# Patient Record
Sex: FEMALE | Race: BLACK OR AFRICAN AMERICAN | Employment: OTHER | URBAN - METROPOLITAN AREA
[De-identification: names, ages, dates, MRNs, and addresses within clinical notes are randomized per-mention and may not be internally consistent; named-entity substitution may affect disease eponyms.]

---

## 2018-02-02 ENCOUNTER — OFFICE VISIT (OUTPATIENT)
Dept: INTERNAL MEDICINE CLINIC | Facility: CLINIC | Age: 73
End: 2018-02-02

## 2018-02-02 VITALS
BODY MASS INDEX: 47.09 KG/M2 | WEIGHT: 293 LBS | HEIGHT: 66 IN | TEMPERATURE: 98 F | SYSTOLIC BLOOD PRESSURE: 131 MMHG | HEART RATE: 73 BPM | DIASTOLIC BLOOD PRESSURE: 80 MMHG

## 2018-02-02 DIAGNOSIS — I10 ESSENTIAL HYPERTENSION: ICD-10-CM

## 2018-02-02 DIAGNOSIS — I35.1 AORTIC EJECTION MURMUR: ICD-10-CM

## 2018-02-02 DIAGNOSIS — R42 VERTIGO: ICD-10-CM

## 2018-02-02 DIAGNOSIS — E78.2 MIXED HYPERLIPIDEMIA: ICD-10-CM

## 2018-02-02 DIAGNOSIS — R42 LIGHTHEADEDNESS: Primary | ICD-10-CM

## 2018-02-02 DIAGNOSIS — M17.0 PRIMARY OSTEOARTHRITIS OF BOTH KNEES: ICD-10-CM

## 2018-02-02 PROCEDURE — 99204 OFFICE O/P NEW MOD 45 MIN: CPT | Performed by: INTERNAL MEDICINE

## 2018-02-02 PROCEDURE — G0463 HOSPITAL OUTPT CLINIC VISIT: HCPCS | Performed by: INTERNAL MEDICINE

## 2018-02-02 RX ORDER — ATORVASTATIN CALCIUM 10 MG/1
TABLET, FILM COATED ORAL
Refills: 2 | COMMUNITY
Start: 2017-11-27 | End: 2019-05-22 | Stop reason: ALTCHOICE

## 2018-02-02 RX ORDER — MECLIZINE HYDROCHLORIDE 25 MG/1
25 TABLET ORAL 3 TIMES DAILY PRN
Qty: 21 TABLET | Refills: 0 | Status: SHIPPED | OUTPATIENT
Start: 2018-02-02 | End: 2018-02-09

## 2018-02-02 RX ORDER — PANTOPRAZOLE SODIUM 40 MG/1
TABLET, DELAYED RELEASE ORAL
Refills: 2 | COMMUNITY
Start: 2017-12-11 | End: 2020-07-20

## 2018-02-02 RX ORDER — AMLODIPINE BESYLATE 5 MG/1
TABLET ORAL
Refills: 2 | COMMUNITY
Start: 2017-11-28 | End: 2019-06-03

## 2018-02-02 RX ORDER — NAPROXEN 500 MG/1
TABLET ORAL
Refills: 0 | COMMUNITY
Start: 2017-12-14 | End: 2019-05-22 | Stop reason: ALTCHOICE

## 2018-02-02 RX ORDER — TRIAMTERENE AND HYDROCHLOROTHIAZIDE 37.5; 25 MG/1; MG/1
CAPSULE ORAL
Refills: 2 | COMMUNITY
Start: 2018-01-15 | End: 2019-06-03

## 2018-02-02 NOTE — PROGRESS NOTES
Kerri Garcia is a 71year old female. Patient presents with:  Lightheadedness: pt c/o of some dizziness      HPI:   71 yr old female here as a new patient for lightheadedness for 4 days. She feels dizzy when she wakes up in th morning.  \" she feels unusual skin lesions or rashes. RESPIRATORY: denies shortness of breath, wheezing, cough. CARDIOVASCULAR: denies chest pain on exertion, palpitations, swelling in feet. GI: denies abdominal pain and denies heartburn, nausea or vomiting.   : No pain on (CPT=93306); Future  -     US CAROTID DOPPLER BILAT - DIAG IMG (CPT=93880); Future    Vertigo  Plan as above. Aortic ejection murmur  Grade 4 systolic ejection murmur at left ICS. Suspecting aortic valvular pathology. Getting  echo.   -     CARD ECHO 2

## 2018-02-03 ENCOUNTER — HOSPITAL ENCOUNTER (OUTPATIENT)
Dept: CV DIAGNOSTICS | Facility: HOSPITAL | Age: 73
Discharge: HOME | End: 2018-02-03
Attending: INTERNAL MEDICINE
Payer: MEDICARE

## 2018-02-03 DIAGNOSIS — I35.1 AORTIC EJECTION MURMUR: ICD-10-CM

## 2018-02-03 DIAGNOSIS — R42 LIGHTHEADEDNESS: ICD-10-CM

## 2018-02-03 PROCEDURE — 93306 TTE W/DOPPLER COMPLETE: CPT | Performed by: INTERNAL MEDICINE

## 2018-02-09 ENCOUNTER — TELEPHONE (OUTPATIENT)
Dept: OPHTHALMOLOGY | Facility: CLINIC | Age: 73
End: 2018-02-09

## 2018-02-09 NOTE — TELEPHONE ENCOUNTER
Patient states she has been experiencing weakness in her left eye for about a weak. Requesting to be seen ASAP. Please call. Thank you.  NEW PATIENT, aware clinic is closed for the day

## 2018-02-12 NOTE — TELEPHONE ENCOUNTER
Patient says that her left eye has been \"bothering\" her in the morning for about 2 weeks. She says that OS is watering, irritated and blurry in the mornings. She says she wears glasses and probably needs an update.   Appointment was offered with PRICE hu

## 2018-02-15 ENCOUNTER — OFFICE VISIT (OUTPATIENT)
Dept: OPHTHALMOLOGY | Facility: CLINIC | Age: 73
End: 2018-02-15

## 2018-02-15 DIAGNOSIS — H02.883 MEIBOMIAN GLAND DYSFUNCTION (MGD) OF BOTH EYES: Primary | ICD-10-CM

## 2018-02-15 DIAGNOSIS — H02.886 MEIBOMIAN GLAND DYSFUNCTION (MGD) OF BOTH EYES: Primary | ICD-10-CM

## 2018-02-15 PROCEDURE — G0463 HOSPITAL OUTPT CLINIC VISIT: HCPCS | Performed by: OPHTHALMOLOGY

## 2018-02-15 PROCEDURE — 99203 OFFICE O/P NEW LOW 30 MIN: CPT | Performed by: OPHTHALMOLOGY

## 2018-02-15 NOTE — PATIENT INSTRUCTIONS
Meibomian gland dysfunction (MGD) of both eyes  Patient was instructed to use warm compresses to the eyelids twice a day everyday.     Instructions for warm compress use:   Patient should place wash compresses on both eyelids for 5 minutes every morning and

## 2018-02-15 NOTE — PROGRESS NOTES
Lillian Jeffers is a 71year old female. HPI:     HPI     NP. Pt complains of blurred vision, tearing, itching and irritation with crusting OS x 2 weeks when she wakes up in the morning. Pt states that her last eye exam was over 3 years ago.  Pt is n Slit Lamp Exam       Right Left    Lids/Lashes Dermatochalasis, Meibomian gland dysfunction Dermatochalasis, Meibomian gland dysfunction, frank upper lid- no FB    Conjunctiva/Sclera Trace Injection Trace Injection    Cornea Clear, no fluorescein stain

## 2019-05-22 ENCOUNTER — OFFICE VISIT (OUTPATIENT)
Dept: INTERNAL MEDICINE CLINIC | Facility: CLINIC | Age: 74
End: 2019-05-22
Payer: MEDICARE

## 2019-05-22 VITALS — HEIGHT: 66 IN | BODY MASS INDEX: 43.36 KG/M2 | WEIGHT: 269.81 LBS

## 2019-05-22 DIAGNOSIS — F32.0 CURRENT MILD EPISODE OF MAJOR DEPRESSIVE DISORDER, UNSPECIFIED WHETHER RECURRENT (HCC): ICD-10-CM

## 2019-05-22 DIAGNOSIS — M17.0 PRIMARY OSTEOARTHRITIS OF BOTH KNEES: ICD-10-CM

## 2019-05-22 DIAGNOSIS — E78.2 MIXED HYPERLIPIDEMIA: Primary | ICD-10-CM

## 2019-05-22 DIAGNOSIS — I10 ESSENTIAL HYPERTENSION: ICD-10-CM

## 2019-05-22 DIAGNOSIS — R42 DIZZINESS: ICD-10-CM

## 2019-05-22 DIAGNOSIS — G56.03 BILATERAL CARPAL TUNNEL SYNDROME: ICD-10-CM

## 2019-05-22 PROBLEM — F32.9 MAJOR DEPRESSIVE DISORDER: Status: ACTIVE | Noted: 2019-05-22

## 2019-05-22 PROCEDURE — 99214 OFFICE O/P EST MOD 30 MIN: CPT | Performed by: INTERNAL MEDICINE

## 2019-05-22 PROCEDURE — G0463 HOSPITAL OUTPT CLINIC VISIT: HCPCS | Performed by: INTERNAL MEDICINE

## 2019-05-22 RX ORDER — ALLOPURINOL 100 MG/1
100 TABLET ORAL
COMMUNITY
End: 2019-06-03

## 2019-05-22 RX ORDER — SERTRALINE HYDROCHLORIDE 25 MG/1
TABLET, FILM COATED ORAL
Refills: 3 | COMMUNITY
Start: 2019-04-24 | End: 2019-06-10 | Stop reason: ALTCHOICE

## 2019-05-22 RX ORDER — ATORVASTATIN CALCIUM 20 MG/1
TABLET, FILM COATED ORAL
Refills: 3 | COMMUNITY
Start: 2019-03-11 | End: 2019-06-03

## 2019-05-22 NOTE — ASSESSMENT & PLAN NOTE
CPM  On amlodopine,, triamterene-hydrochlorothiazide 37.5/25 mg. Check labs. Her kidney function was slightly low in the labs done in [facility], could be from dehydration. Her kidney function was normal in December 2018  Repeat labs.

## 2019-05-22 NOTE — ASSESSMENT & PLAN NOTE
Patient with history of depression and anxiety. She has sertraline 25 mg, continue. Has Ativan 0.5 mg prescribed by her previous PCP for acute anxiety which she has at home. Can take it as needed for acute episodes.   She does not like to take it due to

## 2019-05-22 NOTE — PROGRESS NOTES
Gonzalez Krishnan is a 69year old female. Patient presents with:  ER F/U: patient went to [facility] ER on 4/21/19  Tingling      HPI:     HPI    Patient is here for ER follow on 4/21/19. She states she felt anxious and nervous on the day of ER visit.   [employer]   Constitutional: Negative for chills, fever, malaise/fatigue and weight loss. HENT: Negative for congestion, sinus pain, sore throat and tinnitus. Eyes: Negative for blurred vision and double vision.    Respiratory: Negative for cough, sputum labs.  Her kidney function was slightly low in the labs done in [facility], could be from dehydration. Her kidney function was normal in December 2018  Repeat labs.          Relevant Orders    LIPID PANEL    COMP METABOLIC PANEL (14)    CBC WITH DIFFERENTIA agrees to the plan.               Mo Jason MD  5/22/2019  3:01 PM

## 2019-05-22 NOTE — ASSESSMENT & PLAN NOTE
Symptoms are mild. No hyperthenar atrophy or loss of   strength. Advised to use wrist splint bilaterally nightly.

## 2019-06-03 ENCOUNTER — OFFICE VISIT (OUTPATIENT)
Dept: INTERNAL MEDICINE CLINIC | Facility: CLINIC | Age: 74
End: 2019-06-03
Payer: MEDICARE

## 2019-06-03 ENCOUNTER — LAB ENCOUNTER (OUTPATIENT)
Dept: LAB | Age: 74
End: 2019-06-03
Attending: INTERNAL MEDICINE
Payer: MEDICARE

## 2019-06-03 VITALS
HEIGHT: 66 IN | BODY MASS INDEX: 42.4 KG/M2 | DIASTOLIC BLOOD PRESSURE: 72 MMHG | SYSTOLIC BLOOD PRESSURE: 110 MMHG | WEIGHT: 263.81 LBS | HEART RATE: 80 BPM

## 2019-06-03 DIAGNOSIS — M10.9 GOUT, UNSPECIFIED CAUSE, UNSPECIFIED CHRONICITY, UNSPECIFIED SITE: ICD-10-CM

## 2019-06-03 DIAGNOSIS — M47.819 ARTHRITIS OF LOW BACK: ICD-10-CM

## 2019-06-03 DIAGNOSIS — F32.0 CURRENT MILD EPISODE OF MAJOR DEPRESSIVE DISORDER, UNSPECIFIED WHETHER RECURRENT (HCC): ICD-10-CM

## 2019-06-03 DIAGNOSIS — E78.2 MIXED HYPERLIPIDEMIA: ICD-10-CM

## 2019-06-03 DIAGNOSIS — F41.9 ANXIETY: ICD-10-CM

## 2019-06-03 DIAGNOSIS — I10 ESSENTIAL HYPERTENSION: ICD-10-CM

## 2019-06-03 DIAGNOSIS — I10 ESSENTIAL HYPERTENSION: Primary | ICD-10-CM

## 2019-06-03 DIAGNOSIS — E79.0 ELEVATED BLOOD URIC ACID LEVEL: ICD-10-CM

## 2019-06-03 PROCEDURE — 99214 OFFICE O/P EST MOD 30 MIN: CPT | Performed by: INTERNAL MEDICINE

## 2019-06-03 PROCEDURE — 85025 COMPLETE CBC W/AUTO DIFF WBC: CPT

## 2019-06-03 PROCEDURE — 36415 COLL VENOUS BLD VENIPUNCTURE: CPT

## 2019-06-03 PROCEDURE — 80053 COMPREHEN METABOLIC PANEL: CPT

## 2019-06-03 PROCEDURE — G0463 HOSPITAL OUTPT CLINIC VISIT: HCPCS | Performed by: INTERNAL MEDICINE

## 2019-06-03 PROCEDURE — 84443 ASSAY THYROID STIM HORMONE: CPT

## 2019-06-03 PROCEDURE — 80061 LIPID PANEL: CPT

## 2019-06-03 RX ORDER — ALLOPURINOL 100 MG/1
100 TABLET ORAL DAILY
Qty: 90 TABLET | Refills: 0 | Status: SHIPPED | OUTPATIENT
Start: 2019-06-03 | End: 2019-06-10 | Stop reason: ALTCHOICE

## 2019-06-03 RX ORDER — TRIAMTERENE AND HYDROCHLOROTHIAZIDE 37.5; 25 MG/1; MG/1
CAPSULE ORAL
Qty: 90 CAPSULE | Refills: 3 | Status: SHIPPED | OUTPATIENT
Start: 2019-06-03 | End: 2020-01-18

## 2019-06-03 RX ORDER — ATORVASTATIN CALCIUM 20 MG/1
TABLET, FILM COATED ORAL
Qty: 90 TABLET | Refills: 1 | Status: SHIPPED | OUTPATIENT
Start: 2019-06-03 | End: 2019-06-10

## 2019-06-03 NOTE — PROGRESS NOTES
Cecilia Cabrales is a 69year old female.   Patient presents with:  Establish Care      HPI:   Pt comes as a new pt   C/c anxiety   C/o thinks that the sertraline is making her feel fittery in the am --was started in April but didn't start it intil 2 week , + anxiety, + depression-- no SI HI     EXAM:   /72   Pulse 80   Ht 5' 6\" (1.676 m)   Wt 263 lb 12.8 oz (119.7 kg)   BMI 42.58 kg/m²   GENERAL: well developed, well nourished,in no apparent distress, walks with cane   SKIN: no rashes,no suspicious

## 2019-06-10 ENCOUNTER — OFFICE VISIT (OUTPATIENT)
Dept: INTERNAL MEDICINE CLINIC | Facility: CLINIC | Age: 74
End: 2019-06-10
Payer: MEDICARE

## 2019-06-10 VITALS
WEIGHT: 259 LBS | BODY MASS INDEX: 41.62 KG/M2 | TEMPERATURE: 99 F | HEART RATE: 70 BPM | HEIGHT: 66 IN | SYSTOLIC BLOOD PRESSURE: 136 MMHG | DIASTOLIC BLOOD PRESSURE: 84 MMHG

## 2019-06-10 DIAGNOSIS — F41.9 ANXIETY: ICD-10-CM

## 2019-06-10 DIAGNOSIS — M17.0 PRIMARY OSTEOARTHRITIS OF BOTH KNEES: ICD-10-CM

## 2019-06-10 DIAGNOSIS — I10 ESSENTIAL HYPERTENSION: ICD-10-CM

## 2019-06-10 DIAGNOSIS — G89.29 CHRONIC MIDLINE LOW BACK PAIN WITHOUT SCIATICA: ICD-10-CM

## 2019-06-10 DIAGNOSIS — R73.01 ELEVATED FASTING BLOOD SUGAR: ICD-10-CM

## 2019-06-10 DIAGNOSIS — N18.30 CKD (CHRONIC KIDNEY DISEASE) STAGE 3, GFR 30-59 ML/MIN (HCC): ICD-10-CM

## 2019-06-10 DIAGNOSIS — M10.9 GOUT, UNSPECIFIED CAUSE, UNSPECIFIED CHRONICITY, UNSPECIFIED SITE: ICD-10-CM

## 2019-06-10 DIAGNOSIS — M54.50 CHRONIC MIDLINE LOW BACK PAIN WITHOUT SCIATICA: ICD-10-CM

## 2019-06-10 DIAGNOSIS — E78.2 MIXED HYPERLIPIDEMIA: Primary | ICD-10-CM

## 2019-06-10 PROCEDURE — G0463 HOSPITAL OUTPT CLINIC VISIT: HCPCS | Performed by: INTERNAL MEDICINE

## 2019-06-10 PROCEDURE — 99214 OFFICE O/P EST MOD 30 MIN: CPT | Performed by: INTERNAL MEDICINE

## 2019-06-10 RX ORDER — ATORVASTATIN CALCIUM 40 MG/1
TABLET, FILM COATED ORAL
Qty: 90 TABLET | Refills: 2 | Status: SHIPPED | OUTPATIENT
Start: 2019-06-10 | End: 2020-02-18

## 2019-06-10 NOTE — PATIENT INSTRUCTIONS
Arthritis: Exercise     Look for exercise classes for arthritis in your community. Exercise is important to your overall health. It is especially important in people with arthritis.  Regular exercise can:  · Keep your heart and blood vessels healthy · Pay attention to your body. Don't exercise a painful or swollen joint; switch to another activity. Follow the 2-hour pain rule: You did too much if your joint or muscle pain lasts 2 hours or more after exercising, or is worse the next day.  This doesn't m 1. Neck turns. Sit in a straight-backed chair. Look straight ahead. Slowly turn your head to the right, then return it to center. Repeat. Do the same thing, turning your head to the left. Repeat. 2. Shoulder raise. Lie on your back or sit in a chair.  Susan © 0321-2395 [employer] [address]. [address] [facility], [address] [place]. All rights reserved. This information is not intended as a substitute for professional medical care. Always follow your healthcare professional's instructions.         Back Ca · You may use acetaminophen or ibuprofen to control pain, unless your healthcare provider prescribed other pain medicine.  If you have chronic conditions like diabetes, liver or kidney disease, stomach ulcers, or gastrointestinal bleeding, or are taking blo When standing for long periods, shift most of your weight to one leg at a time. Alternate legs every few minutes.   Sleeping  The best way to sleep is on your side with your knees bent.  Put a low pillow under your head to support your neck in a neutral spi

## 2019-06-10 NOTE — PROGRESS NOTES
Sushila Buckley is a 69year old female. Patient presents with:   Follow - Up: 1 week f/u  Anxiety  Depression      HPI:   She comes for follow-up  C/c anxiety and depression   C/o anxiety and depression -- weaned herself off the sertraline and her last surgical history        Social History:  Social History    Tobacco Use      Smoking status: Never Smoker      Smokeless tobacco: Never Used    Alcohol use: No    Drug use: Never       REVIEW OF SYSTEMS:   GENERAL HEALTH: No fevers, chills, sweats, fatigue take that and can check  uric acid level at her next blood work    Essential hypertension  Advised pt to follow a low salt , low sodium (including fast foods and processed foods), can look up DASH diet, exercise 30 min a day , monitor bp     CKD (chronic k

## 2019-06-11 ENCOUNTER — PATIENT MESSAGE (OUTPATIENT)
Dept: INTERNAL MEDICINE CLINIC | Facility: CLINIC | Age: 74
End: 2019-06-11

## 2019-06-11 NOTE — TELEPHONE ENCOUNTER
From: Briseida Yip  To: Judy Piper MD  Sent: 6/11/2019 11:15 AM CDT  Subject: Other    Please mery zuniga need to asked a question about my blood pressure pill call me at [phone] thank you.

## 2019-06-11 NOTE — TELEPHONE ENCOUNTER
Patient forgot to take her medication for her b/p yesterday and took it later in the afternoon. She took it today a bit later than usual today as well. She wanted to know when to take it next.  Advised patient to continue her b/p medication per her regular

## 2019-06-13 ENCOUNTER — PATIENT MESSAGE (OUTPATIENT)
Dept: INTERNAL MEDICINE CLINIC | Facility: CLINIC | Age: 74
End: 2019-06-13

## 2019-06-13 NOTE — TELEPHONE ENCOUNTER
From: Rekha Arreaga  To: Guillermo Church MD  Sent: 6/13/2019 1:08 PM CDT  Subject: Other    Dr Murphy Weeks this mery zuniga I'm taking sertraline as you told me,but I'm having bad headache and dizziness, could this causes my blood pressure to be hig

## 2019-06-13 NOTE — TELEPHONE ENCOUNTER
Pt states weaning off of Zoloft. Pt took one pill today and after an hour had a headache and felt dizzy. I told patient this was not likely due to the Zoloft but could be due to high blood pressure.  Pt states she took her bp meds today, but did not take he

## 2019-06-14 ENCOUNTER — APPOINTMENT (OUTPATIENT)
Dept: LAB | Facility: HOSPITAL | Age: 74
End: 2019-06-14
Attending: INTERNAL MEDICINE
Payer: MEDICARE

## 2019-06-14 ENCOUNTER — HOSPITAL ENCOUNTER (OUTPATIENT)
Dept: GENERAL RADIOLOGY | Facility: HOSPITAL | Age: 74
Discharge: HOME | End: 2019-06-14
Attending: INTERNAL MEDICINE
Payer: MEDICARE

## 2019-06-14 DIAGNOSIS — G89.29 CHRONIC MIDLINE LOW BACK PAIN WITHOUT SCIATICA: ICD-10-CM

## 2019-06-14 DIAGNOSIS — M54.50 CHRONIC MIDLINE LOW BACK PAIN WITHOUT SCIATICA: ICD-10-CM

## 2019-06-14 DIAGNOSIS — R73.01 ELEVATED FASTING BLOOD SUGAR: ICD-10-CM

## 2019-06-14 DIAGNOSIS — M10.9 GOUT, UNSPECIFIED CAUSE, UNSPECIFIED CHRONICITY, UNSPECIFIED SITE: ICD-10-CM

## 2019-06-14 PROCEDURE — 36415 COLL VENOUS BLD VENIPUNCTURE: CPT

## 2019-06-14 PROCEDURE — 83036 HEMOGLOBIN GLYCOSYLATED A1C: CPT

## 2019-06-14 PROCEDURE — 72110 X-RAY EXAM L-2 SPINE 4/>VWS: CPT | Performed by: INTERNAL MEDICINE

## 2019-06-14 PROCEDURE — 84550 ASSAY OF BLOOD/URIC ACID: CPT

## 2019-06-17 ENCOUNTER — TELEPHONE (OUTPATIENT)
Dept: INTERNAL MEDICINE CLINIC | Facility: CLINIC | Age: 74
End: 2019-06-17

## 2019-06-17 DIAGNOSIS — M47.816 SPONDYLOSIS OF LUMBAR REGION WITHOUT MYELOPATHY OR RADICULOPATHY: Primary | ICD-10-CM

## 2019-06-17 NOTE — TELEPHONE ENCOUNTER
Results have been sent to my chart  A1c is good at 6.4 and uric acid is within normal limits  X-ray--arthritis in lumbar spine (lower back ).   You would benefit from from physical therapy and I have ordered this in the chart–please help her  Call to make t

## 2019-06-18 ENCOUNTER — PATIENT MESSAGE (OUTPATIENT)
Dept: INTERNAL MEDICINE CLINIC | Facility: CLINIC | Age: 74
End: 2019-06-18

## 2019-06-19 NOTE — PROGRESS NOTES
Agree with advice–would advise her to continue with the every other day for at least a month and then try every 2 days for a month and then every 3 days for a month--- she needs a slower taper

## 2019-06-19 NOTE — TELEPHONE ENCOUNTER
From: Ritesh Calhoun  To: Johanne Sandifer, MD  Sent: 6/18/2019 6:46 PM CDT  Subject: Other    This mery zuniga. Will you please give me a call at your earliest time. Thank You.

## 2019-06-19 NOTE — TELEPHONE ENCOUNTER
Called patient. She stated that Dr. Camacho Osei instructed her to take Sertraline HCl 25 MG every other day and after a week to take one tab every 2 days. Patient tried to do that but stated she felt nauseated and nervous.  This Rn advised her to take one tab e

## 2019-06-25 ENCOUNTER — OFFICE VISIT (OUTPATIENT)
Dept: NEPHROLOGY | Facility: CLINIC | Age: 74
End: 2019-06-25
Payer: MEDICARE

## 2019-06-25 VITALS
WEIGHT: 262.63 LBS | DIASTOLIC BLOOD PRESSURE: 80 MMHG | SYSTOLIC BLOOD PRESSURE: 130 MMHG | HEIGHT: 66 IN | HEART RATE: 81 BPM | TEMPERATURE: 99 F | BODY MASS INDEX: 42.21 KG/M2

## 2019-06-25 DIAGNOSIS — N17.9 AKI (ACUTE KIDNEY INJURY) (HCC): Primary | ICD-10-CM

## 2019-06-25 DIAGNOSIS — N18.30 CKD (CHRONIC KIDNEY DISEASE), STAGE III (HCC): ICD-10-CM

## 2019-06-25 PROCEDURE — G0463 HOSPITAL OUTPT CLINIC VISIT: HCPCS | Performed by: INTERNAL MEDICINE

## 2019-06-25 PROCEDURE — 99204 OFFICE O/P NEW MOD 45 MIN: CPT | Performed by: INTERNAL MEDICINE

## 2019-06-25 RX ORDER — SERTRALINE HYDROCHLORIDE 25 MG/1
12.5 TABLET, FILM COATED ORAL EVERY OTHER DAY
COMMUNITY
End: 2020-07-20

## 2019-06-25 NOTE — PATIENT INSTRUCTIONS
Lab test as ordered next week   follow up in 2-3 weeks   Ultrasound of kidney - call to make an appointment

## 2019-06-25 NOTE — PROGRESS NOTES
Consult Requested By: Dr. Lamin Valenzuela    Reason for Consult: CARLOS on CKD     HPI:     Patient is a 69 yrs old female with pmh of HTN x <5 yrs, CKD stage III, HL, osteoarthritis, obesity who presented for work up and evaluation of kidney disease     Lab Negative for chest pain, sobs  Respiratory:  Negative for cough, dyspnea and wheezing  Gastrointestinal:  Negative for abdominal pain, constipation  Genitourinary:  Negative for dysuria and hematuria  Endocrine:  Negative for abnormal sleep patterns, incre Microalb/Creat Ratio, Random Urine      Protein,Total,Urine, Random [E]      Meds This Visit:  Requested Prescriptions      No prescriptions requested or ordered in this encounter       Imaging & Referrals:  US KIDNEY/BLADDER (YFT=[id number])     6/25/2019  Ros

## 2019-07-05 ENCOUNTER — APPOINTMENT (OUTPATIENT)
Dept: LAB | Age: 74
End: 2019-07-05
Attending: INTERNAL MEDICINE
Payer: MEDICARE

## 2019-07-05 DIAGNOSIS — N17.9 AKI (ACUTE KIDNEY INJURY) (HCC): ICD-10-CM

## 2019-07-05 DIAGNOSIS — N18.30 CKD (CHRONIC KIDNEY DISEASE), STAGE III (HCC): ICD-10-CM

## 2019-07-05 LAB
ANION GAP SERPL CALC-SCNC: 5 MMOL/L (ref 0–18)
BILIRUB UR QL: NEGATIVE
BUN BLD-MCNC: 18 MG/DL (ref 7–18)
BUN/CREAT SERPL: 14.4 (ref 10–20)
CALCIUM BLD-MCNC: 9.1 MG/DL (ref 8.5–10.1)
CHLORIDE SERPL-SCNC: 106 MMOL/L (ref 98–112)
CLARITY UR: CLEAR
CO2 SERPL-SCNC: 30 MMOL/L (ref 21–32)
COLOR UR: YELLOW
CREAT BLD-MCNC: 1.25 MG/DL (ref 0.55–1.02)
CREAT UR-SCNC: 235 MG/DL
GLUCOSE BLD-MCNC: 107 MG/DL (ref 70–99)
GLUCOSE UR-MCNC: NEGATIVE MG/DL
HGB UR QL STRIP.AUTO: NEGATIVE
KETONES UR-MCNC: NEGATIVE MG/DL
MICROALBUMIN UR-MCNC: 0.55 MG/DL
MICROALBUMIN/CREAT 24H UR-RTO: 2.3 UG/MG (ref ?–30)
NITRITE UR QL STRIP.AUTO: NEGATIVE
OSMOLALITY SERPL CALC.SUM OF ELEC: 294 MOSM/KG (ref 275–295)
PATIENT FASTING: YES
PH UR: 6 [PH] (ref 5–8)
POTASSIUM SERPL-SCNC: 4.2 MMOL/L (ref 3.5–5.1)
PROT UR-MCNC: 17.4 MG/DL
PROT UR-MCNC: NEGATIVE MG/DL
RBC #/AREA URNS AUTO: <1 /HPF
SODIUM SERPL-SCNC: 141 MMOL/L (ref 136–145)
SP GR UR STRIP: 1.02 (ref 1–1.03)
UROBILINOGEN UR STRIP-ACNC: <2
VIT C UR-MCNC: 40 MG/DL
WBC #/AREA URNS AUTO: 2 /HPF

## 2019-07-05 PROCEDURE — 36415 COLL VENOUS BLD VENIPUNCTURE: CPT

## 2019-07-05 PROCEDURE — 84156 ASSAY OF PROTEIN URINE: CPT

## 2019-07-05 PROCEDURE — 82043 UR ALBUMIN QUANTITATIVE: CPT

## 2019-07-05 PROCEDURE — 82570 ASSAY OF URINE CREATININE: CPT

## 2019-07-05 PROCEDURE — 80048 BASIC METABOLIC PNL TOTAL CA: CPT

## 2019-07-05 PROCEDURE — 81001 URINALYSIS AUTO W/SCOPE: CPT

## 2019-07-07 ENCOUNTER — PATIENT MESSAGE (OUTPATIENT)
Dept: NEPHROLOGY | Facility: CLINIC | Age: 74
End: 2019-07-07

## 2019-07-08 ENCOUNTER — HOSPITAL ENCOUNTER (OUTPATIENT)
Dept: ULTRASOUND IMAGING | Facility: HOSPITAL | Age: 74
Discharge: HOME | End: 2019-07-08
Attending: INTERNAL MEDICINE
Payer: MEDICARE

## 2019-07-08 DIAGNOSIS — N17.9 AKI (ACUTE KIDNEY INJURY) (HCC): ICD-10-CM

## 2019-07-08 DIAGNOSIS — N18.30 CKD (CHRONIC KIDNEY DISEASE), STAGE III (HCC): ICD-10-CM

## 2019-07-08 PROCEDURE — 76770 US EXAM ABDO BACK WALL COMP: CPT | Performed by: INTERNAL MEDICINE

## 2019-07-08 NOTE — TELEPHONE ENCOUNTER
Urine test (urine protein and Creatinine) done to quantify urine protein.  Urine is negative for proteinuria    Improve kidney function

## 2019-07-08 NOTE — TELEPHONE ENCOUNTER
Contacted pt. Requesting lab results and interpretation of results, specifically for random urine creatinine level.

## 2019-07-08 NOTE — TELEPHONE ENCOUNTER
From: March Jacob  To: Travon Maya MD  Sent: 7/7/2019 1:21 PM CDT  Subject: Test Results Question    This is mery Matamoros I have a question about my creatinine ur random test please call [phone]. Thank You.

## 2019-07-10 ENCOUNTER — PATIENT MESSAGE (OUTPATIENT)
Dept: NEPHROLOGY | Facility: CLINIC | Age: 74
End: 2019-07-10

## 2019-07-11 NOTE — TELEPHONE ENCOUNTER
Contacted pt and answered her questions about the simple cyst found on her left kidney. Pt reassured that this is a simple cyst that doesn't require follow up at this time time per [facility]'s notation on US report.

## 2019-07-11 NOTE — TELEPHONE ENCOUNTER
From: Lainey Marquez  To: Ej Nicholson MD  Sent: 7/10/2019 9:02 PM CDT  Subject: Test Results Question    This is Lainey Marquez . I have a question about my kidney test results. please call me back thank you.

## 2019-07-13 ENCOUNTER — PATIENT MESSAGE (OUTPATIENT)
Dept: INTERNAL MEDICINE CLINIC | Facility: CLINIC | Age: 74
End: 2019-07-13

## 2019-07-13 ENCOUNTER — NURSE TRIAGE (OUTPATIENT)
Dept: OTHER | Age: 74
End: 2019-07-13

## 2019-07-13 ENCOUNTER — HOSPITAL ENCOUNTER (EMERGENCY)
Facility: HOSPITAL | Age: 74
Discharge: HOME | End: 2019-07-13
Attending: EMERGENCY MEDICINE
Payer: MEDICARE

## 2019-07-13 VITALS
TEMPERATURE: 100 F | WEIGHT: 263 LBS | DIASTOLIC BLOOD PRESSURE: 60 MMHG | SYSTOLIC BLOOD PRESSURE: 134 MMHG | OXYGEN SATURATION: 97 % | RESPIRATION RATE: 18 BRPM | BODY MASS INDEX: 42 KG/M2 | HEART RATE: 71 BPM

## 2019-07-13 DIAGNOSIS — M43.6 TORTICOLLIS: Primary | ICD-10-CM

## 2019-07-13 PROCEDURE — 99283 EMERGENCY DEPT VISIT LOW MDM: CPT

## 2019-07-13 RX ORDER — DIAZEPAM 2 MG/1
2 TABLET ORAL ONCE
Status: COMPLETED | OUTPATIENT
Start: 2019-07-13 | End: 2019-07-13

## 2019-07-13 RX ORDER — IBUPROFEN 600 MG/1
600 TABLET ORAL ONCE
Status: COMPLETED | OUTPATIENT
Start: 2019-07-13 | End: 2019-07-13

## 2019-07-13 RX ORDER — DIAZEPAM 2 MG/1
2 TABLET ORAL NIGHTLY PRN
Qty: 10 TABLET | Refills: 0 | Status: SHIPPED | OUTPATIENT
Start: 2019-07-13 | End: 2019-07-20

## 2019-07-13 NOTE — TELEPHONE ENCOUNTER
Please reply to pool: EM RN TRIAGE  Action Requested: Summary for Provider     []  Critical Lab, Recommendations Needed  [x] Need Additional Advice  []   FYI    [x]   Need Orders  [] Need Medications Sent to Pharmacy  []  Other     SUMMARY: Home care adv

## 2019-07-13 NOTE — TELEPHONE ENCOUNTER
See Acute TE 7/13/19, will close this encounter. From: Jer Noland  To: Marc Davies MD  Sent: 7/13/2019  8:19 AM CDT  Subject: Prescription Question    Can you give a medical for muscle sapase.  I have had pain in the left two days  This Ch

## 2019-07-13 NOTE — TELEPHONE ENCOUNTER
Spoke with the patient and made her aware of Dr. Katia Hemphill message.  Patient voiced understanding and scheduled an appointment for Monday 7/15/19 to see Dr. Carol Oneil patient to go to the urgent care clinic or walk-in clinic if her symptoms get worse

## 2019-07-13 NOTE — TELEPHONE ENCOUNTER
----- Message from Gonzalez Krishnan sent at 7/13/2019  8:19 AM CDT -----  Regarding: Prescription Question  Contact: [phone]  Can you give a medical for muscle sapase. I have had pain in the left two days  This Gonzalez Krishnan.  [phone]  Than

## 2019-07-13 NOTE — TELEPHONE ENCOUNTER
With advice given especially due to patient's age and side effects of muscle relaxants--in chart noted that she is never been on this

## 2019-07-14 NOTE — ED PROVIDER NOTES
Patient Seen in: [facility] AND [facility] Emergency Department    History   Patient presents with:  Neck Pain (musculoskeletal, neurologic)    Stated Complaint: neck pain for two days, denies any traumatic injury    HPI    Patient is a 64-year-old female who pr motor strength in all extremities, no focal deficits, equal hand grasp b/l  SKIN: warm, dry, no rashes        ED Course   Labs Reviewed - No data to display      MDM   Pt given ibuprofen and valium. Rx for home.  Advised close f/u and to return for other sy

## 2019-07-22 ENCOUNTER — APPOINTMENT (OUTPATIENT)
Dept: PHYSICAL THERAPY | Facility: HOSPITAL | Age: 74
End: 2019-07-22
Attending: INTERNAL MEDICINE
Payer: MEDICARE

## 2019-07-22 ENCOUNTER — OFFICE VISIT (OUTPATIENT)
Dept: NEPHROLOGY | Facility: CLINIC | Age: 74
End: 2019-07-22
Payer: MEDICARE

## 2019-07-22 VITALS
SYSTOLIC BLOOD PRESSURE: 123 MMHG | HEIGHT: 65 IN | WEIGHT: 261.81 LBS | TEMPERATURE: 99 F | BODY MASS INDEX: 43.62 KG/M2 | DIASTOLIC BLOOD PRESSURE: 78 MMHG | HEART RATE: 74 BPM

## 2019-07-22 DIAGNOSIS — N18.30 CKD (CHRONIC KIDNEY DISEASE), STAGE III (HCC): Primary | ICD-10-CM

## 2019-07-22 DIAGNOSIS — N17.9 AKI (ACUTE KIDNEY INJURY) (HCC): ICD-10-CM

## 2019-07-22 PROCEDURE — 99213 OFFICE O/P EST LOW 20 MIN: CPT | Performed by: INTERNAL MEDICINE

## 2019-07-22 PROCEDURE — G0463 HOSPITAL OUTPT CLINIC VISIT: HCPCS | Performed by: INTERNAL MEDICINE

## 2019-07-22 NOTE — PROGRESS NOTES
Progress Note:      Patient is a 69 yrs old female with pmh of HTN x <5 yrs, CKD stage III, HL, osteoarthritis, obesity who presented for follow up     Lab test BUN/Cr 18/1.42 mg/dl with an eGFR 42 ml/min. Serum albumin and calcium wnl.  No prior lab avai Negative for cough, dyspnea and wheezing  Gastrointestinal:  Negative for abdominal pain, constipation  Genitourinary:  Negative for dysuria and hematuria  Endocrine:  Negative for abnormal sleep patterns, increased activity  Hema/Lymph:  Negative for easy Visit:  Requested Prescriptions      No prescriptions requested or ordered in this encounter       Imaging & Referrals:  None     7/22/2019  Michael Saini MD

## 2019-07-30 ENCOUNTER — OFFICE VISIT (OUTPATIENT)
Dept: PHYSICAL THERAPY | Facility: HOSPITAL | Age: 74
End: 2019-07-30
Attending: INTERNAL MEDICINE
Payer: MEDICARE

## 2019-07-30 DIAGNOSIS — M47.816 SPONDYLOSIS OF LUMBAR REGION WITHOUT MYELOPATHY OR RADICULOPATHY: ICD-10-CM

## 2019-07-30 PROCEDURE — 97530 THERAPEUTIC ACTIVITIES: CPT

## 2019-07-30 PROCEDURE — 97163 PT EVAL HIGH COMPLEX 45 MIN: CPT

## 2019-07-30 NOTE — PROGRESS NOTES
LUMBAR SPINE EVALUATION:   Referring Physician: Dr. 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 Jamilah De Jesus would benefit from skilled Physical Therapy to address the above impairments to allow for improved functional mobility.      Precautions: None     OBJECTIVE:   Observation/Posture: B knee valgus R>L, rounded shoulders, f will demo BLE strength at least 4-/5 to be able to walk for 20 min with SC. Frequency / Duration: Patient will be seen for 2x/week or a total of 10 visits over a 90 day period. Treatment will include: Manual Therapy; Therapeutic Exercises;  Neuromuscula

## 2019-07-31 ENCOUNTER — TELEPHONE (OUTPATIENT)
Dept: PHYSICAL THERAPY | Facility: HOSPITAL | Age: 74
End: 2019-07-31

## 2019-08-01 ENCOUNTER — APPOINTMENT (OUTPATIENT)
Dept: PHYSICAL THERAPY | Facility: HOSPITAL | Age: 74
End: 2019-08-01
Attending: INTERNAL MEDICINE
Payer: MEDICARE

## 2019-08-12 ENCOUNTER — TELEPHONE (OUTPATIENT)
Dept: PHYSICAL THERAPY | Facility: HOSPITAL | Age: 74
End: 2019-08-12

## 2019-08-12 ENCOUNTER — APPOINTMENT (OUTPATIENT)
Dept: PHYSICAL THERAPY | Facility: HOSPITAL | Age: 74
End: 2019-08-12
Attending: INTERNAL MEDICINE
Payer: MEDICARE

## 2019-08-19 ENCOUNTER — OFFICE VISIT (OUTPATIENT)
Dept: INTERNAL MEDICINE CLINIC | Facility: CLINIC | Age: 74
End: 2019-08-19
Payer: MEDICARE

## 2019-08-19 ENCOUNTER — APPOINTMENT (OUTPATIENT)
Dept: LAB | Age: 74
End: 2019-08-19
Attending: INTERNAL MEDICINE
Payer: MEDICARE

## 2019-08-19 VITALS
HEART RATE: 61 BPM | SYSTOLIC BLOOD PRESSURE: 116 MMHG | TEMPERATURE: 99 F | WEIGHT: 262.81 LBS | HEIGHT: 65 IN | DIASTOLIC BLOOD PRESSURE: 65 MMHG | BODY MASS INDEX: 43.79 KG/M2

## 2019-08-19 DIAGNOSIS — I10 ESSENTIAL HYPERTENSION: Primary | ICD-10-CM

## 2019-08-19 DIAGNOSIS — E78.2 MIXED HYPERLIPIDEMIA: ICD-10-CM

## 2019-08-19 DIAGNOSIS — N18.30 CKD (CHRONIC KIDNEY DISEASE) STAGE 3, GFR 30-59 ML/MIN (HCC): ICD-10-CM

## 2019-08-19 DIAGNOSIS — F41.9 ANXIETY: ICD-10-CM

## 2019-08-19 DIAGNOSIS — F32.0 CURRENT MILD EPISODE OF MAJOR DEPRESSIVE DISORDER WITHOUT PRIOR EPISODE (HCC): ICD-10-CM

## 2019-08-19 DIAGNOSIS — R73.03 BORDERLINE DIABETES: ICD-10-CM

## 2019-08-19 DIAGNOSIS — K21.9 GASTROESOPHAGEAL REFLUX DISEASE WITHOUT ESOPHAGITIS: ICD-10-CM

## 2019-08-19 DIAGNOSIS — E79.0 ELEVATED BLOOD URIC ACID LEVEL: ICD-10-CM

## 2019-08-19 LAB
CHOLEST SMN-MCNC: 186 MG/DL (ref ?–200)
HDLC SERPL-MCNC: 45 MG/DL (ref 40–59)
LDLC SERPL CALC-MCNC: 131 MG/DL (ref ?–100)
NONHDLC SERPL-MCNC: 141 MG/DL (ref ?–130)
PATIENT FASTING: YES
TRIGL SERPL-MCNC: 48 MG/DL (ref 30–149)
URATE SERPL-MCNC: 7.6 MG/DL (ref 2.6–6)
VLDLC SERPL CALC-MCNC: 10 MG/DL (ref 0–30)

## 2019-08-19 PROCEDURE — 80061 LIPID PANEL: CPT

## 2019-08-19 PROCEDURE — 84550 ASSAY OF BLOOD/URIC ACID: CPT

## 2019-08-19 PROCEDURE — G0463 HOSPITAL OUTPT CLINIC VISIT: HCPCS | Performed by: INTERNAL MEDICINE

## 2019-08-19 PROCEDURE — 99214 OFFICE O/P EST MOD 30 MIN: CPT | Performed by: INTERNAL MEDICINE

## 2019-08-19 PROCEDURE — 36415 COLL VENOUS BLD VENIPUNCTURE: CPT

## 2019-08-19 NOTE — PROGRESS NOTES
Lillian Jeffers is a 69year old female.   Patient presents with:  Lab Results: f/u labs      HPI:   Patient comes for follow-up   C/c ckd , hl , line diabetes  C/o f/u on labs - noted  CKD  Since last visit she has seen the nephrologist, she has stopped lesions or rashes  RESPIRATORY: denies shortness of breath, cough, wheezing  CARDIOVASCULAR: denies chest pain on exertion, palpitations, swelling in feet  GI: denies abdominal pain and denies heartburn-takes PPI daily–we will try to wean her off, no nause levels    Borderline diabetes  Gave # for - jump start   Gave written information as well will recheck A1c in December--    GERD  --Advised her to try the pantoprazole every other day--aware symptoms may return    Preventative medicine  Labs 5/

## 2019-08-19 NOTE — PATIENT INSTRUCTIONS
Prediabetes  You have been diagnosed with prediabetes. This means that the level of sugar (glucose) in your blood is too high. If you have prediabetes, you are at risk for developing type 2 diabetes.  Type 2 diabetes is diagnosed when the level of glucose · Fasting glucose test. Blood is taken and tested after you have fasted (not eaten) for at least 8 hours. A normal test result is 99 milligrams per deciliter (mg/dL) or lower. Prediabetes is 100 mg/dL to 125 mg/dL. Diabetes is 126 mg/dL or higher.   · Gluco · Lose weight for no reason  · Feel numbness or tingling in your fingers or toes  · Have cuts or bruises that don’t seem to heal  · Have blurry vision  Date Last Reviewed: 5/1/2016  © 4469-8083 [employer].  [address] [place], [place]

## 2019-08-22 ENCOUNTER — PATIENT MESSAGE (OUTPATIENT)
Dept: INTERNAL MEDICINE CLINIC | Facility: CLINIC | Age: 74
End: 2019-08-22

## 2019-08-22 NOTE — TELEPHONE ENCOUNTER
From: Luis Quinn  To: Stefanie Bryant MD  Sent: 8/22/2019 11:27 AM CDT  Subject: Test Results Question    This [facility] I have a question about my Uris acid and cholesterol test. Thank You

## 2019-08-22 NOTE — TELEPHONE ENCOUNTER
Reviewed lab results from June and August with patient. Patient states will restart Allopurinol, has medication on hand does not need refill at this time.        Viewed by Leopoldo Schmid on 8/21/2019  2:47 PM   Written by Jayla Martines MD on 8/20/2019

## 2019-08-28 ENCOUNTER — APPOINTMENT (OUTPATIENT)
Dept: PHYSICAL THERAPY | Facility: HOSPITAL | Age: 74
End: 2019-08-28
Attending: INTERNAL MEDICINE
Payer: MEDICARE

## 2019-09-04 ENCOUNTER — OFFICE VISIT (OUTPATIENT)
Dept: PHYSICAL THERAPY | Facility: HOSPITAL | Age: 74
End: 2019-09-04
Attending: INTERNAL MEDICINE
Payer: MEDICARE

## 2019-09-04 DIAGNOSIS — M10.9 GOUT, UNSPECIFIED CAUSE, UNSPECIFIED CHRONICITY, UNSPECIFIED SITE: ICD-10-CM

## 2019-09-04 PROCEDURE — 97110 THERAPEUTIC EXERCISES: CPT

## 2019-09-04 NOTE — PROGRESS NOTES
Dx: Spondylosis of lumbar region without myelopathy or radiculopathy (M47.816)           Insurance (Authorized # of Visits):  [address] Physician: Dr. Filiberto Crain  Next MD visit: none scheduled  Fall Risk: standard         Precautions/PMH:  a frequency and intensity to be able to stand for 30 min to prepare a meal.  4. Patient will demo BLE strength at least 4-/5 to be able to walk for 20 min with SC.     Plan: Continue for stretching, strengthening, ROM, postural training, HEP training, manual

## 2019-09-05 RX ORDER — ALLOPURINOL 100 MG/1
TABLET ORAL
Qty: 90 TABLET | Refills: 1 | Status: SHIPPED | OUTPATIENT
Start: 2019-09-05 | End: 2020-05-02

## 2019-09-05 NOTE — TELEPHONE ENCOUNTER
Review pended refill request as it does not fall under a protocol.   Requested Prescriptions     Pending Prescriptions Disp Refills   • ALLOPURINOL 100 MG Oral Tab [Pharmacy Med Name: ALLOPURINOL 100MG TABLETS] 90 tablet 0     Sig: TAKE 1 TABLET(100 MG) BY

## 2019-09-10 ENCOUNTER — APPOINTMENT (OUTPATIENT)
Dept: PHYSICAL THERAPY | Facility: HOSPITAL | Age: 74
End: 2019-09-10
Attending: INTERNAL MEDICINE
Payer: MEDICARE

## 2019-09-17 ENCOUNTER — TELEPHONE (OUTPATIENT)
Dept: PHYSICAL THERAPY | Facility: HOSPITAL | Age: 74
End: 2019-09-17

## 2019-09-17 ENCOUNTER — APPOINTMENT (OUTPATIENT)
Dept: PHYSICAL THERAPY | Facility: HOSPITAL | Age: 74
End: 2019-09-17
Attending: INTERNAL MEDICINE
Payer: MEDICARE

## 2019-09-19 ENCOUNTER — APPOINTMENT (OUTPATIENT)
Dept: PHYSICAL THERAPY | Facility: HOSPITAL | Age: 74
End: 2019-09-19
Attending: INTERNAL MEDICINE
Payer: MEDICARE

## 2019-09-25 ENCOUNTER — OFFICE VISIT (OUTPATIENT)
Dept: PHYSICAL THERAPY | Facility: HOSPITAL | Age: 74
End: 2019-09-25
Attending: INTERNAL MEDICINE
Payer: MEDICARE

## 2019-09-25 PROCEDURE — 97110 THERAPEUTIC EXERCISES: CPT

## 2019-09-25 NOTE — PROGRESS NOTES
Dx: Spondylosis of lumbar region without myelopathy or radiculopathy (M47.816)           Insurance (Authorized # of Visits):  [address] Physician: Dr. Cherelle Lopez  Next MD visit: none scheduled  Fall Risk: standard         Precautions/PMH:  a prepare a meal.  4. Patient will demo BLE strength at least 4-/5 to be able to walk for 20 min with SC. Plan: Continue for stretching, strengthening, ROM, postural training, HEP training, manual therapy. Charges:  [employer]Ex3       Total Timed Treat

## 2019-10-02 ENCOUNTER — OFFICE VISIT (OUTPATIENT)
Dept: PHYSICAL THERAPY | Facility: HOSPITAL | Age: 74
End: 2019-10-02
Attending: INTERNAL MEDICINE
Payer: MEDICARE

## 2019-10-02 PROCEDURE — 97110 THERAPEUTIC EXERCISES: CPT

## 2019-10-02 NOTE — PROGRESS NOTES
Dx: Spondylosis of lumbar region without myelopathy or radiculopathy (M47.816)           Insurance (Authorized # of Visits):  [address] Physician: Dr. Pernell Arzola  Next MD visit: none scheduled  Fall Risk: standard         Precautions/PMH:  a and self-management of their symptoms.   2. Patient will demo lumbar AROM mod loss or better to be able to perform all transfers with pain <4/10.  3. Patient will report decrease in symptoms by 50% or better in terms of frequency and intensity to be able to

## 2019-10-08 ENCOUNTER — APPOINTMENT (OUTPATIENT)
Dept: PHYSICAL THERAPY | Facility: HOSPITAL | Age: 74
End: 2019-10-08
Attending: INTERNAL MEDICINE
Payer: MEDICARE

## 2019-10-10 ENCOUNTER — OFFICE VISIT (OUTPATIENT)
Dept: PHYSICAL THERAPY | Facility: HOSPITAL | Age: 74
End: 2019-10-10
Attending: INTERNAL MEDICINE
Payer: MEDICARE

## 2019-10-10 PROCEDURE — 97110 THERAPEUTIC EXERCISES: CPT

## 2019-10-10 NOTE — PROGRESS NOTES
Dx: Spondylosis of lumbar region without myelopathy or radiculopathy (M47.816)           Insurance (Authorized # of Visits):  [address] Physician: Dr. Hiral Fu  Next MD visit: none scheduled  Fall Risk: standard         Precautions/PMH:  a mobility. Tolerates progression of mat core strengthening without exacerbation of symptoms. Goals:   1. Patient will be independent with HEP, it's progression, and self-management of their symptoms.   2. Patient will demo lumbar AROM mod loss or better

## 2019-10-16 ENCOUNTER — TELEPHONE (OUTPATIENT)
Dept: PHYSICAL THERAPY | Facility: HOSPITAL | Age: 74
End: 2019-10-16

## 2019-10-17 ENCOUNTER — APPOINTMENT (OUTPATIENT)
Dept: PHYSICAL THERAPY | Facility: HOSPITAL | Age: 74
End: 2019-10-17
Attending: INTERNAL MEDICINE
Payer: MEDICARE

## 2019-10-23 ENCOUNTER — PATIENT MESSAGE (OUTPATIENT)
Dept: INTERNAL MEDICINE CLINIC | Facility: CLINIC | Age: 74
End: 2019-10-23

## 2019-10-24 ENCOUNTER — APPOINTMENT (OUTPATIENT)
Dept: PHYSICAL THERAPY | Facility: HOSPITAL | Age: 74
End: 2019-10-24
Attending: INTERNAL MEDICINE
Payer: MEDICARE

## 2019-10-25 NOTE — TELEPHONE ENCOUNTER
[employer] message sent. From: Siddharth Lozada  To: Zeny Crockett MD  Sent: 10/23/2019  1:38 PM CDT  Subject: Test Results Question    This Siddharth Lozada. I have a question about the X-ray I had I June 14/2019.  It says I have  surgical clip in my

## 2019-10-29 ENCOUNTER — APPOINTMENT (OUTPATIENT)
Dept: PHYSICAL THERAPY | Facility: HOSPITAL | Age: 74
End: 2019-10-29
Attending: INTERNAL MEDICINE
Payer: MEDICARE

## 2019-10-30 ENCOUNTER — APPOINTMENT (OUTPATIENT)
Dept: PHYSICAL THERAPY | Facility: HOSPITAL | Age: 74
End: 2019-10-30
Attending: INTERNAL MEDICINE
Payer: MEDICARE

## 2019-11-05 ENCOUNTER — APPOINTMENT (OUTPATIENT)
Dept: PHYSICAL THERAPY | Facility: HOSPITAL | Age: 74
End: 2019-11-05
Attending: INTERNAL MEDICINE
Payer: MEDICARE

## 2019-11-07 NOTE — PROGRESS NOTES
Discharge Summary  Pt has attended 5 visits in Physical Therapy. Assessment: Patient attended 5 appointments then canceled final appointment stating that she was feeling good and no longer needed PT.  Patient will be discharged at this time per efrain

## 2019-11-12 ENCOUNTER — APPOINTMENT (OUTPATIENT)
Dept: PHYSICAL THERAPY | Facility: HOSPITAL | Age: 74
End: 2019-11-12
Attending: INTERNAL MEDICINE
Payer: MEDICARE

## 2019-11-12 ENCOUNTER — PATIENT MESSAGE (OUTPATIENT)
Dept: INTERNAL MEDICINE CLINIC | Facility: CLINIC | Age: 74
End: 2019-11-12

## 2019-11-13 RX ORDER — AMLODIPINE BESYLATE 5 MG/1
5 TABLET ORAL DAILY
Qty: 90 TABLET | Refills: 1 | Status: SHIPPED | OUTPATIENT
Start: 2019-11-13 | End: 2020-04-27

## 2019-11-13 NOTE — TELEPHONE ENCOUNTER
Per chart review amlodipine 5 mg that pt is requesting on [employer] message was d/c'd from med list during 6/3/19 OV when Dr Shelia Ordonez prescribed Triamterene-HCTZ 37.5-25 mg:     AmLODIPine Besylate 5 MG Oral Tab (Discontinued)  2 11/28/2017 6/3/2019    Sig: TK

## 2019-11-13 NOTE — TELEPHONE ENCOUNTER
From: Nathaniel Gaston  To: Beltran Valadez MD  Sent: 11/12/2019 5:30 PM CST  Subject: Other    This Nathaniel Gaston I need a refill for amlodipine besylate 5MG.  Thank you

## 2019-12-18 ENCOUNTER — NURSE TRIAGE (OUTPATIENT)
Dept: OTHER | Age: 74
End: 2019-12-18

## 2019-12-18 NOTE — TELEPHONE ENCOUNTER
Patient informed of provider's response/recommendations below and pt agrees.  Scheduled appt for tomorrow at 12:30 pm with Dr Tom Arias.

## 2019-12-18 NOTE — TELEPHONE ENCOUNTER
Action Requested: Summary for Provider     []  Critical Lab, Recommendations Needed  [x] Need Additional Advice  []   FYI    []   Need Orders  [] Need Medications Sent to Pharmacy  []  Other     SUMMARY: pt states she has had some dizziness that started to

## 2019-12-18 NOTE — TELEPHONE ENCOUNTER
pls help pt with apt for tomorrow , can try Claritin, Zyrtec or Xyzal in case it is allergy related--if any alarming symptoms and will need immediate evaluation and first available/urgent care/walk-in clinic etc

## 2019-12-27 ENCOUNTER — APPOINTMENT (OUTPATIENT)
Dept: GENERAL RADIOLOGY | Facility: HOSPITAL | Age: 74
End: 2019-12-27
Attending: EMERGENCY MEDICINE
Payer: MEDICARE

## 2019-12-27 ENCOUNTER — HOSPITAL ENCOUNTER (EMERGENCY)
Facility: HOSPITAL | Age: 74
Discharge: HOME | End: 2019-12-27
Attending: EMERGENCY MEDICINE
Payer: MEDICARE

## 2019-12-27 VITALS
WEIGHT: 162 LBS | TEMPERATURE: 102 F | BODY MASS INDEX: 26.03 KG/M2 | SYSTOLIC BLOOD PRESSURE: 131 MMHG | HEART RATE: 83 BPM | DIASTOLIC BLOOD PRESSURE: 63 MMHG | RESPIRATION RATE: 22 BRPM | OXYGEN SATURATION: 98 % | HEIGHT: 66 IN

## 2019-12-27 DIAGNOSIS — B34.9 VIRAL SYNDROME: Primary | ICD-10-CM

## 2019-12-27 PROCEDURE — 71046 X-RAY EXAM CHEST 2 VIEWS: CPT | Performed by: EMERGENCY MEDICINE

## 2019-12-27 PROCEDURE — 99283 EMERGENCY DEPT VISIT LOW MDM: CPT

## 2019-12-27 RX ORDER — ACETAMINOPHEN 500 MG
1000 TABLET ORAL ONCE
Status: COMPLETED | OUTPATIENT
Start: 2019-12-27 | End: 2019-12-27

## 2019-12-27 RX ORDER — GUAIFENESIN 600 MG
1200 TABLET, EXTENDED RELEASE 12 HR ORAL 2 TIMES DAILY
Qty: 20 TABLET | Refills: 0 | Status: SHIPPED | OUTPATIENT
Start: 2019-12-27 | End: 2020-07-20

## 2019-12-27 RX ORDER — ACETAMINOPHEN 500 MG
500 TABLET ORAL EVERY 4 HOURS PRN
Qty: 20 TABLET | Refills: 0 | Status: SHIPPED | OUTPATIENT
Start: 2019-12-27 | End: 2021-10-25

## 2019-12-27 NOTE — ED PROVIDER NOTES
Patient Seen in: [facility] AND [facility] Emergency Department      History   Patient presents with:  Cough/URI    Stated Complaint: cough, body ache, headache    HPI    15-year-old female with history of hypertension, hyperlipidemia, anxiety, here with complai systems reviewed and negative except as noted above.     Physical Exam     ED Triage Vitals [12/27/19 0917]   /80   Pulse 117   Resp 22   Temp 100.2 °F (37.9 °C)   Temp src    SpO2 96 %   O2 Device None (Room air)       Current:/80   Pulse 117 1. Atherosclerosis. 2. Scarring/atelectasis. 3. Kyphoscoliosis. 4. Demineralization. 5. Osteoarthritis. 6. Chronic appearing wedging of multiple vertebra. Dictated by (CST): Erendira Merritt MD on 12/27/2019 at 10:47     Approved by (CST):  Noni Macias Clinical Impression:  Viral syndrome  (primary encounter diagnosis)    Disposition:  Discharge  12/27/2019 11:07 am    Follow-up:  Sourav Myers, [address] [facility]  [phone]    Schedule an appointment as soon as possible for a vis

## 2019-12-30 ENCOUNTER — TELEPHONE (OUTPATIENT)
Dept: INTERNAL MEDICINE CLINIC | Facility: CLINIC | Age: 74
End: 2019-12-30

## 2019-12-30 ENCOUNTER — OFFICE VISIT (OUTPATIENT)
Dept: INTERNAL MEDICINE CLINIC | Facility: CLINIC | Age: 74
End: 2019-12-30
Payer: MEDICARE

## 2019-12-30 VITALS
DIASTOLIC BLOOD PRESSURE: 73 MMHG | TEMPERATURE: 99 F | BODY MASS INDEX: 42.11 KG/M2 | SYSTOLIC BLOOD PRESSURE: 129 MMHG | HEIGHT: 66 IN | WEIGHT: 262 LBS | HEART RATE: 62 BPM

## 2019-12-30 DIAGNOSIS — J45.20 MILD INTERMITTENT ASTHMATIC BRONCHITIS WITHOUT COMPLICATION: Primary | ICD-10-CM

## 2019-12-30 PROCEDURE — G0463 HOSPITAL OUTPT CLINIC VISIT: HCPCS | Performed by: PHYSICIAN ASSISTANT

## 2019-12-30 PROCEDURE — 99213 OFFICE O/P EST LOW 20 MIN: CPT | Performed by: PHYSICIAN ASSISTANT

## 2019-12-30 RX ORDER — BENZONATATE 100 MG/1
100 CAPSULE ORAL 3 TIMES DAILY PRN
Qty: 30 CAPSULE | Refills: 0 | Status: SHIPPED | OUTPATIENT
Start: 2019-12-30 | End: 2020-01-18

## 2019-12-30 RX ORDER — PREDNISONE 10 MG/1
TABLET ORAL
Qty: 18 TABLET | Refills: 0 | Status: SHIPPED | OUTPATIENT
Start: 2019-12-30 | End: 2020-01-08 | Stop reason: ALTCHOICE

## 2019-12-30 NOTE — PROGRESS NOTES
HPI:    Patient ID: Kerri Garcia is a 69year old female. HPI   Patient presents for cough, shes had for a few weeks now.  Did go to ER for eval and was told it was viral. Pt presents today with ongoing cough, headache, congestion and difficulty sl Allergies:  Codeine                   History:  Past Medical History:   Diagnosis Date   • Anxiety    • Essential hypertension    • Hyperlipidemia      Social History    Tobacco Use      Smoking status: Never Smoker      Smokeless tobacco: Never Used sx persist or worsen she is to follow up  -tylenol every 6 hours if needed for aches and fever  No orders of the defined types were placed in this encounter. No follow-ups on file.     Meds This Visit:  Requested Prescriptions     Signed Prescriptions Ingrid Joshi

## 2019-12-30 NOTE — TELEPHONE ENCOUNTER
Patient states she was in ER for cough and headaches on 12/27. Wanted to make follow up appointment but states symptoms have gotten worse and is now feeling weak.

## 2019-12-30 NOTE — TELEPHONE ENCOUNTER
Scheduled to see Darius Cummins today 12/30/19 at 3 pm Beryle Chain. ER 12/27/19. She said she was feeling better, but then worsened. Stated sinus drainage, PND. Feeling nauseated.  Diarrhea of soft stools, one last night, one this morning, no bloody/tarry/black st

## 2020-01-03 ENCOUNTER — NURSE TRIAGE (OUTPATIENT)
Dept: OTHER | Age: 75
End: 2020-01-03

## 2020-01-04 ENCOUNTER — HOSPITAL ENCOUNTER (EMERGENCY)
Facility: HOSPITAL | Age: 75
Discharge: HOME | End: 2020-01-04
Attending: EMERGENCY MEDICINE
Payer: MEDICARE

## 2020-01-04 ENCOUNTER — APPOINTMENT (OUTPATIENT)
Dept: GENERAL RADIOLOGY | Facility: HOSPITAL | Age: 75
End: 2020-01-04
Payer: MEDICARE

## 2020-01-04 VITALS
RESPIRATION RATE: 18 BRPM | HEIGHT: 66 IN | OXYGEN SATURATION: 96 % | WEIGHT: 261 LBS | DIASTOLIC BLOOD PRESSURE: 87 MMHG | BODY MASS INDEX: 41.95 KG/M2 | HEART RATE: 92 BPM | SYSTOLIC BLOOD PRESSURE: 148 MMHG | TEMPERATURE: 99 F

## 2020-01-04 DIAGNOSIS — J01.90 ACUTE SINUSITIS, RECURRENCE NOT SPECIFIED, UNSPECIFIED LOCATION: Primary | ICD-10-CM

## 2020-01-04 DIAGNOSIS — J40 BRONCHITIS: ICD-10-CM

## 2020-01-04 PROCEDURE — 99283 EMERGENCY DEPT VISIT LOW MDM: CPT

## 2020-01-04 PROCEDURE — 71046 X-RAY EXAM CHEST 2 VIEWS: CPT | Performed by: EMERGENCY MEDICINE

## 2020-01-04 RX ORDER — BENZONATATE 100 MG/1
100 CAPSULE ORAL 3 TIMES DAILY PRN
Qty: 30 CAPSULE | Refills: 0 | Status: SHIPPED | OUTPATIENT
Start: 2020-01-04 | End: 2020-02-03

## 2020-01-04 RX ORDER — PREDNISONE 20 MG/1
40 TABLET ORAL DAILY
Qty: 8 TABLET | Refills: 0 | Status: SHIPPED | OUTPATIENT
Start: 2020-01-04 | End: 2020-01-08 | Stop reason: ALTCHOICE

## 2020-01-04 RX ORDER — AMOXICILLIN 875 MG/1
875 TABLET, COATED ORAL 2 TIMES DAILY
Qty: 14 TABLET | Refills: 0 | Status: SHIPPED | OUTPATIENT
Start: 2020-01-04 | End: 2020-01-11

## 2020-01-04 NOTE — TELEPHONE ENCOUNTER
Action Requested: Summary for Provider     []  Critical Lab, Recommendations Needed  [] Need Additional Advice  []   FYI    []   Need Orders  [] Need Medications Sent to Pharmacy  []  Other     SUMMARY: Patient advised to go to IC today for continuous coug

## 2020-01-04 NOTE — ED PROVIDER NOTES
Patient Seen in: [facility] AND [facility] Emergency Department    History   No chief complaint on file. Stated Complaint: Sinus infection     HPI    Patient here with cough, congestion for several days.  Sinus congestion seen in ER seen in ER given cough meds Triamterene-HCTZ 37.5-25 MG Oral Cap,  One tablet daily   Pantoprazole Sodium 40 MG Oral Tab EC,  TK 1 T PO QD       Family History   Problem Relation Age of Onset   • Hypertension Father    • Kidney Disease Father    • Diabetes Neg    • Glaucoma Neg    • 2. Minimal linear atelectasis or scar in lower lungs. 3. Atherosclerosis aorta.     Dictated by (CST): Andrea Lino MD on 1/04/2020 at 13:45     Approved by (CST): Andrea Lino MD on 1/04/2020 at 13:47          Xr Chest Pa + Lat Chest (cpt=71046)    Res

## 2020-01-04 NOTE — TELEPHONE ENCOUNTER
----- Message from Ruddy Villafana sent at 1/3/2020  3:52 PM CST -----  Regarding: Other  Contact: [phone]  This is Ruddy Villafana  I need to see Dr Salvador Young for this cough I can’t get rid of  Thank You phone [phone]

## 2020-01-06 NOTE — TELEPHONE ENCOUNTER
Patient was seen in the ER on 1/4/20. Encounter routed to the call center to schedule an ER follow-up appointment.        Disposition and Plan      Clinical Impression:  Acute sinusitis, recurrence not specified, unspecified location  (primary encounter eboni

## 2020-01-08 ENCOUNTER — OFFICE VISIT (OUTPATIENT)
Dept: INTERNAL MEDICINE CLINIC | Facility: CLINIC | Age: 75
End: 2020-01-08
Payer: COMMERCIAL

## 2020-01-08 VITALS
RESPIRATION RATE: 19 BRPM | HEART RATE: 74 BPM | WEIGHT: 248 LBS | BODY MASS INDEX: 39.86 KG/M2 | TEMPERATURE: 98 F | SYSTOLIC BLOOD PRESSURE: 117 MMHG | HEIGHT: 66 IN | DIASTOLIC BLOOD PRESSURE: 79 MMHG

## 2020-01-08 DIAGNOSIS — F41.9 ANXIETY: ICD-10-CM

## 2020-01-08 DIAGNOSIS — I10 ESSENTIAL HYPERTENSION: ICD-10-CM

## 2020-01-08 DIAGNOSIS — J06.9 UPPER RESPIRATORY TRACT INFECTION, UNSPECIFIED TYPE: ICD-10-CM

## 2020-01-08 DIAGNOSIS — E79.0 ELEVATED BLOOD URIC ACID LEVEL: Primary | ICD-10-CM

## 2020-01-08 PROCEDURE — 99214 OFFICE O/P EST MOD 30 MIN: CPT | Performed by: INTERNAL MEDICINE

## 2020-01-08 PROCEDURE — G0463 HOSPITAL OUTPT CLINIC VISIT: HCPCS | Performed by: INTERNAL MEDICINE

## 2020-01-08 NOTE — PROGRESS NOTES
Kerri Garcia is a 69year old female.   Patient presents with:  ER F/U      HPI:   Pt comes for f/u   C/C cough and congestion  C/o started for after Christmas i.e. 2 weeks  Weaning herself off the sertraline– taking half a pill every other day now  W every other day.       • atorvastatin 40 MG Oral Tab One tablet daily 90 tablet 2   • Triamterene-HCTZ 37.5-25 MG Oral Cap One tablet daily 90 capsule 3   • Pantoprazole Sodium 40 MG Oral Tab EC TK 1 T PO QD  2      Past Medical History:   Diagnosis Date x-ray was normal  She is weaning herself off the sertraline and taking half a tablet every other day  Noted because her uric acid is elevated that she has been taking the allopurinol–would continue--noted it was 5.5 June 2019 when she was on the allopurino

## 2020-01-10 ENCOUNTER — TELEPHONE (OUTPATIENT)
Dept: OTHER | Age: 75
End: 2020-01-10

## 2020-01-10 NOTE — TELEPHONE ENCOUNTER
Pt states she had two more doses of Amoxicillin 875 mg left and she accidentally dropped those tablets down the drain. Pt is asking if she needs to have another prescription sent to pharmacy or if she has taken enough of the med.  Pt states at this time she

## 2020-01-18 DIAGNOSIS — I10 ESSENTIAL HYPERTENSION: ICD-10-CM

## 2020-01-19 NOTE — TELEPHONE ENCOUNTER
Review pended refill request as it does not fall under a protocol.     Last Rx: 1/4/20  LOV: 1 week ago

## 2020-01-19 NOTE — TELEPHONE ENCOUNTER
Please review; protocol failed.  D/t GFR  Requested Prescriptions     Pending Prescriptions Disp Refills   • Triamterene-HCTZ 37.5-25 MG Oral Cap 90 capsule 3     Sig: One tablet daily         Recent Visits  Date Type Provider Dept   01/08/20 Office Visit A

## 2020-01-20 RX ORDER — TRIAMTERENE AND HYDROCHLOROTHIAZIDE 37.5; 25 MG/1; MG/1
CAPSULE ORAL
Qty: 90 CAPSULE | Refills: 3 | Status: SHIPPED | OUTPATIENT
Start: 2020-01-20 | End: 2020-07-20

## 2020-01-20 RX ORDER — BENZONATATE 100 MG/1
100 CAPSULE ORAL 3 TIMES DAILY PRN
Qty: 30 CAPSULE | Refills: 0 | Status: SHIPPED | OUTPATIENT
Start: 2020-01-20 | End: 2020-07-20

## 2020-02-18 ENCOUNTER — TELEPHONE (OUTPATIENT)
Dept: INTERNAL MEDICINE CLINIC | Facility: CLINIC | Age: 75
End: 2020-02-18

## 2020-02-18 DIAGNOSIS — M17.0 PRIMARY OSTEOARTHRITIS OF BOTH KNEES: Primary | ICD-10-CM

## 2020-02-18 DIAGNOSIS — E78.2 MIXED HYPERLIPIDEMIA: ICD-10-CM

## 2020-02-18 NOTE — TELEPHONE ENCOUNTER
Spoke with patient regarding chronic knee and back pain which she states you are aware of. States she \"has real bad arthritis. \" Tylenol arthritis does not help. Asking if you can prescribe Ibuprofen 800 mg tablets. Please advise.

## 2020-02-18 NOTE — TELEPHONE ENCOUNTER
Will avoid NSAIDs including ibuprofen due to CKD  She was referred to physiatry-- but I dont see any notes --not sure if you want, may benefit from an Ortho eval for injections--we will put an order

## 2020-02-18 NOTE — TELEPHONE ENCOUNTER
----- Message from Layo Cha sent at 2/18/2020  1:01 AM CST -----  Regarding: Other  Contact: [phone]  Can I get a prescription for lbuprofen 800mg tablets. I need it for my back and knee pain. from Layo Cha. [phone].      Than

## 2020-02-19 RX ORDER — ATORVASTATIN CALCIUM 40 MG/1
TABLET, FILM COATED ORAL
Qty: 90 TABLET | Refills: 1 | Status: SHIPPED | OUTPATIENT
Start: 2020-02-19 | End: 2020-07-20

## 2020-02-19 NOTE — TELEPHONE ENCOUNTER
Informed pt provider's instructions. Pt states she is already receiving injections for her knees. Was requesting medication for her back. States she will see the ortho doctor for her back as well.  Routed to provider PARAS

## 2020-02-19 NOTE — TELEPHONE ENCOUNTER
Please review; protocol failed.      Requested Prescriptions     Pending Prescriptions Disp Refills   • ATORVASTATIN 40 MG Oral Tab [Pharmacy Med Name: ATORVASTATIN 40MG TABLETS] 90 tablet 2     Sig: TAKE 1 TABLET BY MOUTH DAILY         Recent Visits  Date

## 2020-02-23 ENCOUNTER — PATIENT MESSAGE (OUTPATIENT)
Dept: INTERNAL MEDICINE CLINIC | Facility: CLINIC | Age: 75
End: 2020-02-23

## 2020-02-24 ENCOUNTER — TELEPHONE (OUTPATIENT)
Dept: INTERNAL MEDICINE CLINIC | Facility: CLINIC | Age: 75
End: 2020-02-24

## 2020-02-24 NOTE — TELEPHONE ENCOUNTER
She can try over-the-counter Claritin 10 mg once a day, or Zyrtec or Xyzal for the next 7 to 10 days, steam inhalation  Saline nasal spray  If needed she can try Flonase  If a lot of discharge then mucinex

## 2020-02-24 NOTE — TELEPHONE ENCOUNTER
On-site RN, please call patient and triage regarding MyChart message copied here. MyChart message response sent in original MyChart encounter, per department process.   ----- Message from Filbert Kehr sent at 2/23/2020  1:20 PM CST -----  Regarding: Navid Moore

## 2020-02-24 NOTE — TELEPHONE ENCOUNTER
From: Felix Moy  To: Tee Riojas MD  Sent: 2/23/2020 1:20 PM CST  Subject: Other    This is Felix Moy. What kind of medication can I take for sinus while taking sertraline. I have bad sinus pressure. From Felix Moy.  [phone]

## 2020-02-26 NOTE — TELEPHONE ENCOUNTER
Pt calling back to report she tried the Claritin and Zyrtec and no relief. Pt asking if ok to take Mucinex and pt advised per MD noted below ok to take Mucinex. Pt verb understanding.

## 2020-03-11 ENCOUNTER — OFFICE VISIT (OUTPATIENT)
Dept: INTERNAL MEDICINE CLINIC | Facility: CLINIC | Age: 75
End: 2020-03-11
Payer: COMMERCIAL

## 2020-03-11 VITALS
WEIGHT: 259.19 LBS | HEIGHT: 66 IN | SYSTOLIC BLOOD PRESSURE: 116 MMHG | BODY MASS INDEX: 41.66 KG/M2 | DIASTOLIC BLOOD PRESSURE: 64 MMHG | HEART RATE: 69 BPM

## 2020-03-11 DIAGNOSIS — K21.9 GASTROESOPHAGEAL REFLUX DISEASE WITHOUT ESOPHAGITIS: Primary | ICD-10-CM

## 2020-03-11 DIAGNOSIS — M62.838 MUSCLE SPASM: ICD-10-CM

## 2020-03-11 PROCEDURE — G0463 HOSPITAL OUTPT CLINIC VISIT: HCPCS | Performed by: PHYSICIAN ASSISTANT

## 2020-03-11 PROCEDURE — 99213 OFFICE O/P EST LOW 20 MIN: CPT | Performed by: PHYSICIAN ASSISTANT

## 2020-03-11 NOTE — PROGRESS NOTES
HPI:    Patient ID: Megan Hernandez is a 77year old female. HPI  Left breast near sternum, flutter feeling mostly after eating or when she has acid reflux. Been going on for a month. Has mammogram order pending by PCP.  Increased cough due to acid re • OTHER SURGICAL HISTORY     \  Family History   Problem Relation Age of Onset   • Hypertension Father    • Kidney Disease Father    • Diabetes Neg    • Glaucoma Neg    • Macular degeneration Neg          PHYSICAL EXAM:   /64 (BP Location: Right arm, Requested Prescriptions      No prescriptions requested or ordered in this encounter       Imaging & Referrals:  None         ID#[id number]

## 2020-04-07 ENCOUNTER — PATIENT MESSAGE (OUTPATIENT)
Dept: INTERNAL MEDICINE CLINIC | Facility: CLINIC | Age: 75
End: 2020-04-07

## 2020-04-07 NOTE — TELEPHONE ENCOUNTER
From: Alveta Medicus  To: Brielle Salazar MD  Sent: 4/7/2020 1:47 PM CDT  Subject: Non-Urgent Medical Question    This is Alveta Medicus. I take Tylenol arthritis tablet.  Can I take Muchinex sinus-max with that? [phone]

## 2020-04-15 ENCOUNTER — PATIENT MESSAGE (OUTPATIENT)
Dept: INTERNAL MEDICINE CLINIC | Facility: CLINIC | Age: 75
End: 2020-04-15

## 2020-04-16 ENCOUNTER — NURSE TRIAGE (OUTPATIENT)
Dept: OTHER | Age: 75
End: 2020-04-16

## 2020-04-16 NOTE — TELEPHONE ENCOUNTER
Action Requested: Summary for Provider     []  Critical Lab, Recommendations Needed  [] Need Additional Advice  []   FYI    []   Need Orders  [] Need Medications Sent to Pharmacy  []  Other     SUMMARY: Patient asking to take test for COVID-19 Patient has

## 2020-04-16 NOTE — TELEPHONE ENCOUNTER
From: Neno Norman  To:  Sanjana Santana MD  Sent: 4/15/2020 7:27 PM CDT  Subject: Non-Urgent Medical Question    This Neno Norman can I come to the office for a covid19 test [phone] Thank You

## 2020-04-16 NOTE — TELEPHONE ENCOUNTER
Please call to Triage, previously Pt had sinus s/s 4/7/20  ----- Message from Neno Norman sent at 4/15/2020  7:27 PM CDT -----  Regarding: Non-Urgent Medical Question  Contact: [phone]  This Neno Norman  can I come to the office for a co

## 2020-04-27 ENCOUNTER — PATIENT MESSAGE (OUTPATIENT)
Dept: INTERNAL MEDICINE CLINIC | Facility: CLINIC | Age: 75
End: 2020-04-27

## 2020-04-28 ENCOUNTER — TELEPHONE (OUTPATIENT)
Dept: INTERNAL MEDICINE CLINIC | Facility: CLINIC | Age: 75
End: 2020-04-28

## 2020-04-28 RX ORDER — AMLODIPINE BESYLATE 5 MG/1
5 TABLET ORAL DAILY
Qty: 90 TABLET | Refills: 1 | Status: SHIPPED | OUTPATIENT
Start: 2020-04-28 | End: 2020-07-20

## 2020-04-28 RX ORDER — AMLODIPINE BESYLATE 5 MG/1
5 TABLET ORAL DAILY
Qty: 90 TABLET | Refills: 1 | Status: SHIPPED | OUTPATIENT
Start: 2020-04-28 | End: 2020-04-28

## 2020-04-28 NOTE — TELEPHONE ENCOUNTER
From: Apple Calhoun  To: Amirah Antonio MD  Sent: 4/27/2020 5:45 PM CDT  Subject: Non-Urgent Medical Question    This Apple Calhoun I need a refill for Amlodpine Besylate 5 mg. [phone]. Thank You.

## 2020-05-01 DIAGNOSIS — M10.9 GOUT, UNSPECIFIED CAUSE, UNSPECIFIED CHRONICITY, UNSPECIFIED SITE: ICD-10-CM

## 2020-05-02 RX ORDER — ALLOPURINOL 100 MG/1
TABLET ORAL
Qty: 90 TABLET | Refills: 1 | Status: SHIPPED | OUTPATIENT
Start: 2020-05-02 | End: 2020-07-20

## 2020-06-09 ENCOUNTER — TELEPHONE (OUTPATIENT)
Dept: CASE MANAGEMENT | Age: 75
End: 2020-06-09

## 2020-06-15 ENCOUNTER — TELEPHONE (OUTPATIENT)
Dept: CASE MANAGEMENT | Age: 75
End: 2020-06-15

## 2020-06-18 ENCOUNTER — MA CHART PREP (OUTPATIENT)
Dept: FAMILY MEDICINE CLINIC | Facility: CLINIC | Age: 75
End: 2020-06-18

## 2020-06-18 PROBLEM — I70.0 ATHEROSCLEROSIS OF AORTA: Status: ACTIVE | Noted: 2020-06-18

## 2020-06-18 PROBLEM — I70.0 ATHEROSCLEROSIS OF AORTA (HCC): Status: ACTIVE | Noted: 2020-06-18

## 2020-06-18 PROBLEM — E66.01 OBESITY, MORBID, BMI 40.0-49.9 (HCC): Status: ACTIVE | Noted: 2020-06-18

## 2020-07-20 ENCOUNTER — LAB ENCOUNTER (OUTPATIENT)
Dept: LAB | Age: 75
End: 2020-07-20
Attending: INTERNAL MEDICINE
Payer: MEDICARE

## 2020-07-20 ENCOUNTER — OFFICE VISIT (OUTPATIENT)
Dept: INTERNAL MEDICINE CLINIC | Facility: CLINIC | Age: 75
End: 2020-07-20
Payer: COMMERCIAL

## 2020-07-20 VITALS
SYSTOLIC BLOOD PRESSURE: 125 MMHG | WEIGHT: 268 LBS | BODY MASS INDEX: 43.07 KG/M2 | TEMPERATURE: 97 F | DIASTOLIC BLOOD PRESSURE: 65 MMHG | HEIGHT: 66 IN | HEART RATE: 60 BPM

## 2020-07-20 DIAGNOSIS — F41.9 ANXIETY: ICD-10-CM

## 2020-07-20 DIAGNOSIS — N18.30 CKD (CHRONIC KIDNEY DISEASE) STAGE 3, GFR 30-59 ML/MIN (HCC): ICD-10-CM

## 2020-07-20 DIAGNOSIS — Z12.31 VISIT FOR SCREENING MAMMOGRAM: ICD-10-CM

## 2020-07-20 DIAGNOSIS — Z11.59 NEED FOR HEPATITIS C SCREENING TEST: ICD-10-CM

## 2020-07-20 DIAGNOSIS — E66.01 OBESITY, MORBID, BMI 40.0-49.9 (HCC): ICD-10-CM

## 2020-07-20 DIAGNOSIS — H02.886 MEIBOMIAN GLAND DYSFUNCTION (MGD) OF BOTH EYES: ICD-10-CM

## 2020-07-20 DIAGNOSIS — Z23 NEED FOR VACCINATION: ICD-10-CM

## 2020-07-20 DIAGNOSIS — M10.9 GOUT, UNSPECIFIED CAUSE, UNSPECIFIED CHRONICITY, UNSPECIFIED SITE: ICD-10-CM

## 2020-07-20 DIAGNOSIS — F32.0 CURRENT MILD EPISODE OF MAJOR DEPRESSIVE DISORDER, UNSPECIFIED WHETHER RECURRENT (HCC): ICD-10-CM

## 2020-07-20 DIAGNOSIS — I10 ESSENTIAL HYPERTENSION: ICD-10-CM

## 2020-07-20 DIAGNOSIS — G56.03 BILATERAL CARPAL TUNNEL SYNDROME: ICD-10-CM

## 2020-07-20 DIAGNOSIS — Z91.89 AT RISK FOR DECREASED BONE DENSITY: ICD-10-CM

## 2020-07-20 DIAGNOSIS — R73.03 BORDERLINE DIABETES: ICD-10-CM

## 2020-07-20 DIAGNOSIS — K21.9 GASTROESOPHAGEAL REFLUX DISEASE WITHOUT ESOPHAGITIS: ICD-10-CM

## 2020-07-20 DIAGNOSIS — I70.0 ATHEROSCLEROSIS OF AORTA (HCC): ICD-10-CM

## 2020-07-20 DIAGNOSIS — M47.819 ARTHRITIS OF LOW BACK: ICD-10-CM

## 2020-07-20 DIAGNOSIS — E79.0 ELEVATED BLOOD URIC ACID LEVEL: ICD-10-CM

## 2020-07-20 DIAGNOSIS — Z12.11 ENCOUNTER FOR SCREENING COLONOSCOPY: ICD-10-CM

## 2020-07-20 DIAGNOSIS — Z78.0 ASYMPTOMATIC MENOPAUSAL STATE: ICD-10-CM

## 2020-07-20 DIAGNOSIS — H02.883 MEIBOMIAN GLAND DYSFUNCTION (MGD) OF BOTH EYES: ICD-10-CM

## 2020-07-20 DIAGNOSIS — Z00.00 ENCOUNTER FOR ANNUAL HEALTH EXAMINATION: ICD-10-CM

## 2020-07-20 DIAGNOSIS — Z00.00 ENCOUNTER FOR ANNUAL HEALTH EXAMINATION: Primary | ICD-10-CM

## 2020-07-20 DIAGNOSIS — R05.3 CHRONIC COUGH: ICD-10-CM

## 2020-07-20 DIAGNOSIS — E78.2 MIXED HYPERLIPIDEMIA: ICD-10-CM

## 2020-07-20 DIAGNOSIS — M17.0 PRIMARY OSTEOARTHRITIS OF BOTH KNEES: ICD-10-CM

## 2020-07-20 PROBLEM — R42 DIZZINESS: Status: RESOLVED | Noted: 2019-05-22 | Resolved: 2020-07-20

## 2020-07-20 LAB
ALBUMIN SERPL-MCNC: 3.5 G/DL (ref 3.4–5)
ALBUMIN/GLOB SERPL: 0.8 {RATIO} (ref 1–2)
ALP LIVER SERPL-CCNC: 123 U/L (ref 55–142)
ALT SERPL-CCNC: 30 U/L (ref 13–56)
ANION GAP SERPL CALC-SCNC: 2 MMOL/L (ref 0–18)
AST SERPL-CCNC: 21 U/L (ref 15–37)
BASOPHILS # BLD AUTO: 0.08 X10(3) UL (ref 0–0.2)
BASOPHILS NFR BLD AUTO: 1.4 %
BILIRUB SERPL-MCNC: 0.6 MG/DL (ref 0.1–2)
BUN BLD-MCNC: 19 MG/DL (ref 7–18)
BUN/CREAT SERPL: 16.1 (ref 10–20)
CALCIUM BLD-MCNC: 9.4 MG/DL (ref 8.5–10.1)
CHLORIDE SERPL-SCNC: 106 MMOL/L (ref 98–112)
CHOLEST SMN-MCNC: 213 MG/DL (ref ?–200)
CO2 SERPL-SCNC: 32 MMOL/L (ref 21–32)
CREAT BLD-MCNC: 1.18 MG/DL (ref 0.55–1.02)
DEPRECATED RDW RBC AUTO: 43.2 FL (ref 35.1–46.3)
EOSINOPHIL # BLD AUTO: 0.18 X10(3) UL (ref 0–0.7)
EOSINOPHIL NFR BLD AUTO: 3.2 %
ERYTHROCYTE [DISTWIDTH] IN BLOOD BY AUTOMATED COUNT: 13.6 % (ref 11–15)
EST. AVERAGE GLUCOSE BLD GHB EST-MCNC: 131 MG/DL (ref 68–126)
GLOBULIN PLAS-MCNC: 4.4 G/DL (ref 2.8–4.4)
GLUCOSE BLD-MCNC: 111 MG/DL (ref 70–99)
HBA1C MFR BLD HPLC: 6.2 % (ref ?–5.7)
HCT VFR BLD AUTO: 41 % (ref 35–48)
HCV AB SERPL QL IA: NONREACTIVE
HDLC SERPL-MCNC: 51 MG/DL (ref 40–59)
HGB BLD-MCNC: 13 G/DL (ref 12–16)
IMM GRANULOCYTES # BLD AUTO: 0.01 X10(3) UL (ref 0–1)
IMM GRANULOCYTES NFR BLD: 0.2 %
LDLC SERPL CALC-MCNC: 150 MG/DL (ref ?–100)
LYMPHOCYTES # BLD AUTO: 2.28 X10(3) UL (ref 1–4)
LYMPHOCYTES NFR BLD AUTO: 40.2 %
M PROTEIN MFR SERPL ELPH: 7.9 G/DL (ref 6.4–8.2)
MCH RBC QN AUTO: 27.7 PG (ref 26–34)
MCHC RBC AUTO-ENTMCNC: 31.7 G/DL (ref 31–37)
MCV RBC AUTO: 87.4 FL (ref 80–100)
MONOCYTES # BLD AUTO: 0.6 X10(3) UL (ref 0.1–1)
MONOCYTES NFR BLD AUTO: 10.6 %
NEUTROPHILS # BLD AUTO: 2.52 X10 (3) UL (ref 1.5–7.7)
NEUTROPHILS # BLD AUTO: 2.52 X10(3) UL (ref 1.5–7.7)
NEUTROPHILS NFR BLD AUTO: 44.4 %
NONHDLC SERPL-MCNC: 162 MG/DL (ref ?–130)
OSMOLALITY SERPL CALC.SUM OF ELEC: 293 MOSM/KG (ref 275–295)
PATIENT FASTING Y/N/NP: YES
PATIENT FASTING Y/N/NP: YES
PLATELET # BLD AUTO: 238 10(3)UL (ref 150–450)
POTASSIUM SERPL-SCNC: 4.3 MMOL/L (ref 3.5–5.1)
RBC # BLD AUTO: 4.69 X10(6)UL (ref 3.8–5.3)
SODIUM SERPL-SCNC: 140 MMOL/L (ref 136–145)
TRIGL SERPL-MCNC: 60 MG/DL (ref 30–149)
TSI SER-ACNC: 1.99 MIU/ML (ref 0.36–3.74)
VLDLC SERPL CALC-MCNC: 12 MG/DL (ref 0–30)
WBC # BLD AUTO: 5.7 X10(3) UL (ref 4–11)

## 2020-07-20 PROCEDURE — 3008F BODY MASS INDEX DOCD: CPT | Performed by: INTERNAL MEDICINE

## 2020-07-20 PROCEDURE — 85025 COMPLETE CBC W/AUTO DIFF WBC: CPT

## 2020-07-20 PROCEDURE — 84443 ASSAY THYROID STIM HORMONE: CPT

## 2020-07-20 PROCEDURE — 80053 COMPREHEN METABOLIC PANEL: CPT

## 2020-07-20 PROCEDURE — 83036 HEMOGLOBIN GLYCOSYLATED A1C: CPT

## 2020-07-20 PROCEDURE — G0439 PPPS, SUBSEQ VISIT: HCPCS | Performed by: INTERNAL MEDICINE

## 2020-07-20 PROCEDURE — 86803 HEPATITIS C AB TEST: CPT

## 2020-07-20 PROCEDURE — 3078F DIAST BP <80 MM HG: CPT | Performed by: INTERNAL MEDICINE

## 2020-07-20 PROCEDURE — 96160 PT-FOCUSED HLTH RISK ASSMT: CPT | Performed by: INTERNAL MEDICINE

## 2020-07-20 PROCEDURE — 3074F SYST BP LT 130 MM HG: CPT | Performed by: INTERNAL MEDICINE

## 2020-07-20 PROCEDURE — 36415 COLL VENOUS BLD VENIPUNCTURE: CPT

## 2020-07-20 PROCEDURE — 99397 PER PM REEVAL EST PAT 65+ YR: CPT | Performed by: INTERNAL MEDICINE

## 2020-07-20 PROCEDURE — 80061 LIPID PANEL: CPT

## 2020-07-20 PROCEDURE — G0009 ADMIN PNEUMOCOCCAL VACCINE: HCPCS | Performed by: INTERNAL MEDICINE

## 2020-07-20 PROCEDURE — 90670 PCV13 VACCINE IM: CPT | Performed by: INTERNAL MEDICINE

## 2020-07-20 RX ORDER — ALLOPURINOL 100 MG/1
100 TABLET ORAL DAILY
Qty: 90 TABLET | Refills: 3 | Status: SHIPPED | OUTPATIENT
Start: 2020-07-20 | End: 2021-07-15

## 2020-07-20 RX ORDER — AMLODIPINE BESYLATE 5 MG/1
5 TABLET ORAL DAILY
Qty: 90 TABLET | Refills: 3 | Status: SHIPPED | OUTPATIENT
Start: 2020-07-20 | End: 2021-01-12

## 2020-07-20 RX ORDER — TRIAMTERENE AND HYDROCHLOROTHIAZIDE 37.5; 25 MG/1; MG/1
CAPSULE ORAL
Qty: 90 CAPSULE | Refills: 3 | Status: SHIPPED | OUTPATIENT
Start: 2020-07-20 | End: 2021-01-12

## 2020-07-20 RX ORDER — SERTRALINE HYDROCHLORIDE 25 MG/1
12.5 TABLET, FILM COATED ORAL EVERY OTHER DAY
Qty: 90 TABLET | Refills: 0 | Status: SHIPPED | OUTPATIENT
Start: 2020-07-20 | End: 2020-07-20

## 2020-07-20 RX ORDER — BENZONATATE 100 MG/1
100 CAPSULE ORAL 2 TIMES DAILY PRN
Qty: 10 CAPSULE | Refills: 0 | Status: SHIPPED | OUTPATIENT
Start: 2020-07-20 | End: 2021-01-04

## 2020-07-20 RX ORDER — PANTOPRAZOLE SODIUM 40 MG/1
40 TABLET, DELAYED RELEASE ORAL DAILY
Qty: 90 TABLET | Refills: 3 | Status: SHIPPED | OUTPATIENT
Start: 2020-07-20 | End: 2021-07-15

## 2020-07-20 RX ORDER — ATORVASTATIN CALCIUM 40 MG/1
TABLET, FILM COATED ORAL
Qty: 90 TABLET | Refills: 1 | Status: SHIPPED | OUTPATIENT
Start: 2020-07-20 | End: 2021-03-08

## 2020-07-20 RX ORDER — SERTRALINE HYDROCHLORIDE 25 MG/1
12.5 TABLET, FILM COATED ORAL EVERY OTHER DAY
Qty: 90 TABLET | Refills: 0 | COMMUNITY
Start: 2020-07-20 | End: 2021-04-15

## 2020-07-20 NOTE — PROGRESS NOTES
HPI:   Filbert Kehr is a 77year old female who presents for a MA (Medicare Advantage) Supervisit (Once per calendar year).     Patient comes for her very first Medicare advantage physical  No complaints and overall doing well except for her back pa standard forms performed Face to Face with patient and Family/surrogate (if present), and forms available to patient in AVS         She has never smoked tobacco.    CAGE Alcohol screening   Leonette Began was screened for Alcohol abuse and had a score DAILY  amLODIPine Besylate 5 MG Oral Tab, Take 1 tablet (5 mg total) by mouth daily. allopurinol 100 MG Oral Tab, Take 1 tablet (100 mg total) by mouth daily. Sertraline HCl 25 MG Oral Tab, Take 0.5 tablets (12.5 mg total) by mouth every other day.   salome (121.6 kg).     Medicare Hearing Assessment  (Required for AWV/SWV)    Hearing Screening    Time taken:  7/20/2020  3:13 PM  Screening Method:  Questionnaire  I have a problem hearing over the telephone:  No I have trouble following the conversations when t Pneumococcal (Prevnar 13) 07/20/2020        ASSESSMENT AND OTHER RELEVANT CHRONIC CONDITIONS:   Megan Hernandez is a 77year old female who presents for a Medicare Assessment.      PLAN SUMMARY:   Diagnoses and all orders for this visit:    Encounter for every other day. Stable  Gastroesophageal reflux disease without esophagitis  -     Pantoprazole Sodium 40 MG Oral Tab EC; Take 1 tablet (40 mg total) by mouth daily.   Stable on medications–refilled  Arthritis of low back (HCC)  Stable, advised to stretch External Lab or Procedure   Diabetes Screening      HbgA1C   Annually HgbA1C (%)   Date Value   06/14/2019 6.4 (H)    No flowsheet data found.     Fasting Blood Sugar (FSB)Annually Glucose (mg/dL)   Date Value   07/05/2019 107 (H)          Cardiovascular Chino Slater found Medium/high risk factors:   End-stage renal disease   Hemophiliacs who received Factor VIII or IX concentrates   Clients of institutions for the mentally retarded   Persons who live in the same house as a HepB virus carrier   Homosexual men   Illicit

## 2020-08-19 ENCOUNTER — PATIENT MESSAGE (OUTPATIENT)
Dept: INTERNAL MEDICINE CLINIC | Facility: CLINIC | Age: 75
End: 2020-08-19

## 2020-08-19 ENCOUNTER — NURSE TRIAGE (OUTPATIENT)
Dept: INTERNAL MEDICINE CLINIC | Facility: CLINIC | Age: 75
End: 2020-08-19

## 2020-08-19 NOTE — TELEPHONE ENCOUNTER
----- Message from Tameka Salcedo sent at 8/19/2020  2:11 PM CDT -----  Regarding: Non-Urgent Medical Question  Contact: [phone]  This is Tameka Salcedo. What can I take for dizziness and some lightheaded at time. [phone].       Thank You

## 2020-08-19 NOTE — TELEPHONE ENCOUNTER
From: Filbert Kehr  To: Mary Kearney MD  Sent: 8/19/2020 2:11 PM CDT  Subject: Non-Urgent Medical Question    This is Filbert Kehr. What can I take for dizziness and some lightheaded at time. [phone].  Thank You

## 2020-08-21 ENCOUNTER — TELEPHONE (OUTPATIENT)
Dept: INTERNAL MEDICINE CLINIC | Facility: CLINIC | Age: 75
End: 2020-08-21

## 2020-08-21 NOTE — TELEPHONE ENCOUNTER
Patient returning call. She states she cancelled yesterday's appointment because she is feeling better.

## 2020-10-22 NOTE — TELEPHONE ENCOUNTER
Please reference telephone encounter dated 02/24/20. Quality 137: Melanoma: Continuity Of Care - Recall System: Patient information entered into a recall system that includes: target date for the next exam specified AND a process to follow up with patients regarding missed or unscheduled appointments Detail Level: Detailed When Should The Patient Follow-Up For Their Next Full-Body Skin Exam?: 6 Months Detail Level: Simple

## 2020-11-16 ENCOUNTER — HOSPITAL ENCOUNTER (OUTPATIENT)
Dept: MAMMOGRAPHY | Facility: HOSPITAL | Age: 75
Discharge: HOME | End: 2020-11-16
Attending: INTERNAL MEDICINE
Payer: MEDICARE

## 2020-11-16 ENCOUNTER — E-VISIT (OUTPATIENT)
Dept: INTERNAL MEDICINE CLINIC | Facility: CLINIC | Age: 75
End: 2020-11-16

## 2020-11-16 DIAGNOSIS — Z87.39 HISTORY OF BACK PAIN: ICD-10-CM

## 2020-11-16 DIAGNOSIS — Z12.31 VISIT FOR SCREENING MAMMOGRAM: ICD-10-CM

## 2020-11-16 DIAGNOSIS — Z02.9 ENCOUNTERS FOR ADMINISTRATIVE PURPOSE: Primary | ICD-10-CM

## 2020-11-16 PROCEDURE — 77067 SCR MAMMO BI INCL CAD: CPT | Performed by: INTERNAL MEDICINE

## 2020-11-16 PROCEDURE — 77063 BREAST TOMOSYNTHESIS BI: CPT | Performed by: INTERNAL MEDICINE

## 2020-11-17 NOTE — PROGRESS NOTES
Filbert Kehr is a 77year old female. HPI:   See answers to questions above.      Current Outpatient Medications   Medication Sig Dispense Refill   • Triamterene-HCTZ 37.5-25 MG Oral Cap One tablet daily 90 capsule 3   • Pantoprazole Sodium 40 MG Ora

## 2021-01-04 ENCOUNTER — OFFICE VISIT (OUTPATIENT)
Dept: INTERNAL MEDICINE CLINIC | Facility: CLINIC | Age: 76
End: 2021-01-04
Payer: COMMERCIAL

## 2021-01-04 VITALS
DIASTOLIC BLOOD PRESSURE: 86 MMHG | WEIGHT: 271.81 LBS | SYSTOLIC BLOOD PRESSURE: 140 MMHG | HEART RATE: 98 BPM | BODY MASS INDEX: 43.68 KG/M2 | HEIGHT: 66 IN

## 2021-01-04 DIAGNOSIS — M17.0 PRIMARY OSTEOARTHRITIS OF BOTH KNEES: ICD-10-CM

## 2021-01-04 DIAGNOSIS — M47.819 ARTHRITIS OF LOW BACK: ICD-10-CM

## 2021-01-04 DIAGNOSIS — M54.50 ACUTE BILATERAL LOW BACK PAIN WITHOUT SCIATICA: Primary | ICD-10-CM

## 2021-01-04 DIAGNOSIS — M25.559 HIP PAIN: ICD-10-CM

## 2021-01-04 DIAGNOSIS — E66.01 OBESITY, MORBID, BMI 40.0-49.9 (HCC): ICD-10-CM

## 2021-01-04 PROCEDURE — 99214 OFFICE O/P EST MOD 30 MIN: CPT | Performed by: PHYSICIAN ASSISTANT

## 2021-01-04 PROCEDURE — 3077F SYST BP >= 140 MM HG: CPT | Performed by: PHYSICIAN ASSISTANT

## 2021-01-04 PROCEDURE — 3079F DIAST BP 80-89 MM HG: CPT | Performed by: PHYSICIAN ASSISTANT

## 2021-01-04 PROCEDURE — 3008F BODY MASS INDEX DOCD: CPT | Performed by: PHYSICIAN ASSISTANT

## 2021-01-04 RX ORDER — CYCLOBENZAPRINE HCL 10 MG
10 TABLET ORAL NIGHTLY
Qty: 15 TABLET | Refills: 0 | Status: SHIPPED | OUTPATIENT
Start: 2021-01-04 | End: 2021-03-17

## 2021-01-04 NOTE — PROGRESS NOTES
HPI:    Patient ID: Larry Andres is a 77year old female. HPI   Pt presents with low back pain for a month, notes after the holidays pain has worsened from standing and cooking all day.  She concurrently has BL knee pain from arthritis and l sided APPENDECTOMY     • BACK SURGERY     • OTHER SURGICAL HISTORY     \  Family History   Problem Relation Age of Onset   • Hypertension Father    • Kidney Disease Father    • Diabetes Neg    • Glaucoma Neg    • Macular degeneration Neg          PHYSICAL EXAM: 5. Hip pain  -start PT as advised   No orders of the defined types were placed in this encounter. No follow-ups on file.     Meds This Visit:  Requested Prescriptions     Signed Prescriptions Disp Refills   • cyclobenzaprine 10 MG Oral Tab 15 tablet 0

## 2021-01-12 DIAGNOSIS — I10 ESSENTIAL HYPERTENSION: ICD-10-CM

## 2021-01-12 RX ORDER — AMLODIPINE BESYLATE 5 MG/1
5 TABLET ORAL DAILY
Qty: 90 TABLET | Refills: 3 | Status: SHIPPED | OUTPATIENT
Start: 2021-01-12 | End: 2021-12-20

## 2021-01-12 RX ORDER — TRIAMTERENE AND HYDROCHLOROTHIAZIDE 37.5; 25 MG/1; MG/1
CAPSULE ORAL
Qty: 90 CAPSULE | Refills: 3 | Status: SHIPPED | OUTPATIENT
Start: 2021-01-12 | End: 2021-12-20

## 2021-01-12 NOTE — TELEPHONE ENCOUNTER
90 day supply      Current Outpatient Medications:   •  amLODIPine Besylate 5 MG Oral Tab, Take 1 tablet (5 mg total) by mouth daily. , Disp: 90 tablet, Rfl: 3

## 2021-01-20 ENCOUNTER — HOSPITAL ENCOUNTER (OUTPATIENT)
Dept: CT IMAGING | Facility: HOSPITAL | Age: 76
Discharge: HOME | End: 2021-01-20
Attending: PAIN MEDICINE
Payer: MEDICARE

## 2021-01-20 ENCOUNTER — TELEPHONE (OUTPATIENT)
Dept: INTERNAL MEDICINE CLINIC | Facility: CLINIC | Age: 76
End: 2021-01-20

## 2021-01-20 DIAGNOSIS — M51.36 LUMBAR DEGENERATIVE DISC DISEASE: ICD-10-CM

## 2021-01-20 RX ORDER — DIAZEPAM 2 MG/1
2 TABLET ORAL ONCE
Qty: 1 TABLET | Refills: 0 | Status: SHIPPED | OUTPATIENT
Start: 2021-01-20 | End: 2021-01-20

## 2021-01-20 NOTE — TELEPHONE ENCOUNTER
Who was the CT ordered by? When she ever on anything for anxiety–can try low-dose diazepam if someone is driving her  Was it because she was nervous or because of pain that she could not do the CT scan?

## 2021-01-20 NOTE — TELEPHONE ENCOUNTER
Pt informed of Dr Agusto Alaniz message below. Pt states could not complete the CT since \" I'm claustrophobic and have anxiety. \" States Dr Mitra Gonsalez (\"a back doctor\") ordered the spine CT (order visible in chart).  States has only taken sertraline (a

## 2021-01-20 NOTE — TELEPHONE ENCOUNTER
Patient states she was schedule for a MRI of the [facility] CT L-SPINE. She states she need a sedative to help calm her down. She could not complete her appointment today.

## 2021-01-25 ENCOUNTER — HOSPITAL ENCOUNTER (OUTPATIENT)
Dept: CT IMAGING | Facility: HOSPITAL | Age: 76
Discharge: HOME | End: 2021-01-25
Attending: PAIN MEDICINE
Payer: MEDICARE

## 2021-01-25 PROCEDURE — 72131 CT LUMBAR SPINE W/O DYE: CPT | Performed by: PAIN MEDICINE

## 2021-02-12 ENCOUNTER — PATIENT MESSAGE (OUTPATIENT)
Dept: INTERNAL MEDICINE CLINIC | Facility: CLINIC | Age: 76
End: 2021-02-12

## 2021-02-12 NOTE — TELEPHONE ENCOUNTER
From: Leopoldo Schmid  To: Fawn Coronel MD  Sent: 2/12/2021 10:56 AM CST  Subject: Non-Urgent Medical Question    This is Leopoldo Haileo. I was exposed to [place]. I take a test and was tested negative is it ok to get the vaccine. Thank you.  [phone]

## 2021-02-12 NOTE — TELEPHONE ENCOUNTER
To: Irma Holland      From: Mina Branham RN      Created: 2/12/2021 12:04 PM        Estela Delarosa,      First, were you and the other person not wearing masks, less than 6 feet apart for over 15 minutes?    If yes, and 5-7 days later (for the

## 2021-02-22 ENCOUNTER — TELEPHONE (OUTPATIENT)
Dept: CASE MANAGEMENT | Age: 76
End: 2021-02-22

## 2021-02-23 NOTE — TELEPHONE ENCOUNTER
Action Requested: Summary for Provider     []  Critical Lab, Recommendations Needed  [] Need Additional Advice  []   FYI    []   Need Orders  [] Need Medications Sent to Pharmacy  []  Other     SUMMARY:    Virtual appointment made for tomorrow 8/19/2020 fo Quality 110: Preventive Care And Screening: Influenza Immunization: Influenza Immunization Administered during Influenza season Detail Level: Detailed Quality 226: Preventive Care And Screening: Tobacco Use: Screening And Cessation Intervention: Patient screened for tobacco use and is an ex/non-smoker Quality 431: Preventive Care And Screening: Unhealthy Alcohol Use - Screening: Patient screened for unhealthy alcohol use using a single question and scores less than 2 times per year Quality 130: Documentation Of Current Medications In The Medical Record: Current Medications Documented

## 2021-03-03 DIAGNOSIS — Z23 NEED FOR VACCINATION: ICD-10-CM

## 2021-03-08 ENCOUNTER — PATIENT MESSAGE (OUTPATIENT)
Dept: INTERNAL MEDICINE CLINIC | Facility: CLINIC | Age: 76
End: 2021-03-08

## 2021-03-08 DIAGNOSIS — E78.2 MIXED HYPERLIPIDEMIA: ICD-10-CM

## 2021-03-08 RX ORDER — ATORVASTATIN CALCIUM 40 MG/1
TABLET, FILM COATED ORAL
Qty: 90 TABLET | Refills: 1 | Status: SHIPPED | OUTPATIENT
Start: 2021-03-08 | End: 2021-07-12

## 2021-03-08 NOTE — TELEPHONE ENCOUNTER
From: Apple Calhoun  To: Amirah Antonio MD  Sent: 3/8/2021 10:12 AM CST  Subject: Other    I need a refill for atorvastatin 40mg tablet From Apple Calhoun [phone].  Thank you

## 2021-03-09 ENCOUNTER — IMMUNIZATION (OUTPATIENT)
Dept: LAB | Facility: HOSPITAL | Age: 76
End: 2021-03-09
Attending: HOSPITALIST
Payer: MEDICARE

## 2021-03-09 DIAGNOSIS — Z23 NEED FOR VACCINATION: Primary | ICD-10-CM

## 2021-03-09 PROCEDURE — 0011A SARSCOV2 VAC 100MCG/0.5ML IM: CPT

## 2021-03-10 ENCOUNTER — OFFICE VISIT (OUTPATIENT)
Dept: NEUROLOGY | Facility: CLINIC | Age: 76
End: 2021-03-10
Payer: COMMERCIAL

## 2021-03-10 VITALS
DIASTOLIC BLOOD PRESSURE: 88 MMHG | OXYGEN SATURATION: 97 % | HEART RATE: 75 BPM | SYSTOLIC BLOOD PRESSURE: 130 MMHG | WEIGHT: 270 LBS | BODY MASS INDEX: 43.39 KG/M2 | HEIGHT: 66 IN

## 2021-03-10 DIAGNOSIS — K21.9 GASTROESOPHAGEAL REFLUX DISEASE WITHOUT ESOPHAGITIS: ICD-10-CM

## 2021-03-10 DIAGNOSIS — M17.0 PRIMARY OSTEOARTHRITIS OF BOTH KNEES: ICD-10-CM

## 2021-03-10 DIAGNOSIS — I70.0 ATHEROSCLEROSIS OF AORTA (HCC): ICD-10-CM

## 2021-03-10 DIAGNOSIS — E66.01 OBESITY, MORBID, BMI 40.0-49.9 (HCC): ICD-10-CM

## 2021-03-10 DIAGNOSIS — M48.062 SPINAL STENOSIS OF LUMBAR REGION WITH NEUROGENIC CLAUDICATION: Primary | ICD-10-CM

## 2021-03-10 PROCEDURE — 3079F DIAST BP 80-89 MM HG: CPT | Performed by: PHYSICAL MEDICINE & REHABILITATION

## 2021-03-10 PROCEDURE — 3075F SYST BP GE 130 - 139MM HG: CPT | Performed by: PHYSICAL MEDICINE & REHABILITATION

## 2021-03-10 PROCEDURE — 99204 OFFICE O/P NEW MOD 45 MIN: CPT | Performed by: PHYSICAL MEDICINE & REHABILITATION

## 2021-03-10 PROCEDURE — 3008F BODY MASS INDEX DOCD: CPT | Performed by: PHYSICAL MEDICINE & REHABILITATION

## 2021-03-10 RX ORDER — GABAPENTIN 300 MG/1
CAPSULE ORAL
Qty: 90 CAPSULE | Refills: 0 | Status: SHIPPED | OUTPATIENT
Start: 2021-03-10 | End: 2021-03-15

## 2021-03-11 ENCOUNTER — TELEPHONE (OUTPATIENT)
Dept: PHYSICAL THERAPY | Facility: HOSPITAL | Age: 76
End: 2021-03-11

## 2021-03-11 NOTE — PROGRESS NOTES
[address]  NEW PATIENT EVALUATION    Consultation as a request of Dr. Imtiaz Richardson    Chief Complaint: back pain.     HISTORY OF PRESENT ILLNESS:   Patient presents with:  Low Back Pain: New right handed patient times daily. 90 capsule 0   • atorvastatin 40 MG Oral Tab TAKE 1 TABLET BY MOUTH DAILY 90 tablet 1   • amLODIPine Besylate 5 MG Oral Tab Take 1 tablet (5 mg total) by mouth daily.  90 tablet 3   • Pantoprazole Sodium 40 MG Oral Tab EC Take 1 tablet (40 mg t denies  Swollen Joints: denies   Peripheral Vascular  Swelling of Legs/Feet: denies  Cold Extremities: denies   Skin  Open Sores: denies  Nodules or Lumps: denies  Rash: denies   Neurological  Loss of Strength Since last Visit: admits  Tingling/Numbness: a 07/20/2020    MCV 87.4 07/20/2020    MCH 27.7 07/20/2020    MCHC 31.7 07/20/2020    RDW 13.6 07/20/2020    .0 07/20/2020     Lab Results   Component Value Date     (H) 07/20/2020    BUN 19 (H) 07/20/2020    BUNCREA 16.1 07/20/2020    CREMARTY follow-up with me in 4 weeks. If she is no better we will discuss further imaging and possible epidural injection. The patient verbalized understanding with the plan and was in agreement.  All questions/concerns were addressed and there were no barriers

## 2021-03-12 ENCOUNTER — TELEPHONE (OUTPATIENT)
Dept: NEUROLOGY | Facility: CLINIC | Age: 76
End: 2021-03-12

## 2021-03-12 ENCOUNTER — APPOINTMENT (OUTPATIENT)
Dept: PHYSICAL THERAPY | Facility: HOSPITAL | Age: 76
End: 2021-03-12
Attending: PHYSICAL MEDICINE & REHABILITATION
Payer: MEDICARE

## 2021-03-12 ENCOUNTER — TELEPHONE (OUTPATIENT)
Dept: PHYSICAL THERAPY | Facility: HOSPITAL | Age: 76
End: 2021-03-12

## 2021-03-15 RX ORDER — GABAPENTIN 100 MG/1
100 CAPSULE ORAL 3 TIMES DAILY
Qty: 90 CAPSULE | Refills: 0 | Status: SHIPPED | OUTPATIENT
Start: 2021-03-15 | End: 2021-04-21

## 2021-03-15 NOTE — TELEPHONE ENCOUNTER
Patient states the gabapentin made her dizzy and nauseous. She felt like she was high. Patient stopped the medication last Thursday. Please advise.

## 2021-03-15 NOTE — TELEPHONE ENCOUNTER
Gabapentin 100 mg 3 times daily ordered for the pharmacy. Please advise patient to discontinue gabapentin 300 mg.

## 2021-03-16 ENCOUNTER — OFFICE VISIT (OUTPATIENT)
Dept: PHYSICAL THERAPY | Facility: HOSPITAL | Age: 76
End: 2021-03-16
Attending: INTERNAL MEDICINE
Payer: MEDICARE

## 2021-03-16 DIAGNOSIS — M48.062 SPINAL STENOSIS OF LUMBAR REGION WITH NEUROGENIC CLAUDICATION: ICD-10-CM

## 2021-03-16 PROCEDURE — 97110 THERAPEUTIC EXERCISES: CPT

## 2021-03-16 PROCEDURE — 97162 PT EVAL MOD COMPLEX 30 MIN: CPT

## 2021-03-16 NOTE — PROGRESS NOTES
SPINE EVALUATION:   Referring Physician: Dr. Yamilex Souza  Diagnosis: Spinal stenosis of lumbar region with neurogenic claudication (Y50.748) Date of Service: 3/16/2021     PATIENT SUMMARY   Briseida Yip is a 77year old female who presents to therapy toda L/S and hip. Stage of condition chronic. Irritability moderate. Pt and PT discussed evaluation findings, pathology, POC and HEP. Pt voiced understanding and performs HEP correctly without reported pain.  Skilled Physical Therapy is medically necessary to a response: decreased pain and improved mobility with walking post session.     Charges: PT Eval Moderate Complexity, EX 2      Total Timed Treatment: Eval 20 min, EX 25 min     Total Treatment Time: 45 min     Based on clinical rationale and outcome measures

## 2021-03-17 ENCOUNTER — OFFICE VISIT (OUTPATIENT)
Dept: INTERNAL MEDICINE CLINIC | Facility: CLINIC | Age: 76
End: 2021-03-17
Payer: COMMERCIAL

## 2021-03-17 ENCOUNTER — LAB ENCOUNTER (OUTPATIENT)
Dept: LAB | Age: 76
End: 2021-03-17
Attending: INTERNAL MEDICINE
Payer: MEDICARE

## 2021-03-17 VITALS
HEIGHT: 66 IN | HEART RATE: 68 BPM | SYSTOLIC BLOOD PRESSURE: 121 MMHG | BODY MASS INDEX: 43.87 KG/M2 | DIASTOLIC BLOOD PRESSURE: 77 MMHG | WEIGHT: 273 LBS

## 2021-03-17 DIAGNOSIS — E78.2 MIXED HYPERLIPIDEMIA: ICD-10-CM

## 2021-03-17 DIAGNOSIS — M17.0 PRIMARY OSTEOARTHRITIS OF BOTH KNEES: ICD-10-CM

## 2021-03-17 DIAGNOSIS — R73.03 BORDERLINE DIABETES: ICD-10-CM

## 2021-03-17 DIAGNOSIS — G91.2 (IDIOPATHIC) NORMAL PRESSURE HYDROCEPHALUS (HCC): ICD-10-CM

## 2021-03-17 DIAGNOSIS — F32.0 CURRENT MILD EPISODE OF MAJOR DEPRESSIVE DISORDER, UNSPECIFIED WHETHER RECURRENT (HCC): ICD-10-CM

## 2021-03-17 DIAGNOSIS — F41.9 ANXIETY: ICD-10-CM

## 2021-03-17 DIAGNOSIS — K21.9 GASTROESOPHAGEAL REFLUX DISEASE WITHOUT ESOPHAGITIS: ICD-10-CM

## 2021-03-17 DIAGNOSIS — I70.0 ATHEROSCLEROSIS OF AORTA (HCC): ICD-10-CM

## 2021-03-17 DIAGNOSIS — Z12.11 ENCOUNTER FOR SCREENING COLONOSCOPY: ICD-10-CM

## 2021-03-17 DIAGNOSIS — M47.819 ARTHRITIS OF LOW BACK: ICD-10-CM

## 2021-03-17 DIAGNOSIS — H02.886 MEIBOMIAN GLAND DYSFUNCTION (MGD) OF BOTH EYES: ICD-10-CM

## 2021-03-17 DIAGNOSIS — M10.9 GOUT, UNSPECIFIED CAUSE, UNSPECIFIED CHRONICITY, UNSPECIFIED SITE: ICD-10-CM

## 2021-03-17 DIAGNOSIS — N18.30 STAGE 3 CHRONIC KIDNEY DISEASE, UNSPECIFIED WHETHER STAGE 3A OR 3B CKD (HCC): ICD-10-CM

## 2021-03-17 DIAGNOSIS — G89.29 CHRONIC MIDLINE LOW BACK PAIN WITHOUT SCIATICA: ICD-10-CM

## 2021-03-17 DIAGNOSIS — F32.4 MAJOR DEPRESSIVE DISORDER WITH SINGLE EPISODE, IN PARTIAL REMISSION (HCC): Chronic | ICD-10-CM

## 2021-03-17 DIAGNOSIS — E79.0 ELEVATED BLOOD URIC ACID LEVEL: ICD-10-CM

## 2021-03-17 DIAGNOSIS — I10 ESSENTIAL HYPERTENSION: ICD-10-CM

## 2021-03-17 DIAGNOSIS — H02.883 MEIBOMIAN GLAND DYSFUNCTION (MGD) OF BOTH EYES: ICD-10-CM

## 2021-03-17 DIAGNOSIS — G56.03 BILATERAL CARPAL TUNNEL SYNDROME: ICD-10-CM

## 2021-03-17 DIAGNOSIS — Z00.00 ENCOUNTER FOR ANNUAL HEALTH EXAMINATION: Primary | ICD-10-CM

## 2021-03-17 DIAGNOSIS — E66.01 OBESITY, MORBID, BMI 40.0-49.9 (HCC): ICD-10-CM

## 2021-03-17 DIAGNOSIS — M54.50 CHRONIC MIDLINE LOW BACK PAIN WITHOUT SCIATICA: ICD-10-CM

## 2021-03-17 PROBLEM — F32.9 MAJOR DEPRESSIVE DISORDER: Status: RESOLVED | Noted: 2019-05-22 | Resolved: 2021-03-17

## 2021-03-17 LAB
EST. AVERAGE GLUCOSE BLD GHB EST-MCNC: 134 MG/DL (ref 68–126)
HBA1C MFR BLD HPLC: 6.3 % (ref ?–5.7)

## 2021-03-17 PROCEDURE — 3074F SYST BP LT 130 MM HG: CPT | Performed by: INTERNAL MEDICINE

## 2021-03-17 PROCEDURE — 3078F DIAST BP <80 MM HG: CPT | Performed by: INTERNAL MEDICINE

## 2021-03-17 PROCEDURE — 83036 HEMOGLOBIN GLYCOSYLATED A1C: CPT

## 2021-03-17 PROCEDURE — 99397 PER PM REEVAL EST PAT 65+ YR: CPT | Performed by: INTERNAL MEDICINE

## 2021-03-17 PROCEDURE — G0439 PPPS, SUBSEQ VISIT: HCPCS | Performed by: INTERNAL MEDICINE

## 2021-03-17 PROCEDURE — 36415 COLL VENOUS BLD VENIPUNCTURE: CPT

## 2021-03-17 PROCEDURE — 96160 PT-FOCUSED HLTH RISK ASSMT: CPT | Performed by: INTERNAL MEDICINE

## 2021-03-17 PROCEDURE — 3008F BODY MASS INDEX DOCD: CPT | Performed by: INTERNAL MEDICINE

## 2021-03-17 NOTE — PATIENT INSTRUCTIONS
Krishna Matamoros's SCREENING SCHEDULE   Tests on this list are recommended by your physician but may not be covered, or covered at this frequency, by your insurer. Please check with your insurance carrier before scheduling to verify coverage.    PREVENT every 10 years- more often if abnormal Colonoscopy Never done Update Health Maintenance if applicable    Flex Sigmoidoscopy Screen  Covered every 5 years No results found for this or any previous visit. No flowsheet data found.      Fecal Occult Blood   Cov after your 65th birthday    Pneumococcal 23 (Pneumovax)  Covered Once after 65 No orders found for this or any previous visit.  Please get once after your 65th birthday    Hepatitis B for Moderate/High Risk       No orders found for this or any previous vis

## 2021-03-17 NOTE — PROGRESS NOTES
HPI:   Leopoldo Schmid is a 77year old female who presents for a MA (Medicare Advantage) Supervisit (Once per calendar year).     Patient comes for her Medicare physical-overall doing well but does still have her knee and back pain she is seeing Dr. Cindy Cole Therapy)    Patient Active Problem List:     Primary osteoarthritis of both knees     Mixed hyperlipidemia     Essential hypertension     Meibomian gland dysfunction (MGD) of both eyes     Bilateral carpal tunnel syndrome     Gout     Elevated blood uric a total) by mouth daily. Sertraline HCl 25 MG Oral Tab, Take 0.5 tablets (12.5 mg total) by mouth every other day. acetaminophen 500 MG Oral Tab, Take 1 tablet (500 mg total) by mouth every 4 (four) hours as needed for Pain or Fever.        MEDICAL INFORMAT understanding things on the TV: No I have to strain to understand conversations: No   I have to worry about missing the telephone ring or doorbell: No I have trouble hearing conversations in a noisy background such as a crowded room or restaurant: No   I g examination  Advised patient to watch what she eats and exercise, seatbelt use and no texting and driving, sunscreen use advised  Encounter for screening colonoscopy  -     GASTRO - INTERNAL  -     OCCULT BLOOD, FECAL, IMMUNOASSAY;  Future  Patient wants to difficult with her with her knee pain and back pain     Preventative medicine  Labs 7/2020 reviewed   DEXA scan in 2016 in care everywhere was normal, reordered   cscope -will refer but will do fit test   Mammogram 11/2020          Diet assessment: fair Screening      Ophthalmology Visit Annually: Diabetics, FHx Glaucoma, AA>50, > 65 No flowsheet data found. Bone Density Screening      Dexascan Every two years No results found for this or any previous visit. No flowsheet data found.     Pap an diuretics, anticonvulsants.)    Potassium  Annually Potassium (mmol/L)   Date Value   07/20/2020 4.3    No flowsheet data found. Creatinine  Annually Creatinine (mg/dL)   Date Value   07/20/2020 1.18 (H)    No flowsheet data found.     Drug Serum Conc  A

## 2021-03-18 ENCOUNTER — OFFICE VISIT (OUTPATIENT)
Dept: PHYSICAL THERAPY | Facility: HOSPITAL | Age: 76
End: 2021-03-18
Attending: PHYSICAL MEDICINE & REHABILITATION
Payer: MEDICARE

## 2021-03-18 ENCOUNTER — TELEPHONE (OUTPATIENT)
Dept: INTERNAL MEDICINE CLINIC | Facility: CLINIC | Age: 76
End: 2021-03-18

## 2021-03-18 PROCEDURE — 97110 THERAPEUTIC EXERCISES: CPT

## 2021-03-18 PROCEDURE — 97140 MANUAL THERAPY 1/> REGIONS: CPT

## 2021-03-18 NOTE — PROGRESS NOTES
Dx: Spinal stenosis of lumbar region with neurogenic claudication (S23.231)         Insurance (Authorized # of Visits):  10 (Eyal Nicholas)           Authorizing Physician: Dr. Viktoria Melvin  Next MD visit: 4/7/2021  Precautions: severe R knee OA and marching 2x10, slightly reclined  -seated hip hinging with cane x10  -amb with RW 2x60 ft SBA              HEP - added seated clams and seated marching         Charges: MT, EX 2       Total Timed Treatment: MT 15 min, EX 25 min  Total Treatment Time: 43 mi

## 2021-03-18 NOTE — TELEPHONE ENCOUNTER
Spoke with patient ( verified)--reports her back doctor, Dr. Jairo Maciel prescribed her Gabapentin 100 mg TID, #90 on 3/15/2021. \"I read this medication does something to your kidneys.  I want to check with Dr. Klever Malloy to make sure it's safe for me to t

## 2021-03-19 ENCOUNTER — TELEPHONE (OUTPATIENT)
Dept: NEUROLOGY | Facility: CLINIC | Age: 76
End: 2021-03-19

## 2021-03-19 NOTE — TELEPHONE ENCOUNTER
Patient is still having a lot of knee pain. She is still taking the (gabapentin 100 MG Oral Cap) however still in pain. Can the doctor increase the dosage or add a medication for the pain?

## 2021-03-19 NOTE — TELEPHONE ENCOUNTER
S/w patient she stating the Gabapentin 100mg is not helping the pain at all, she wants to take it 2x a day. Previous note stating she was on Gabapentin 300mg, admitted to feeling high, nauseous, and dizzy on this dose.      Pt is requesting something for k

## 2021-03-22 NOTE — TELEPHONE ENCOUNTER
S/w patient taking Tylenol for pain with some relief. Let her know if pain worsens to make a follow up appointment to be reassessed.

## 2021-03-25 ENCOUNTER — OFFICE VISIT (OUTPATIENT)
Dept: PHYSICAL THERAPY | Facility: HOSPITAL | Age: 76
End: 2021-03-25
Attending: PHYSICAL MEDICINE & REHABILITATION
Payer: MEDICARE

## 2021-03-25 ENCOUNTER — LAB ENCOUNTER (OUTPATIENT)
Dept: LAB | Age: 76
End: 2021-03-25
Attending: INTERNAL MEDICINE
Payer: MEDICARE

## 2021-03-25 DIAGNOSIS — Z12.11 ENCOUNTER FOR SCREENING COLONOSCOPY: ICD-10-CM

## 2021-03-25 LAB — HEMOCCULT STL QL: NEGATIVE

## 2021-03-25 PROCEDURE — 97140 MANUAL THERAPY 1/> REGIONS: CPT

## 2021-03-25 PROCEDURE — 82274 ASSAY TEST FOR BLOOD FECAL: CPT

## 2021-03-25 PROCEDURE — 97110 THERAPEUTIC EXERCISES: CPT

## 2021-03-25 NOTE — PROGRESS NOTES
Dx: Spinal stenosis of lumbar region with neurogenic claudication (T98.045)         Insurance (Authorized # of Visits):  10 (Claudell Daughters)           Authorizing Physician: Dr. Brayden Diaz MD visit: 4/7/2021  Precautions: severe R knee OA and mobs inferior and lateral patellar glides gr 3      EX  -Nu Step Seat 10 arms 10 x5 min  -Supine hip flexor stretch x1 min bilat  -bolster bridging 2x8 hold 3 sec  -Seated clams Blue TB 2x10 hold 5 sec  -seated marching 2x10, slightly reclined  -seated hip

## 2021-03-28 ENCOUNTER — PATIENT MESSAGE (OUTPATIENT)
Dept: INTERNAL MEDICINE CLINIC | Facility: CLINIC | Age: 76
End: 2021-03-28

## 2021-03-29 NOTE — TELEPHONE ENCOUNTER
From: Lainey Marquez  To: Myra Wiggins MD  Sent: 3/28/2021 12:00 PM CDT  Subject: Non-Urgent Medical Question    This is Lainey Marquez.  I would to know can I take Janet gummies with the medications I take

## 2021-03-30 ENCOUNTER — OFFICE VISIT (OUTPATIENT)
Dept: PHYSICAL THERAPY | Facility: HOSPITAL | Age: 76
End: 2021-03-30
Attending: PHYSICAL MEDICINE & REHABILITATION
Payer: MEDICARE

## 2021-03-30 PROCEDURE — 97140 MANUAL THERAPY 1/> REGIONS: CPT

## 2021-03-30 PROCEDURE — 97110 THERAPEUTIC EXERCISES: CPT

## 2021-03-30 NOTE — PROGRESS NOTES
Dx: Spinal stenosis of lumbar region with neurogenic claudication (V71.837)         Insurance (Authorized # of Visits):  10 (Andra Lowe)           Authorizing Physician: Dr. Reji Diaz MD visit: 4/7/2021  Precautions: severe R knee OA and glides gr 3 MT  -hip harmonics 2-way bilat  -R fibular head AP/PA mobs gr 3  -Patellar mobs inferior and lateral patellar glides gr 3     EX  -Nu Step Seat 10 arms 10 x5 min  -Supine hip flexor stretch x1 min bilat  -bolster bridging 2x8 hold 3 sec  -Seate

## 2021-03-31 ENCOUNTER — PATIENT MESSAGE (OUTPATIENT)
Dept: INTERNAL MEDICINE CLINIC | Facility: CLINIC | Age: 76
End: 2021-03-31

## 2021-03-31 NOTE — TELEPHONE ENCOUNTER
From: Fernandez Began  To: Katarina Murphy MD  Sent: 3/31/2021 11:27 AM CDT  Subject: Other    This is Fernandez Whittaker is ok to take hal apple cider vinegar gummies. Date of birth 02/20/1945. [phone].  Thank you

## 2021-04-01 ENCOUNTER — APPOINTMENT (OUTPATIENT)
Dept: PHYSICAL THERAPY | Facility: HOSPITAL | Age: 76
End: 2021-04-01
Attending: PHYSICAL MEDICINE & REHABILITATION
Payer: MEDICARE

## 2021-04-06 ENCOUNTER — IMMUNIZATION (OUTPATIENT)
Dept: LAB | Facility: HOSPITAL | Age: 76
End: 2021-04-06
Attending: EMERGENCY MEDICINE
Payer: MEDICARE

## 2021-04-06 DIAGNOSIS — Z23 NEED FOR VACCINATION: Primary | ICD-10-CM

## 2021-04-06 PROCEDURE — 0012A SARSCOV2 VAC 100MCG/0.5ML IM: CPT

## 2021-04-13 ENCOUNTER — OFFICE VISIT (OUTPATIENT)
Dept: PHYSICAL THERAPY | Facility: HOSPITAL | Age: 76
End: 2021-04-13
Attending: PHYSICAL MEDICINE & REHABILITATION
Payer: MEDICARE

## 2021-04-13 PROCEDURE — 97110 THERAPEUTIC EXERCISES: CPT

## 2021-04-13 PROCEDURE — 97140 MANUAL THERAPY 1/> REGIONS: CPT

## 2021-04-13 NOTE — PROGRESS NOTES
Dx: Spinal stenosis of lumbar region with neurogenic claudication (A73.278)         Insurance (Authorized # of Visits):  10 (Beryle Chuck)           Authorizing Physician: Dr. Cherylene League Next MD visit: 4/7/2021  Precautions: severe R knee OA and patellar glides gr 3 MT  -hip harmonics 2-way bilat  -R fibular head AP/PA mobs gr 3  -Patellar mobs inferior and lateral patellar glides gr 3    EX  -Nu Step Seat 10 arms 10 x5 min  -Supine hip flexor stretch x1 min bilat  -bolster bridging 2x8 hold 3 sec

## 2021-04-15 ENCOUNTER — OFFICE VISIT (OUTPATIENT)
Dept: PHYSICAL THERAPY | Facility: HOSPITAL | Age: 76
End: 2021-04-15
Attending: PHYSICAL MEDICINE & REHABILITATION
Payer: MEDICARE

## 2021-04-15 DIAGNOSIS — F32.0 CURRENT MILD EPISODE OF MAJOR DEPRESSIVE DISORDER, UNSPECIFIED WHETHER RECURRENT (HCC): ICD-10-CM

## 2021-04-15 PROCEDURE — 97110 THERAPEUTIC EXERCISES: CPT

## 2021-04-15 PROCEDURE — 97140 MANUAL THERAPY 1/> REGIONS: CPT

## 2021-04-15 RX ORDER — SERTRALINE HYDROCHLORIDE 25 MG/1
TABLET, FILM COATED ORAL
Qty: 90 TABLET | Refills: 0 | Status: SHIPPED | OUTPATIENT
Start: 2021-04-15 | End: 2021-09-08

## 2021-04-19 ENCOUNTER — OFFICE VISIT (OUTPATIENT)
Dept: PHYSICAL THERAPY | Facility: HOSPITAL | Age: 76
End: 2021-04-19
Attending: PHYSICAL MEDICINE & REHABILITATION
Payer: MEDICARE

## 2021-04-19 PROCEDURE — 97140 MANUAL THERAPY 1/> REGIONS: CPT

## 2021-04-19 PROCEDURE — 97110 THERAPEUTIC EXERCISES: CPT

## 2021-04-19 NOTE — PROGRESS NOTES
Progress Summary  Pt has attended 6 visits in Physical Therapy  Dx: Spinal stenosis of lumbar region with neurogenic claudication (A61.466)         Insurance (Authorized # of Visits):  10 (Jennifer Greco)           Authorizing Physician: Dr. Jez Solis with max 3/10 pain.  (ALMOST MET)  At up to 15 min walking to be able to go grocery shopping without difficulty max 3/10 pain. (MET, elevated pain using shopping cart)    Plan: Continue skilled Physical Therapy 1-2 x/week or a total of 2 visits over a 90 da

## 2021-04-19 NOTE — PROGRESS NOTES
Dx: Spinal stenosis of lumbar region with neurogenic claudication (Z27.897)         Insurance (Authorized # of Visits):  10 ([facility])           Authorizing Physician: Dr. Guerline Diaz MD visit: 4/7/2021  Precautions: severe R knee OA and patellar glides gr 3 MT  -hip harmonics 2-way bilat  -R fibular head AP/PA mobs gr 3  -Patellar mobs inferior and lateral patellar glides gr 3    EX  -Nu Step Seat 10 arms 10 x5 min  -Supine hip flexor stretch x1 min bilat  -bolster bridging 2x8 hold 3 sec

## 2021-04-21 ENCOUNTER — OFFICE VISIT (OUTPATIENT)
Dept: NEUROLOGY | Facility: CLINIC | Age: 76
End: 2021-04-21
Payer: COMMERCIAL

## 2021-04-21 ENCOUNTER — TELEPHONE (OUTPATIENT)
Dept: PHYSICAL THERAPY | Facility: HOSPITAL | Age: 76
End: 2021-04-21

## 2021-04-21 VITALS — WEIGHT: 271 LBS | BODY MASS INDEX: 43.55 KG/M2 | HEART RATE: 87 BPM | OXYGEN SATURATION: 94 % | HEIGHT: 66 IN

## 2021-04-21 DIAGNOSIS — I70.0 ATHEROSCLEROSIS OF AORTA (HCC): ICD-10-CM

## 2021-04-21 DIAGNOSIS — M17.0 PRIMARY OSTEOARTHRITIS OF BOTH KNEES: ICD-10-CM

## 2021-04-21 DIAGNOSIS — E66.01 OBESITY, MORBID, BMI 40.0-49.9 (HCC): ICD-10-CM

## 2021-04-21 DIAGNOSIS — M48.062 SPINAL STENOSIS OF LUMBAR REGION WITH NEUROGENIC CLAUDICATION: ICD-10-CM

## 2021-04-21 DIAGNOSIS — K21.9 GASTROESOPHAGEAL REFLUX DISEASE WITHOUT ESOPHAGITIS: ICD-10-CM

## 2021-04-21 DIAGNOSIS — M47.816 FACET ARTHROPATHY, LUMBAR: Primary | ICD-10-CM

## 2021-04-21 PROCEDURE — 99213 OFFICE O/P EST LOW 20 MIN: CPT | Performed by: PHYSICAL MEDICINE & REHABILITATION

## 2021-04-21 PROCEDURE — 3008F BODY MASS INDEX DOCD: CPT | Performed by: PHYSICAL MEDICINE & REHABILITATION

## 2021-04-21 RX ORDER — GABAPENTIN 100 MG/1
100 CAPSULE ORAL 3 TIMES DAILY
Qty: 90 CAPSULE | Refills: 0 | Status: SHIPPED | OUTPATIENT
Start: 2021-04-21 | End: 2021-05-17

## 2021-04-21 NOTE — PROGRESS NOTES
[address] [facility]  FOLLOW UP EVALUATION      Chief Complaint: back pain.     HISTORY OF PRESENT ILLNESS:   Patient presents with:  Low Back Pain: LOV: 3/10/21 Pt f/u on localized bilateral low back pain denies N/T. improved.       PAST MEDICAL HISTORY:     Past Medical History:   Diagnosis Date   • Anxiety    • Essential hypertension    • Hyperlipidemia          PAST SURGICAL HISTORY:     Past Surgical History:   Procedure Laterality Date   • APPENDECTOMY     • BACK S admits  Painful Joints: admits   Neurological  Loss of Strength Since last Visit: admits  Tingling/Numbness: denies         PHYSICAL EXAM:   Pulse 87   Ht 66\"   Wt 271 lb (122.9 kg)   SpO2 94%   BMI 43.74 kg/m²   General: No immediate distress  Head: Norm 07/20/2020    BUNCREA 16.1 07/20/2020    CREATSERUM 1.18 (H) 07/20/2020    ANIONGAP 2 07/20/2020    GFRNAA 46 (L) 07/20/2020    GFRAA 53 (L) 07/20/2020    CA 9.4 07/20/2020    OSMOCALC 293 07/20/2020    ALKPHO 123 07/20/2020    AST 21 07/20/2020    ALT 30 verbalized understanding with the plan and was in agreement. All questions/concerns were addressed and there were no barriers to learning. Jazmín COLES. [id number] [facility]  Physical Medicine and Rehabilitation/Sports Medicine

## 2021-04-22 ENCOUNTER — APPOINTMENT (OUTPATIENT)
Dept: PHYSICAL THERAPY | Facility: HOSPITAL | Age: 76
End: 2021-04-22
Attending: PHYSICAL MEDICINE & REHABILITATION
Payer: MEDICARE

## 2021-04-26 ENCOUNTER — OFFICE VISIT (OUTPATIENT)
Dept: PHYSICAL THERAPY | Facility: HOSPITAL | Age: 76
End: 2021-04-26
Attending: PHYSICAL MEDICINE & REHABILITATION
Payer: MEDICARE

## 2021-04-26 PROCEDURE — 97110 THERAPEUTIC EXERCISES: CPT

## 2021-04-26 PROCEDURE — 97140 MANUAL THERAPY 1/> REGIONS: CPT

## 2021-04-26 NOTE — PROGRESS NOTES
Dx: Spinal stenosis of lumbar region with neurogenic claudication (J41.089)         Insurance (Authorized # of Visits):  10 ([facility])           Authorizing Physician: Dr. Guerline Diaz MD visit: 4/7/2021  Precautions: severe R knee OA and sec  -Shuttle knee unloading 30 lbs, 3x15  -Standing marching 3x5 bilat  -Side stepping holding onto bars 5 steps out 5 steps in 2x3   -Test and measures EX  -Nu Step Seat 10 arms 10 x8 min R 5  -R SAQ for VMO activation x10, 2x10 with 2.5 lb ankle wt.    -

## 2021-04-28 ENCOUNTER — TELEPHONE (OUTPATIENT)
Dept: NEUROLOGY | Facility: CLINIC | Age: 76
End: 2021-04-28

## 2021-04-28 DIAGNOSIS — M47.816 FACET ARTHROPATHY, LUMBAR: Primary | ICD-10-CM

## 2021-04-29 ENCOUNTER — OFFICE VISIT (OUTPATIENT)
Dept: PHYSICAL THERAPY | Facility: HOSPITAL | Age: 76
End: 2021-04-29
Attending: PHYSICAL MEDICINE & REHABILITATION
Payer: MEDICARE

## 2021-04-29 PROCEDURE — 97140 MANUAL THERAPY 1/> REGIONS: CPT

## 2021-04-29 PROCEDURE — 97110 THERAPEUTIC EXERCISES: CPT

## 2021-04-29 NOTE — PROGRESS NOTES
Dx: Spinal stenosis of lumbar region with neurogenic claudication (M00.563)         Insurance (Authorized # of Visits):  10 ([employer])           Authorizing Physician: Dr. Fe Diaz MD visit: 4/7/2021  Precautions: severe R knee OA and min R 5  -R SAQ for VMO activation x10, 2x10 with 2.5 lb ankle wt. -S/L R clam x10 hold 5 sec  -Supine heel slides for iliopsoas training 2 sets to fatigue  -Seated glut med isometrics 4x30 sec 1 min rest in between.   EX  -Nu Step Seat 10 arms 10 x5 min

## 2021-04-30 ENCOUNTER — TELEPHONE (OUTPATIENT)
Dept: NEUROLOGY | Facility: CLINIC | Age: 76
End: 2021-04-30

## 2021-04-30 DIAGNOSIS — M47.816 FACET ARTHROPATHY, LUMBAR: Primary | ICD-10-CM

## 2021-04-30 NOTE — TELEPHONE ENCOUNTER
Bilateral lower lumbar facet joint injections ordered. Please schedule patient at [facility] once approved.     Semaj Jc DO, FAAPMR & CAQSM  Physical Medicine and Rehabilitation/Sports Medicine  MEDICAL CENTER [facility]

## 2021-04-30 NOTE — TELEPHONE ENCOUNTER
Patient has been scheduled for a Bilateral L3-4, L4-5 and L5-S1 Facet joint injection  on 5/24/21 at the [address] Medications and allergies reviewed. Patient informed she will need a .  Patient informed not to eat or drink anything after midnight the nig

## 2021-04-30 NOTE — TELEPHONE ENCOUNTER
Contacted [facility] online Case/Reference # [id number] to initiate authorization for Bilateral L3-4, L4-5 and L5-S1 Facet joint injection CPT [id number], [id number], [id number] dx:M47.816 to be done at [facility].     Status: Approved-Notification or Prior Authorization is n

## 2021-05-17 RX ORDER — GABAPENTIN 100 MG/1
CAPSULE ORAL
Qty: 90 CAPSULE | Refills: 0 | Status: SHIPPED | OUTPATIENT
Start: 2021-05-17 | End: 2021-06-14

## 2021-05-17 NOTE — TELEPHONE ENCOUNTER
Medication request: Gabapentin 100mg Take 1 capsule (100 mg total) by mouth 3 (three) times daily.     LOV: 04/21/21  NOV: 05/24/21    [place] 04/21/21 #90 r-0

## 2021-05-21 ENCOUNTER — LAB REQUISITION (OUTPATIENT)
Dept: LAB | Facility: HOSPITAL | Age: 76
End: 2021-05-21
Payer: MEDICARE

## 2021-05-21 DIAGNOSIS — Z01.818 ENCOUNTER FOR OTHER PREPROCEDURAL EXAMINATION: ICD-10-CM

## 2021-05-24 ENCOUNTER — TELEPHONE (OUTPATIENT)
Dept: NEUROLOGY | Facility: CLINIC | Age: 76
End: 2021-05-24

## 2021-05-24 ENCOUNTER — OFFICE VISIT (OUTPATIENT)
Dept: SURGERY | Facility: CLINIC | Age: 76
End: 2021-05-24

## 2021-05-24 DIAGNOSIS — M47.816 FACET ARTHROPATHY, LUMBAR: Primary | ICD-10-CM

## 2021-05-24 PROCEDURE — 64494 INJ PARAVERT F JNT L/S 2 LEV: CPT | Performed by: PHYSICAL MEDICINE & REHABILITATION

## 2021-05-24 PROCEDURE — 64495 INJ PARAVERT F JNT L/S 3 LEV: CPT | Performed by: PHYSICAL MEDICINE & REHABILITATION

## 2021-05-24 PROCEDURE — 64493 INJ PARAVERT F JNT L/S 1 LEV: CPT | Performed by: PHYSICAL MEDICINE & REHABILITATION

## 2021-05-24 NOTE — PROGRESS NOTES
[facility] Z-JOINT/FACET INJECTIONS  NAME:  Rafa Thibodeaux    MR #:    [id number] :  1945     PHYSICIAN:  Sendy Wu        Operative Report    DATE OF PROCEDURE: 2021   PREOPERATIVE DIAGNOSES: 1.  Face whole procedure, the patient's pulse oximetry and vital signs were monitored and they remained completely stable. Also, throughout the whole procedure, prior to injection of any medication, aspiration was performed.   No blood, fluid, or air was aspirated

## 2021-05-26 ENCOUNTER — TELEPHONE (OUTPATIENT)
Dept: NEUROLOGY | Facility: CLINIC | Age: 76
End: 2021-05-26

## 2021-05-26 NOTE — TELEPHONE ENCOUNTER
Patient has injection this past Monday 5/24/21 still having pain would like to take OTC, looking for guidance- Please advise

## 2021-05-26 NOTE — TELEPHONE ENCOUNTER
S/w patient in regards to taking OTC ibuprofen. Verbalized it was okay and informed her patient the injection takes 2-3 weeks for the full effect.

## 2021-06-14 RX ORDER — GABAPENTIN 100 MG/1
CAPSULE ORAL
Qty: 90 CAPSULE | Refills: 0 | Status: SHIPPED | OUTPATIENT
Start: 2021-06-14 | End: 2021-07-12

## 2021-06-14 NOTE — TELEPHONE ENCOUNTER
Medication request: Gabapentin 100mg TAKE 1 CAPSULE(100 MG) BY MOUTH THREE TIMES DAILY    LOV: 05/24/21  NOV: 06/21/21    [place] 05/17/21 #90 r-0

## 2021-06-21 ENCOUNTER — OFFICE VISIT (OUTPATIENT)
Dept: NEUROLOGY | Facility: CLINIC | Age: 76
End: 2021-06-21
Payer: COMMERCIAL

## 2021-06-21 VITALS
DIASTOLIC BLOOD PRESSURE: 70 MMHG | WEIGHT: 271 LBS | SYSTOLIC BLOOD PRESSURE: 140 MMHG | BODY MASS INDEX: 43.55 KG/M2 | HEIGHT: 66 IN

## 2021-06-21 DIAGNOSIS — M16.11 PRIMARY OSTEOARTHRITIS OF RIGHT HIP: Primary | ICD-10-CM

## 2021-06-21 DIAGNOSIS — I70.0 ATHEROSCLEROSIS OF AORTA (HCC): ICD-10-CM

## 2021-06-21 DIAGNOSIS — M17.11 PRIMARY OSTEOARTHRITIS OF RIGHT KNEE: ICD-10-CM

## 2021-06-21 DIAGNOSIS — E66.01 OBESITY, MORBID, BMI 40.0-49.9 (HCC): ICD-10-CM

## 2021-06-21 DIAGNOSIS — M48.062 SPINAL STENOSIS OF LUMBAR REGION WITH NEUROGENIC CLAUDICATION: ICD-10-CM

## 2021-06-21 DIAGNOSIS — M47.816 FACET ARTHROPATHY, LUMBAR: ICD-10-CM

## 2021-06-21 DIAGNOSIS — K21.9 GASTROESOPHAGEAL REFLUX DISEASE WITHOUT ESOPHAGITIS: ICD-10-CM

## 2021-06-21 PROCEDURE — 3008F BODY MASS INDEX DOCD: CPT | Performed by: PHYSICAL MEDICINE & REHABILITATION

## 2021-06-21 PROCEDURE — 3077F SYST BP >= 140 MM HG: CPT | Performed by: PHYSICAL MEDICINE & REHABILITATION

## 2021-06-21 PROCEDURE — 3078F DIAST BP <80 MM HG: CPT | Performed by: PHYSICAL MEDICINE & REHABILITATION

## 2021-06-21 PROCEDURE — 99214 OFFICE O/P EST MOD 30 MIN: CPT | Performed by: PHYSICAL MEDICINE & REHABILITATION

## 2021-06-21 NOTE — PROGRESS NOTES
[address]  FOLLOW UP EVALUATION      Chief Complaint: back pain. HISTORY OF PRESENT ILLNESS:   Patient presents with:  Low Back Pain: pt is here for f/u low back pain.   LOV 5/24/21  pt states is 80% impr She denies any changes in strength sensation or bowel bladder habits otherwise.     H&P:  The patient is a 77year old female with significant past medical history of anxiety, depression, hypertension, CKD, obesity, GERD, atherosclerosis, primary osteoarthr by mouth daily. 90 tablet 3   • acetaminophen 500 MG Oral Tab Take 1 tablet (500 mg total) by mouth every 4 (four) hours as needed for Pain or Fever.  20 tablet 0         ALLERGIES:     Codeine                       FAMILY HISTORY:     Family History   Prob joint  ROM: Decreased in extension with reproduction of symptoms, decreased in flexion due to body habitus  Strength: 5/5 in bilateral lower extremities  Sensation: Intact to light touch in all dermatomes of the lower extremities  Reflexes: 2/4 at L4 and S 1. Primary osteoarthritis of right hip    2. Primary osteoarthritis of right knee    3. Facet arthropathy, lumbar    4. Spinal stenosis of lumbar region with neurogenic claudication    5. Obesity, morbid, BMI 40.0-49.9 ([facility] [address]    6.  Gastroesophageal refl

## 2021-06-21 NOTE — PATIENT INSTRUCTIONS
-Tylenol as needed  -Glucosamine/Chondroitin 1500/1200mg daily  -Ice as much as tolerated  -Turmeric 1000mg daily  -Xray of the hip and knee today  -Aqua therapy  -Follow up in 4 weeks

## 2021-06-29 ENCOUNTER — PATIENT MESSAGE (OUTPATIENT)
Dept: INTERNAL MEDICINE CLINIC | Facility: CLINIC | Age: 76
End: 2021-06-29

## 2021-06-30 NOTE — TELEPHONE ENCOUNTER
From: Sushila Buckley  To: Reynaldo Huertas MD  Sent: 6/29/2021 6:44 PM CDT  Subject: Non-Urgent Medical Question    Dr Camacho Osei. This is Sushila Buckley. Is it ok to use cbd oil on my knees? I’m still taking sertraline 25mg can I take both?

## 2021-07-01 ENCOUNTER — MED REC SCAN ONLY (OUTPATIENT)
Dept: NEUROLOGY | Facility: CLINIC | Age: 76
End: 2021-07-01

## 2021-07-12 DIAGNOSIS — E78.2 MIXED HYPERLIPIDEMIA: ICD-10-CM

## 2021-07-12 NOTE — TELEPHONE ENCOUNTER
Medication request:GABAPENTIN 100 MG Oral Cap  Sig:   TAKE 1 CAPSULE(100 MG) BY MOUTH THREE TIMES DAILY    LOV: 6/21/21  NOV: None    [place]: 6/14/21 qty#90 r-0

## 2021-07-13 RX ORDER — ATORVASTATIN CALCIUM 40 MG/1
TABLET, FILM COATED ORAL
Qty: 90 TABLET | Refills: 1 | Status: SHIPPED | OUTPATIENT
Start: 2021-07-13 | End: 2021-07-15

## 2021-07-13 RX ORDER — GABAPENTIN 100 MG/1
100 CAPSULE ORAL 3 TIMES DAILY
Qty: 90 CAPSULE | Refills: 0 | Status: SHIPPED | OUTPATIENT
Start: 2021-07-13 | End: 2021-09-24

## 2021-07-13 RX ORDER — GABAPENTIN 100 MG/1
CAPSULE ORAL
Qty: 90 CAPSULE | Refills: 0 | OUTPATIENT
Start: 2021-07-13

## 2021-07-14 DIAGNOSIS — M10.9 GOUT, UNSPECIFIED CAUSE, UNSPECIFIED CHRONICITY, UNSPECIFIED SITE: ICD-10-CM

## 2021-07-14 DIAGNOSIS — K21.9 GASTROESOPHAGEAL REFLUX DISEASE WITHOUT ESOPHAGITIS: ICD-10-CM

## 2021-07-14 DIAGNOSIS — E78.2 MIXED HYPERLIPIDEMIA: ICD-10-CM

## 2021-07-15 RX ORDER — PANTOPRAZOLE SODIUM 40 MG/1
TABLET, DELAYED RELEASE ORAL
Qty: 90 TABLET | Refills: 3 | Status: SHIPPED | OUTPATIENT
Start: 2021-07-15

## 2021-07-15 RX ORDER — ATORVASTATIN CALCIUM 40 MG/1
TABLET, FILM COATED ORAL
Qty: 90 TABLET | Refills: 1 | Status: SHIPPED | OUTPATIENT
Start: 2021-07-15 | End: 2021-12-20

## 2021-07-15 RX ORDER — ALLOPURINOL 100 MG/1
TABLET ORAL
Qty: 90 TABLET | Refills: 3 | Status: SHIPPED | OUTPATIENT
Start: 2021-07-15 | End: 2021-12-20

## 2021-07-19 ENCOUNTER — HOSPITAL ENCOUNTER (OUTPATIENT)
Dept: GENERAL RADIOLOGY | Facility: HOSPITAL | Age: 76
Discharge: HOME | End: 2021-07-19
Attending: PHYSICAL MEDICINE & REHABILITATION
Payer: MEDICARE

## 2021-07-19 DIAGNOSIS — M16.11 PRIMARY OSTEOARTHRITIS OF RIGHT HIP: ICD-10-CM

## 2021-07-19 DIAGNOSIS — M17.11 PRIMARY OSTEOARTHRITIS OF RIGHT KNEE: ICD-10-CM

## 2021-07-19 PROCEDURE — 73564 X-RAY EXAM KNEE 4 OR MORE: CPT | Performed by: PHYSICAL MEDICINE & REHABILITATION

## 2021-07-19 PROCEDURE — 73502 X-RAY EXAM HIP UNI 2-3 VIEWS: CPT | Performed by: PHYSICAL MEDICINE & REHABILITATION

## 2021-08-03 ENCOUNTER — PATIENT MESSAGE (OUTPATIENT)
Dept: NEUROLOGY | Facility: CLINIC | Age: 76
End: 2021-08-03

## 2021-08-03 NOTE — TELEPHONE ENCOUNTER
From: Luis Quinn  To: Tin Edwards, DO  Sent: 8/3/2021 3:12 PM CDT  Subject: Other    Dr Zeferino Edwards. This Luis Quinn I would like to talk to you about the gel injection in my knee.  1945. [phone].  Thank you

## 2021-08-21 ENCOUNTER — OFFICE VISIT (OUTPATIENT)
Dept: FAMILY MEDICINE CLINIC | Facility: CLINIC | Age: 76
End: 2021-08-21
Payer: COMMERCIAL

## 2021-08-21 VITALS
WEIGHT: 271 LBS | HEIGHT: 66 IN | RESPIRATION RATE: 20 BRPM | OXYGEN SATURATION: 96 % | BODY MASS INDEX: 43.55 KG/M2 | DIASTOLIC BLOOD PRESSURE: 70 MMHG | SYSTOLIC BLOOD PRESSURE: 122 MMHG | TEMPERATURE: 98 F | HEART RATE: 72 BPM

## 2021-08-21 DIAGNOSIS — Z20.822 EXPOSURE TO COVID-19 VIRUS: Primary | ICD-10-CM

## 2021-08-21 PROCEDURE — 3078F DIAST BP <80 MM HG: CPT | Performed by: PHYSICIAN ASSISTANT

## 2021-08-21 PROCEDURE — 99202 OFFICE O/P NEW SF 15 MIN: CPT | Performed by: PHYSICIAN ASSISTANT

## 2021-08-21 PROCEDURE — 3074F SYST BP LT 130 MM HG: CPT | Performed by: PHYSICIAN ASSISTANT

## 2021-08-21 PROCEDURE — 3008F BODY MASS INDEX DOCD: CPT | Performed by: PHYSICIAN ASSISTANT

## 2021-08-21 NOTE — PATIENT INSTRUCTIONS
Coronavirus Disease 2019 (COVID-19)     [facility] is committed to the safety and well-being of our patients, members, employees, and communities.  As concerns arise about the new strain of coronavirus that causes COVID-19, [facility] exposure  • After day 7 from date of last exposure with a negative test result (test must occur on day 5 or later)  After stopping quarantine, you should  • Watch for symptoms until 14 days after exposure.   • If you have symptoms, immediately self-isolate Care     If you are awaiting test results or are confirmed positive for COVID -19, and your symptoms worsen at home with symptoms such as: extreme weakness, difficult breathing, or unrelenting fevers greater than 100.4 degrees Fahrenheit, you should contac Follow-up  If you are diagnosed with COVID, refrain from exercise until approved by your primary care provider. Please call your primary care provider within 2 days of your discharge to arrange for a telehealth follow-up.  CDC does not recommend repeat test Control & Prevention (CDC)  10 things you can do to manage your health at home, Ivan.nl. pdf  [email or web address] Retrieved March 17, 2021, from [email or web address].[email or web address]/coronavirus/covid-19-information/covid-19-long-haulers. html  Long-term effects of covid-19. (n.d.).  Retrieved May 11, 2021, from [email or web address]

## 2021-08-21 NOTE — PROGRESS NOTES
CHIEF COMPLAINT:   Patient presents with:  Covid-19 Test      HPI:   Neno Norman is a 77year old female who presents for COVID 19 testing. Needs testing due to exposure. Son sick on 8/8/21 and was found to be COVID positive. He was very sick.   He h breath, cough, or wheezing, See HPI  GI: denies N/V/C or abdominal pain  NEURO: Denies headaches    EXAM:   /70   Pulse 72   Temp 97.6 °F (36.4 °C)   Resp 20   Ht 5' 6\" (1.676 m)   Wt 271 lb (122.9 kg)   SpO2 96%   BMI 43.74 kg/m²   GENERAL: well de

## 2021-08-23 ENCOUNTER — TELEPHONE (OUTPATIENT)
Dept: FAMILY MEDICINE CLINIC | Facility: CLINIC | Age: 76
End: 2021-08-23

## 2021-08-23 DIAGNOSIS — U07.1 COVID-19: Primary | ICD-10-CM

## 2021-08-23 LAB — SARS-COV-2 RNA RESP QL NAA+PROBE: DETECTED

## 2021-08-23 NOTE — TELEPHONE ENCOUNTER
Pt tested positive for covid, tested on 8/21/21. Pt had nasal congestion starting Friday 8/20/21 and still reports nasal congestion. Denies any other URI or GI symptoms. Pt would like to proceed with PAB infusion, sent Fact sheet via [employer].      Rah

## 2021-08-24 ENCOUNTER — TELEPHONE (OUTPATIENT)
Dept: CASE MANAGEMENT | Age: 76
End: 2021-08-24

## 2021-08-24 ENCOUNTER — NURSE ONLY (OUTPATIENT)
Dept: INFUSION CENTER | Age: 76
End: 2021-08-24
Attending: PHYSICIAN ASSISTANT
Payer: MEDICARE

## 2021-08-24 VITALS
TEMPERATURE: 97 F | WEIGHT: 271 LBS | HEIGHT: 66 IN | RESPIRATION RATE: 20 BRPM | BODY MASS INDEX: 43.55 KG/M2 | HEART RATE: 55 BPM | SYSTOLIC BLOOD PRESSURE: 118 MMHG | DIASTOLIC BLOOD PRESSURE: 64 MMHG | OXYGEN SATURATION: 98 %

## 2021-08-24 NOTE — PROGRESS NOTES
1554- infusion started , iv wnl , pt aware of plan of care. 1614- infusion complete, iv flushed per protocol, pt stable denies any new complaints,     1635- pt stable , no distress noted, pt breathing easy.      1700- pt denies any new complaints , stab

## 2021-08-24 NOTE — PATIENT INSTRUCTIONS
FACT SHEET FOR PATIENTS, PARENTS AND CAREGIVERS   EMERGENCY USE AUTHORIZATION (EUA) OF REGEN-COVTM   (casirivimab and imdevimab) FOR CORONAVIRUS DISEASE 2019 (COVID-19)      You are being given a medicine called REGEN-COV (casirivimab and imdevimab) for of breath, which may appear 2 to 14 days after exposure. Serious illness including breathing problems can occur and may cause your other medical conditions to become worse. WHAT IS REGEN-COV (casirivimab and imdevimab)?    REGEN-COV is an investigation studied.  There is limited information known about the safety and effectiveness of using REGEN-COV to treat people with COVID-19 or to prevent COVID-19 in people who are at high risk of being exposed to someone who is infected with SARS-CoV-2.  REGEN-COV is would lead to a delay in treatment, then as an alternative, REGEN-COV can be given in the form of subcutaneous injections.   If you are receiving subcutaneous injections, your dose will be provided as multiple injections given in separate locations around t due to the progression of COVID-19. The side effects of getting any medicine by vein may include brief pain, bleeding, bruising of the skin, soreness, swelling, and possible infection at the infusion site.  The side effects of getting any medicine by subc prophylaxis for prevention of COVID-19. WHAT IF I AM PREGNANT OR BREASTFEEDING? There is limited experience using REGEN-COV (casirivimab and imdevimab) in pregnant women or breastfeeding mothers.  For a mother and unborn baby, the benefit of receivin there are no adequate, approved and available alternatives. All of these criteria must be met to allow for the medicine to be used in the treatment of COVID-19 or prevention of COVID-19 during the COVID-19 pandemic.    The EUA for REGEN-COV is in effect for

## 2021-08-24 NOTE — TELEPHONE ENCOUNTER
Referral for antibody infusion received from [facility].  Ab infusion scheduled for patient at [address] [facility] for today at 2:45. Advised patient to arrive 15 minutes prior to appt and to call Registration Hotline at [phone] once they arrive.

## 2021-08-25 ENCOUNTER — TELEPHONE (OUTPATIENT)
Dept: CASE MANAGEMENT | Age: 76
End: 2021-08-25

## 2021-08-25 NOTE — TELEPHONE ENCOUNTER
Pt received PAB infusion at [facility] on 8/24 for COVID-19. Please follow-up with pt for post-infusion assessment and home monitoring if needed. Thank you.

## 2021-08-25 NOTE — TELEPHONE ENCOUNTER
Home Monitoring Day 1 of 10. PAB infusion 8/24/2021    What  was your temp today? - 97.2    How did you take your temp?     with an oral thermometer    What was your pulse ox today? No does not have one     Are you feeling short of breath today? No      Is the shortness of breath better, the same, or worse than yesterday? na    Are you having a cough today? Yes    Is the cough better, the same, or worse than yesterday?    about the same    Are you experiencing weakness today? No      Is the weakness better, the same or worse than yesterday?     na    How is your appetite compared to yesterday?     about the same    Are you vomiting? No    Any loss of taste or smell? No      Nursing notes:  PAB was 8/24, fells about same,     Will back on 8/26    RN advised patient to call us back if questions about symptoms, however if symptoms get severely worse or if patient experiences shortness of breath, chest pain, severe pain, persistent low oxygen saturation readings, patient should go to ER. Patient verbalizes understanding of when to call our office and when to go to ER.

## 2021-08-30 ENCOUNTER — PATIENT MESSAGE (OUTPATIENT)
Dept: NEUROLOGY | Facility: CLINIC | Age: 76
End: 2021-08-30

## 2021-08-31 NOTE — TELEPHONE ENCOUNTER
From: Kassandra Schuler  To: Tasia Berrios DO  Sent: 8/30/2021 3:58 PM CDT  Subject: Non-Urgent Medical Question    This is Kassandra Schuler. I would like to see dr Lacy Berrios about my knee Bd 02/20/1945. Harley Cortez [phone].  Thank you

## 2021-09-07 ENCOUNTER — HOSPITAL ENCOUNTER (EMERGENCY)
Facility: HOSPITAL | Age: 76
Discharge: HOME | End: 2021-09-07
Attending: EMERGENCY MEDICINE
Payer: MEDICARE

## 2021-09-07 ENCOUNTER — APPOINTMENT (OUTPATIENT)
Dept: GENERAL RADIOLOGY | Facility: HOSPITAL | Age: 76
End: 2021-09-07
Attending: EMERGENCY MEDICINE
Payer: MEDICARE

## 2021-09-07 ENCOUNTER — TELEPHONE (OUTPATIENT)
Dept: INTERNAL MEDICINE CLINIC | Facility: CLINIC | Age: 76
End: 2021-09-07

## 2021-09-07 VITALS
RESPIRATION RATE: 12 BRPM | BODY MASS INDEX: 44 KG/M2 | DIASTOLIC BLOOD PRESSURE: 68 MMHG | OXYGEN SATURATION: 97 % | HEART RATE: 62 BPM | WEIGHT: 270.94 LBS | TEMPERATURE: 97 F | SYSTOLIC BLOOD PRESSURE: 147 MMHG

## 2021-09-07 DIAGNOSIS — R09.81 NASAL SINUS CONGESTION: ICD-10-CM

## 2021-09-07 DIAGNOSIS — I48.91 ATRIAL FIBRILLATION WITH CONTROLLED VENTRICULAR RESPONSE (HCC): Primary | ICD-10-CM

## 2021-09-07 DIAGNOSIS — R11.0 NAUSEA: ICD-10-CM

## 2021-09-07 LAB
ANION GAP SERPL CALC-SCNC: 7 MMOL/L (ref 0–18)
APTT PPP: 27.8 SECONDS (ref 23.2–35.3)
BASOPHILS # BLD AUTO: 0.08 X10(3) UL (ref 0–0.2)
BASOPHILS NFR BLD AUTO: 1.3 %
BILIRUB UR QL: NEGATIVE
BUN BLD-MCNC: 11 MG/DL (ref 7–18)
BUN/CREAT SERPL: 9.9 (ref 10–20)
CALCIUM BLD-MCNC: 9.8 MG/DL (ref 8.5–10.1)
CHLORIDE SERPL-SCNC: 105 MMOL/L (ref 98–112)
CO2 SERPL-SCNC: 26 MMOL/L (ref 21–32)
COLOR UR: YELLOW
CREAT BLD-MCNC: 1.11 MG/DL
DEPRECATED RDW RBC AUTO: 44.3 FL (ref 35.1–46.3)
EOSINOPHIL # BLD AUTO: 0.11 X10(3) UL (ref 0–0.7)
EOSINOPHIL NFR BLD AUTO: 1.7 %
ERYTHROCYTE [DISTWIDTH] IN BLOOD BY AUTOMATED COUNT: 13.9 % (ref 11–15)
GLUCOSE BLD-MCNC: 105 MG/DL (ref 70–99)
GLUCOSE UR-MCNC: NEGATIVE MG/DL
HCT VFR BLD AUTO: 40.6 %
HGB BLD-MCNC: 13 G/DL
HGB UR QL STRIP.AUTO: NEGATIVE
IMM GRANULOCYTES # BLD AUTO: 0.02 X10(3) UL (ref 0–1)
IMM GRANULOCYTES NFR BLD: 0.3 %
INR BLD: 1.08 (ref 0.9–1.2)
KETONES UR-MCNC: NEGATIVE MG/DL
LYMPHOCYTES # BLD AUTO: 1.8 X10(3) UL (ref 1–4)
LYMPHOCYTES NFR BLD AUTO: 28.3 %
MCH RBC QN AUTO: 28.1 PG (ref 26–34)
MCHC RBC AUTO-ENTMCNC: 32 G/DL (ref 31–37)
MCV RBC AUTO: 87.9 FL
MONOCYTES # BLD AUTO: 0.53 X10(3) UL (ref 0.1–1)
MONOCYTES NFR BLD AUTO: 8.3 %
NEUTROPHILS # BLD AUTO: 3.82 X10 (3) UL (ref 1.5–7.7)
NEUTROPHILS # BLD AUTO: 3.82 X10(3) UL (ref 1.5–7.7)
NEUTROPHILS NFR BLD AUTO: 60.1 %
NITRITE UR QL STRIP.AUTO: NEGATIVE
NT-PROBNP SERPL-MCNC: 26 PG/ML (ref ?–450)
OSMOLALITY SERPL CALC.SUM OF ELEC: 286 MOSM/KG (ref 275–295)
PH UR: 6 [PH] (ref 5–8)
PLATELET # BLD AUTO: 257 10(3)UL (ref 150–450)
POTASSIUM SERPL-SCNC: 3.6 MMOL/L (ref 3.5–5.1)
PROT UR-MCNC: NEGATIVE MG/DL
PROTHROMBIN TIME: 13.8 SECONDS (ref 11.8–14.5)
RBC # BLD AUTO: 4.62 X10(6)UL
SODIUM SERPL-SCNC: 138 MMOL/L (ref 136–145)
SP GR UR STRIP: 1.02 (ref 1–1.03)
TROPONIN I SERPL-MCNC: <0.045 NG/ML (ref ?–0.04)
UROBILINOGEN UR STRIP-ACNC: <2
WBC # BLD AUTO: 6.4 X10(3) UL (ref 4–11)

## 2021-09-07 PROCEDURE — 84484 ASSAY OF TROPONIN QUANT: CPT | Performed by: EMERGENCY MEDICINE

## 2021-09-07 PROCEDURE — 81001 URINALYSIS AUTO W/SCOPE: CPT | Performed by: EMERGENCY MEDICINE

## 2021-09-07 PROCEDURE — 93010 ELECTROCARDIOGRAM REPORT: CPT | Performed by: EMERGENCY MEDICINE

## 2021-09-07 PROCEDURE — 85610 PROTHROMBIN TIME: CPT | Performed by: EMERGENCY MEDICINE

## 2021-09-07 PROCEDURE — 85730 THROMBOPLASTIN TIME PARTIAL: CPT | Performed by: EMERGENCY MEDICINE

## 2021-09-07 PROCEDURE — 71045 X-RAY EXAM CHEST 1 VIEW: CPT | Performed by: EMERGENCY MEDICINE

## 2021-09-07 PROCEDURE — 93005 ELECTROCARDIOGRAM TRACING: CPT

## 2021-09-07 PROCEDURE — 36415 COLL VENOUS BLD VENIPUNCTURE: CPT

## 2021-09-07 PROCEDURE — 80048 BASIC METABOLIC PNL TOTAL CA: CPT | Performed by: EMERGENCY MEDICINE

## 2021-09-07 PROCEDURE — 83880 ASSAY OF NATRIURETIC PEPTIDE: CPT | Performed by: EMERGENCY MEDICINE

## 2021-09-07 PROCEDURE — 99285 EMERGENCY DEPT VISIT HI MDM: CPT

## 2021-09-07 PROCEDURE — 85025 COMPLETE CBC W/AUTO DIFF WBC: CPT | Performed by: EMERGENCY MEDICINE

## 2021-09-07 NOTE — CONSULTS
[facility] HOSP - [facility]  Cardiology Consultation    Rafa Morelia Patient Status:  Emergency    1945 MRN [id number]   Location [address] Attending Elizabeth Canales MD   Hosp Day # 0 PCP Adia Kelly MD current facility-administered medications for this encounter.     (Not in a hospital admission)    Allergies    Codeine                     Review of Systems:   14 point ROS negative unless otherwise stated in HPI    Physical Exam:     Patient Vitals for th 28.1   MCHC 32.0   RDW 13.9   NEPRELIM 3.82   WBC 6.4   .0       No results for input(s): BNPML in the last 168 hours.     Recent Labs   Lab 09/07/21  1101   TROP <0.045       Imaging:  XR CHEST AP PORTABLE  (CPT=71045)    Result Date: 9/7/2021  CONC Argenis Holden MD  [address] [place] [address]  9/7/2021

## 2021-09-07 NOTE — TELEPHONE ENCOUNTER
Patient calling reports went to ER and dx with Afib, was given Eliquis but her sinus issue was not addressed       Future Appointments   Date Time Provider [place]   9/8/2021  2:15 PM Vangie Childress MD [facility]     Patient scheduled for

## 2021-09-07 NOTE — ED PROVIDER NOTES
Patient Seen in: [facility] AND [facility] Emergency Department      History   Patient presents with:  Cough/URI  Nausea    Stated Complaint: Lightheadness and congestion    HPI/Subjective:   HPI    Patient presents the emergency department complaining of lighth Cardiovascular:      Rate and Rhythm: Normal rate. Rhythm irregularly irregular. Heart sounds: No murmur heard. Pulmonary:      Effort: Pulmonary effort is normal. No respiratory distress. Breath sounds: Normal breath sounds.    Abdominal: intervals and axes as noted on EKG Report.   Rate: 80 bpm  Rhythm: Atrial Fibrillation  Reading: Abnormal                       MDM      Pulse Ox: 98%, Normal,     Cardiac Monitor: Pulse Readings from Last 1 Encounters:  09/07/21 : 64  , atrial fibrillation

## 2021-09-07 NOTE — TELEPHONE ENCOUNTER
Per Dr. Wong Sharpe appt has to be changed to a video visit. Appt has been changed  I called pt and left her a message that her appt will be changed to a video appt to call us back to confirm she received the message.      Future Appointments   Date Time Provid

## 2021-09-07 NOTE — TELEPHONE ENCOUNTER
CSS pls give pt a ER f/u appt with . Thanks     Noted pt went to ER today.     Clinical Impressions       Atrial fibrillation with controlled ventricular response (HCC)     Nasal sinus congestion     Nausea  Disposition       Discharge        ED/I

## 2021-09-07 NOTE — ED INITIAL ASSESSMENT (HPI)
Patient here with c/o nausea since yesterday and cough/congestions x few days. States decreased appetite.

## 2021-09-07 NOTE — CM/SW NOTE
ED MD requested anticoagulant prescription card for pt. Retrieved a savings card for Eliquis for pt. Delivered card to patients bedside. Instructions given on use of card. Pt verbalized understanding of all instructions.  Dr Julián Pichardo notified

## 2021-09-07 NOTE — TELEPHONE ENCOUNTER
pt stated that she still has a lot of nausea congestion, stuffy nose and phlegm she feels she has post nasal dripping as she has been having post nasal dripping. Pt denied having a cough, fever or sob.  Pt has been vaccinated and did test positiv

## 2021-09-08 ENCOUNTER — TELEMEDICINE (OUTPATIENT)
Dept: INTERNAL MEDICINE CLINIC | Facility: CLINIC | Age: 76
End: 2021-09-08

## 2021-09-08 DIAGNOSIS — I48.91 ATRIAL FIBRILLATION, UNSPECIFIED TYPE (HCC): ICD-10-CM

## 2021-09-08 DIAGNOSIS — R09.81 SINUS CONGESTION: Primary | ICD-10-CM

## 2021-09-08 DIAGNOSIS — F32.0 CURRENT MILD EPISODE OF MAJOR DEPRESSIVE DISORDER, UNSPECIFIED WHETHER RECURRENT (HCC): ICD-10-CM

## 2021-09-08 PROCEDURE — 99213 OFFICE O/P EST LOW 20 MIN: CPT | Performed by: INTERNAL MEDICINE

## 2021-09-08 RX ORDER — METHYLPREDNISOLONE 4 MG/1
TABLET ORAL
Qty: 1 EACH | Refills: 0 | Status: SHIPPED | OUTPATIENT
Start: 2021-09-08 | End: 2021-10-25

## 2021-09-08 RX ORDER — SERTRALINE HYDROCHLORIDE 25 MG/1
12.5 TABLET, FILM COATED ORAL EVERY OTHER DAY
Qty: 45 TABLET | Refills: 1 | Status: SHIPPED | OUTPATIENT
Start: 2021-09-08 | End: 2021-09-10

## 2021-09-08 NOTE — PROGRESS NOTES
Patient ID: Nellie Ann is a 77year old female. Patient presents with:  Sick Call         HISTORY OF PRESENT ILLNESS:   Patient presents for above. This visit is conducted using Telemedicine with live, interactive video and audio.   Patient Humberto Zepeda mg total) by mouth 3 (three) times daily. , Disp: 90 capsule, Rfl: 0  •  SERTRALINE HCL 25 MG Oral Tab, TAKE ONE-HALF TABLET BY MOUTH EVERY OTHER DAY, Disp: 90 tablet, Rfl: 0  •  Triamterene-HCTZ 37.5-25 MG Oral Cap, TAKE 1 CAPSULE BY MOUTH DAILY, Disp: 90 (Medical):       Lack of Transportation (Non-Medical):   Physical Activity:       Days of Exercise per Week:       Minutes of Exercise per Session:   Stress:       Feeling of Stress :   Social Connections:       Frequency of Communication with Friends and effort was taken to allow for sufficient and adequate time to complete the visit. The patient was made aware of the limitations of the telehealth visit, including treatment limitations as no physical exam could be performed.   The patient was advised to ca

## 2021-09-08 NOTE — TELEPHONE ENCOUNTER
Refill passed per [place] [email or web address] [place] protocol. Requested Prescriptions   Pending Prescriptions Disp Refills    sertraline 25 MG Oral Tab 90 tablet 0     Sig: Take 0.5 tablets (12.5 mg total) by mouth every other day.         Psychiatric Non-Scheduled (Anti-A

## 2021-09-09 ENCOUNTER — TELEPHONE (OUTPATIENT)
Dept: INTERNAL MEDICINE CLINIC | Facility: CLINIC | Age: 76
End: 2021-09-09

## 2021-09-09 NOTE — TELEPHONE ENCOUNTER
Please advise. The patient stated has been taking the Sertraline 12.5 mg for 1 year now but would like to go back to taking 1 whole pill. Her anxiety has increased since Tuesday. She went to the ED and was diagnosed with A fib.   She stated since th

## 2021-09-10 RX ORDER — SERTRALINE HYDROCHLORIDE 25 MG/1
25 TABLET, FILM COATED ORAL DAILY
Qty: 90 TABLET | Refills: 1 | Status: SHIPPED | OUTPATIENT
Start: 2021-09-10 | End: 2021-12-20

## 2021-09-10 NOTE — TELEPHONE ENCOUNTER
Advised patient of Dr. Bridgett Hilliard note. Patient verbalized understanding  Patient requesting  updated script be sent to the pharmacy.      Dr. Filbierto Crain:   Please see pended script and sign   RN unsure if patient needs to take script every other day or daily

## 2021-09-15 ENCOUNTER — TELEPHONE (OUTPATIENT)
Dept: INTERNAL MEDICINE CLINIC | Facility: CLINIC | Age: 76
End: 2021-09-15

## 2021-09-15 NOTE — TELEPHONE ENCOUNTER
Pt asking if she can take her medrol pack (recently ordered by Dr. Kathryn Allred) while taking her other medications. I advised that she could as Dr. Kathryn Allred would have reviewed her medlist prior to ordered the steroid.

## 2021-09-20 ENCOUNTER — TELEPHONE (OUTPATIENT)
Dept: NEUROLOGY | Facility: CLINIC | Age: 76
End: 2021-09-20

## 2021-09-20 NOTE — TELEPHONE ENCOUNTER
This patient missed her appointment today, please have patient follow up. [place] location has availability.     LOV: 6/21/21

## 2021-09-24 RX ORDER — GABAPENTIN 100 MG/1
CAPSULE ORAL
Qty: 90 CAPSULE | Refills: 0 | Status: SHIPPED | OUTPATIENT
Start: 2021-09-24 | End: 2021-10-25

## 2021-09-24 NOTE — TELEPHONE ENCOUNTER
Medication request: gabapentin 100 MG Oral Cap  Sig:   Take 1 capsule (100 mg total) by mouth 3 (three) times daily.     LOV: 6/21/21  NOV: None    [place] 7/13/21 qty#90 r-0      Patient missed appointment on 9/20/21

## 2021-10-02 NOTE — TELEPHONE ENCOUNTER
Will give one week supply and then further refills by cards or she can call cardiology office , pt seen by me in march 2021 and she was not on this -- if there is a cost issue of cardiolgy will not refill then pls let me know - reviewed chart and noted it is for leandra landers

## 2021-10-25 ENCOUNTER — TELEPHONE (OUTPATIENT)
Dept: NEUROLOGY | Facility: CLINIC | Age: 76
End: 2021-10-25

## 2021-10-25 ENCOUNTER — OFFICE VISIT (OUTPATIENT)
Dept: PHYSICAL MEDICINE AND REHAB | Facility: CLINIC | Age: 76
End: 2021-10-25
Payer: COMMERCIAL

## 2021-10-25 VITALS
HEIGHT: 66 IN | DIASTOLIC BLOOD PRESSURE: 70 MMHG | HEART RATE: 72 BPM | BODY MASS INDEX: 41.78 KG/M2 | SYSTOLIC BLOOD PRESSURE: 126 MMHG | WEIGHT: 260 LBS

## 2021-10-25 DIAGNOSIS — F41.9 ANXIETY: ICD-10-CM

## 2021-10-25 DIAGNOSIS — M47.816 FACET ARTHROPATHY, LUMBAR: ICD-10-CM

## 2021-10-25 DIAGNOSIS — I10 ESSENTIAL HYPERTENSION: ICD-10-CM

## 2021-10-25 DIAGNOSIS — K21.9 GASTROESOPHAGEAL REFLUX DISEASE WITHOUT ESOPHAGITIS: ICD-10-CM

## 2021-10-25 DIAGNOSIS — E66.01 OBESITY, MORBID, BMI 40.0-49.9 (HCC): ICD-10-CM

## 2021-10-25 DIAGNOSIS — M48.062 SPINAL STENOSIS OF LUMBAR REGION WITH NEUROGENIC CLAUDICATION: ICD-10-CM

## 2021-10-25 DIAGNOSIS — M17.11 PRIMARY OSTEOARTHRITIS OF RIGHT KNEE: Primary | ICD-10-CM

## 2021-10-25 DIAGNOSIS — I48.91 ATRIAL FIBRILLATION, UNSPECIFIED TYPE (HCC): ICD-10-CM

## 2021-10-25 PROCEDURE — 3074F SYST BP LT 130 MM HG: CPT | Performed by: PHYSICAL MEDICINE & REHABILITATION

## 2021-10-25 PROCEDURE — 3008F BODY MASS INDEX DOCD: CPT | Performed by: PHYSICAL MEDICINE & REHABILITATION

## 2021-10-25 PROCEDURE — 20610 DRAIN/INJ JOINT/BURSA W/O US: CPT | Performed by: PHYSICAL MEDICINE & REHABILITATION

## 2021-10-25 PROCEDURE — 3078F DIAST BP <80 MM HG: CPT | Performed by: PHYSICAL MEDICINE & REHABILITATION

## 2021-10-25 PROCEDURE — 99214 OFFICE O/P EST MOD 30 MIN: CPT | Performed by: PHYSICAL MEDICINE & REHABILITATION

## 2021-10-25 RX ORDER — LIDOCAINE HYDROCHLORIDE 10 MG/ML
3 INJECTION, SOLUTION INFILTRATION; PERINEURAL ONCE
Status: COMPLETED | OUTPATIENT
Start: 2021-10-25 | End: 2021-10-25

## 2021-10-25 RX ORDER — GABAPENTIN 100 MG/1
CAPSULE ORAL
Qty: 90 CAPSULE | Refills: 0 | Status: SHIPPED | OUTPATIENT
Start: 2021-10-25 | End: 2021-11-22

## 2021-10-25 RX ORDER — GABAPENTIN 100 MG/1
100 CAPSULE ORAL 3 TIMES DAILY
Qty: 90 CAPSULE | Refills: 0 | OUTPATIENT
Start: 2021-10-25

## 2021-10-25 NOTE — PATIENT INSTRUCTIONS
Steroid Injection Information  What to expect: The injection contains Lidocaine (which numbs the area) and Gel One. You may have pain relief within hours of the injection due to the Lidocaine.   The Gel One can take a couple days, up to a couple weeks, to

## 2021-10-25 NOTE — TELEPHONE ENCOUNTER
Per Medicare guidelines authorization is not required for Right knee aspiration and injection with Gel One cpt codes 25229, [id number]. Will inform Nursing.

## 2021-10-25 NOTE — PROGRESS NOTES
[address]  FOLLOW UP EVALUATION      Chief Complaint: back pain. HISTORY OF PRESENT ILLNESS:   Patient presents with:  Knee Pain: Pain to knees, worse R. knee, 8/10 worse when walking.  Taking tylenol art injection in March which has provided good improvement. She denies any swelling or any changes in strength sensation or bowel bladder. She takes ibuprofen daily even though she recognizes that this is not good for her long-term.   She rates the pain to be notices that when she has ambulating if she leans forward her pain is improved.       PAST MEDICAL HISTORY:     Past Medical History:   Diagnosis Date   • Anxiety    • Essential hypertension    • Hyperlipidemia          PAST SURGICAL HISTORY:     Past Surgi Stiffness: admits  Painful Joints: admits   Neurological  Loss of Strength Since last Visit: admits  Tingling/Numbness: admits       PHYSICAL EXAM:   /70 (BP Location: Right arm, Patient Position: Sitting)   Pulse 72   Ht 66\"   Wt 260 lb (117.9 kg) HCT 40.6 09/07/2021    MCV 87.9 09/07/2021    MCH 28.1 09/07/2021    MCHC 32.0 09/07/2021    RDW 13.9 09/07/2021    .0 09/07/2021     Lab Results   Component Value Date     (H) 09/07/2021    BUN 11 09/07/2021    BUNCREA 9.9 (L) 09/07/2021 performed a right knee aspiration injection with hyaluronic acid. Please see procedure note for further details. I recommend that she continue home exercise program and advised her to use Tylenol and topical treatment as needed.   She will follow-up in 4

## 2021-10-25 NOTE — TELEPHONE ENCOUNTER
Medication request: Gabapentin 100mg tid    LOV 6/21/21  NOV 10/25/21    Last refill: Gabapentin 100mg #90 filled 9/24/21    Patient confirmed she is taking Gabapentin 100mg tid

## 2021-11-10 ENCOUNTER — MED REC SCAN ONLY (OUTPATIENT)
Dept: PHYSICAL MEDICINE AND REHAB | Facility: CLINIC | Age: 76
End: 2021-11-10

## 2021-11-22 RX ORDER — GABAPENTIN 100 MG/1
CAPSULE ORAL
Qty: 90 CAPSULE | Refills: 0 | Status: SHIPPED | OUTPATIENT
Start: 2021-11-22 | End: 2021-12-20

## 2021-11-22 NOTE — TELEPHONE ENCOUNTER
Medication request: GABAPENTIN 100 MG Oral Cap  Sig:   TAKE 1 CAPSULE(100 MG) BY MOUTH THREE TIMES DAILY  Qty#90R-0    LOV: 10/25/21  NOV: 11/23/21    [place]10/25/21

## 2021-11-23 ENCOUNTER — TELEPHONE (OUTPATIENT)
Dept: NEUROLOGY | Facility: CLINIC | Age: 76
End: 2021-11-23

## 2021-11-23 ENCOUNTER — OFFICE VISIT (OUTPATIENT)
Dept: PHYSICAL MEDICINE AND REHAB | Facility: CLINIC | Age: 76
End: 2021-11-23
Payer: COMMERCIAL

## 2021-11-23 VITALS
SYSTOLIC BLOOD PRESSURE: 120 MMHG | WEIGHT: 269 LBS | BODY MASS INDEX: 43.23 KG/M2 | HEIGHT: 66 IN | DIASTOLIC BLOOD PRESSURE: 60 MMHG | HEART RATE: 75 BPM | OXYGEN SATURATION: 95 %

## 2021-11-23 DIAGNOSIS — F41.9 ANXIETY: ICD-10-CM

## 2021-11-23 DIAGNOSIS — K21.9 GASTROESOPHAGEAL REFLUX DISEASE WITHOUT ESOPHAGITIS: ICD-10-CM

## 2021-11-23 DIAGNOSIS — I10 ESSENTIAL HYPERTENSION: ICD-10-CM

## 2021-11-23 DIAGNOSIS — E66.01 OBESITY, MORBID, BMI 40.0-49.9 (HCC): ICD-10-CM

## 2021-11-23 DIAGNOSIS — M17.0 PRIMARY OSTEOARTHRITIS OF KNEES, BILATERAL: Primary | ICD-10-CM

## 2021-11-23 DIAGNOSIS — G56.03 BILATERAL CARPAL TUNNEL SYNDROME: ICD-10-CM

## 2021-11-23 PROCEDURE — 3078F DIAST BP <80 MM HG: CPT | Performed by: PHYSICAL MEDICINE & REHABILITATION

## 2021-11-23 PROCEDURE — 3008F BODY MASS INDEX DOCD: CPT | Performed by: PHYSICAL MEDICINE & REHABILITATION

## 2021-11-23 PROCEDURE — 20610 DRAIN/INJ JOINT/BURSA W/O US: CPT | Performed by: PHYSICAL MEDICINE & REHABILITATION

## 2021-11-23 PROCEDURE — 99214 OFFICE O/P EST MOD 30 MIN: CPT | Performed by: PHYSICAL MEDICINE & REHABILITATION

## 2021-11-23 PROCEDURE — 3074F SYST BP LT 130 MM HG: CPT | Performed by: PHYSICAL MEDICINE & REHABILITATION

## 2021-11-23 RX ORDER — LIDOCAINE HYDROCHLORIDE 10 MG/ML
6 INJECTION, SOLUTION INFILTRATION; PERINEURAL ONCE
Status: COMPLETED | OUTPATIENT
Start: 2021-11-23 | End: 2021-11-23

## 2021-11-23 RX ORDER — TRIAMCINOLONE ACETONIDE 40 MG/ML
80 INJECTION, SUSPENSION INTRA-ARTICULAR; INTRAMUSCULAR ONCE
Status: COMPLETED | OUTPATIENT
Start: 2021-11-23 | End: 2021-11-23

## 2021-11-23 NOTE — TELEPHONE ENCOUNTER
Bilateral knee aspiration and injection with corticosteroid CPT CODE: 84197, -BQ authorization is required    [facility] online for authorization of approval for above.  Notification or Prior Authorization is not required for the requested servicesDecision ID

## 2021-11-23 NOTE — PROGRESS NOTES
[address]  FOLLOW UP EVALUATION      Chief Complaint: knee pain. HISTORY OF PRESENT ILLNESS:   Patient presents with:  Knee Pain: LOV 10/25, f/u after R. knee gel one inj. 20% relief for first 2 weeks.  P her pain returns. Patient is looking for other options for management. She has had physical therapy in the past for her knee as well as low back but does not notice any significant improvement with this.   She has had x-ray imaging of the knee as noted be otherwise. H&P:  The patient is a 77year old female with significant past medical history of anxiety, depression, hypertension, CKD, obesity, GERD, atherosclerosis, primary osteoarthritis of bilateral knees who presents with low back pain.   Patient den tablet (5 mg total) by mouth daily.  90 tablet 3         ALLERGIES:     Codeine                       FAMILY HISTORY:     Family History   Problem Relation Age of Onset   • Hypertension Father    • Kidney Disease Father    • Diabetes Neg    • Glaucoma Neg muscles, nontender SI joint  ROM: Decreased in extension with reproduction of symptoms, decreased in flexion due to body habitus  Strength: 5/5 in bilateral lower extremities  Sensation: Intact to light touch in all dermatomes of the lower extremities  Ref degenerative disc and facet disease throughout the lumbar spine with severe stenosis at multiple levels with a hyperdensity in the canal at L3-4 which may be an extruded disc fragment. All imaging results were reviewed and discussed with patient.

## 2021-12-01 ENCOUNTER — OFFICE VISIT (OUTPATIENT)
Dept: INTERNAL MEDICINE CLINIC | Facility: CLINIC | Age: 76
End: 2021-12-01
Payer: COMMERCIAL

## 2021-12-01 VITALS
HEIGHT: 66 IN | BODY MASS INDEX: 44.84 KG/M2 | DIASTOLIC BLOOD PRESSURE: 77 MMHG | WEIGHT: 279 LBS | SYSTOLIC BLOOD PRESSURE: 124 MMHG | HEART RATE: 62 BPM

## 2021-12-01 DIAGNOSIS — Z91.89 AT RISK FOR DECREASED BONE DENSITY: ICD-10-CM

## 2021-12-01 DIAGNOSIS — Z12.31 SCREENING MAMMOGRAM, ENCOUNTER FOR: ICD-10-CM

## 2021-12-01 DIAGNOSIS — I10 ESSENTIAL HYPERTENSION: Primary | ICD-10-CM

## 2021-12-01 DIAGNOSIS — Z12.11 COLON CANCER SCREENING: ICD-10-CM

## 2021-12-01 DIAGNOSIS — M17.0 PRIMARY OSTEOARTHRITIS OF KNEES, BILATERAL: ICD-10-CM

## 2021-12-01 DIAGNOSIS — I48.91 ATRIAL FIBRILLATION, UNSPECIFIED TYPE (HCC): ICD-10-CM

## 2021-12-01 DIAGNOSIS — Z78.0 ASYMPTOMATIC MENOPAUSAL STATE: ICD-10-CM

## 2021-12-01 PROCEDURE — 99214 OFFICE O/P EST MOD 30 MIN: CPT | Performed by: INTERNAL MEDICINE

## 2021-12-01 PROCEDURE — 3074F SYST BP LT 130 MM HG: CPT | Performed by: INTERNAL MEDICINE

## 2021-12-01 PROCEDURE — 3008F BODY MASS INDEX DOCD: CPT | Performed by: INTERNAL MEDICINE

## 2021-12-01 PROCEDURE — 3078F DIAST BP <80 MM HG: CPT | Performed by: INTERNAL MEDICINE

## 2021-12-01 NOTE — PROGRESS NOTES
Sheila Trevino is a 77year old female.   Patient presents with:  HTN      HPI:   Pt comes for f/u  C/c htn  C/o noted blood pressure was good today  Seeing physiatry Dr. Miriam Duvall and got 2 injections in her knee and in the summer get injections to her gena Never Smoker      Smokeless tobacco: Never Used    Vaping Use      Vaping Use: Never used    Alcohol use: No    Drug use: Never       REVIEW OF SYSTEMS:   GENERAL HEALTH: No fevers, chills, sweats, fatigue  VISION: No recent vision problems, blurry vision process         Preventative medicine  Labs 7/2020 reviewed   DEXA scan in 2016 in care everywhere was normal, reordered   cscope -will refer but will do fit test   Mammogram 11/2020          The patient indicates understanding of these issues and agrees t

## 2021-12-01 NOTE — PATIENT INSTRUCTIONS
Bone Density Study  A bone density study helps diagnose brittle bones (osteoporosis). Scans of your lower back, hip, or forearm are taken to measure the amount of calcium (density) in your bones. Calcium is the mineral that makes up your bones.    What to

## 2021-12-20 DIAGNOSIS — E78.2 MIXED HYPERLIPIDEMIA: ICD-10-CM

## 2021-12-20 DIAGNOSIS — I10 ESSENTIAL HYPERTENSION: ICD-10-CM

## 2021-12-20 DIAGNOSIS — M10.9 GOUT, UNSPECIFIED CAUSE, UNSPECIFIED CHRONICITY, UNSPECIFIED SITE: ICD-10-CM

## 2021-12-20 RX ORDER — ALLOPURINOL 100 MG/1
100 TABLET ORAL DAILY
Qty: 90 TABLET | Refills: 1 | Status: SHIPPED | OUTPATIENT
Start: 2021-12-20

## 2021-12-20 RX ORDER — GABAPENTIN 100 MG/1
CAPSULE ORAL
Qty: 90 CAPSULE | Refills: 0 | Status: SHIPPED | OUTPATIENT
Start: 2021-12-20 | End: 2022-01-17

## 2021-12-20 RX ORDER — TRIAMTERENE AND HYDROCHLOROTHIAZIDE 37.5; 25 MG/1; MG/1
CAPSULE ORAL
Qty: 90 CAPSULE | Refills: 1 | Status: SHIPPED | OUTPATIENT
Start: 2021-12-20

## 2021-12-20 RX ORDER — SERTRALINE HYDROCHLORIDE 25 MG/1
25 TABLET, FILM COATED ORAL DAILY
Qty: 90 TABLET | Refills: 1 | Status: SHIPPED | OUTPATIENT
Start: 2021-12-20

## 2021-12-20 RX ORDER — GABAPENTIN 100 MG/1
100 CAPSULE ORAL 3 TIMES DAILY
Qty: 90 CAPSULE | Refills: 0 | Status: CANCELLED | OUTPATIENT
Start: 2021-12-20

## 2021-12-20 RX ORDER — ATORVASTATIN CALCIUM 40 MG/1
40 TABLET, FILM COATED ORAL DAILY
Qty: 90 TABLET | Refills: 1 | Status: SHIPPED | OUTPATIENT
Start: 2021-12-20 | End: 2022-01-13

## 2021-12-20 RX ORDER — AMLODIPINE BESYLATE 5 MG/1
5 TABLET ORAL DAILY
Qty: 90 TABLET | Refills: 1 | Status: SHIPPED | OUTPATIENT
Start: 2021-12-20

## 2021-12-20 NOTE — TELEPHONE ENCOUNTER
Refill passed per [facility] [place] protocol.   Requested Prescriptions   Pending Prescriptions Disp Refills    triamterene-hydroCHLOROthiazide 37.5-25 MG Oral Cap 90 capsule 3     Sig: TAKE 1 CAPSULE BY MOUTH DAILY        Hypertensive Medications Protocol Pa Visit    3 weeks ago Primary osteoarthritis of knees, bilateral    [facility] Migdalia Ramirez DO    Office Visit    1 month ago Primary osteoarthritis of right knee    [facility]

## 2021-12-20 NOTE — TELEPHONE ENCOUNTER
Please review. Protocol Failed / No Protocol. Requested Prescriptions   Pending Prescriptions Disp Refills    allopurinol 100 MG Oral Tab 90 tablet 1     Sig: Take 1 tablet (100 mg total) by mouth daily.         There is no refill protocol information f Sindhu Allen MD    Office Visit    3 weeks ago Primary osteoarthritis of knees, bilateral    [facility] [place]    Office Visit    1 month ago Primary osteoarthritis of righ

## 2021-12-20 NOTE — TELEPHONE ENCOUNTER
Medication request: GABAPENTIN 100 MG Oral Cap.  TAKE 1 CAPSULE(100 MG) BY MOUTH THREE TIMES DAILY    LOV:  11/23/2021   NOV:  01/19/2021    [place]: 11/22/2021

## 2022-01-13 DIAGNOSIS — E78.2 MIXED HYPERLIPIDEMIA: ICD-10-CM

## 2022-01-13 RX ORDER — ATORVASTATIN CALCIUM 40 MG/1
40 TABLET, FILM COATED ORAL DAILY
Qty: 90 TABLET | Refills: 1 | Status: SHIPPED | OUTPATIENT
Start: 2022-01-13

## 2022-01-13 NOTE — TELEPHONE ENCOUNTER
Please review. Protocol Failed / No Protocol.     Requested Prescriptions   Pending Prescriptions Disp Refills    ATORVASTATIN 40 MG Oral Tab [Pharmacy Med Name: ATORVASTATIN 40MG TABLETS] 90 tablet 1     Sig: TAKE 1 TABLET BY MOUTH DAILY        Cholestero

## 2022-01-17 ENCOUNTER — APPOINTMENT (OUTPATIENT)
Dept: LAB | Facility: HOSPITAL | Age: 77
End: 2022-01-17
Attending: INTERNAL MEDICINE
Payer: MEDICARE

## 2022-01-17 PROCEDURE — 82274 ASSAY TEST FOR BLOOD FECAL: CPT

## 2022-01-17 RX ORDER — GABAPENTIN 100 MG/1
CAPSULE ORAL
Qty: 90 CAPSULE | Refills: 0 | Status: SHIPPED | OUTPATIENT
Start: 2022-01-17

## 2022-01-17 NOTE — TELEPHONE ENCOUNTER
Medication request: GABAPENTIN 100 MG Oral Cap   TAKE 1 CAPSULE(100 MG) BY MOUTH THREE TIMES DAILY    LOV- 11/23/21  NOV- 1/20/22    [place]: 12/21/21 #90 r-0

## 2022-01-18 ENCOUNTER — OFFICE VISIT (OUTPATIENT)
Dept: INTERNAL MEDICINE CLINIC | Facility: CLINIC | Age: 77
End: 2022-01-18
Payer: COMMERCIAL

## 2022-01-18 ENCOUNTER — LAB ENCOUNTER (OUTPATIENT)
Dept: LAB | Age: 77
End: 2022-01-18
Attending: INTERNAL MEDICINE
Payer: MEDICARE

## 2022-01-18 VITALS
SYSTOLIC BLOOD PRESSURE: 120 MMHG | RESPIRATION RATE: 17 BRPM | DIASTOLIC BLOOD PRESSURE: 67 MMHG | WEIGHT: 283 LBS | BODY MASS INDEX: 45.48 KG/M2 | HEIGHT: 66 IN | HEART RATE: 66 BPM

## 2022-01-18 DIAGNOSIS — Z00.00 ENCOUNTER FOR ANNUAL HEALTH EXAMINATION: ICD-10-CM

## 2022-01-18 DIAGNOSIS — G91.2 (IDIOPATHIC) NORMAL PRESSURE HYDROCEPHALUS (HCC): ICD-10-CM

## 2022-01-18 DIAGNOSIS — D69.2 SENILE PURPURA (HCC): ICD-10-CM

## 2022-01-18 DIAGNOSIS — M47.819 ARTHRITIS OF LOW BACK: ICD-10-CM

## 2022-01-18 DIAGNOSIS — I10 ESSENTIAL HYPERTENSION: ICD-10-CM

## 2022-01-18 DIAGNOSIS — M47.816 FACET ARTHROPATHY, LUMBAR: ICD-10-CM

## 2022-01-18 DIAGNOSIS — F41.9 ANXIETY: ICD-10-CM

## 2022-01-18 DIAGNOSIS — Z23 NEED FOR VACCINATION: ICD-10-CM

## 2022-01-18 DIAGNOSIS — K21.9 GASTROESOPHAGEAL REFLUX DISEASE WITHOUT ESOPHAGITIS: ICD-10-CM

## 2022-01-18 DIAGNOSIS — I48.91 ATRIAL FIBRILLATION, UNSPECIFIED TYPE (HCC): ICD-10-CM

## 2022-01-18 DIAGNOSIS — F32.0 CURRENT MILD EPISODE OF MAJOR DEPRESSIVE DISORDER, UNSPECIFIED WHETHER RECURRENT (HCC): ICD-10-CM

## 2022-01-18 DIAGNOSIS — Z12.11 COLON CANCER SCREENING: ICD-10-CM

## 2022-01-18 DIAGNOSIS — I70.0 ATHEROSCLEROSIS OF AORTA (HCC): ICD-10-CM

## 2022-01-18 DIAGNOSIS — M54.50 CHRONIC MIDLINE LOW BACK PAIN WITHOUT SCIATICA: ICD-10-CM

## 2022-01-18 DIAGNOSIS — E79.0 ELEVATED BLOOD URIC ACID LEVEL: ICD-10-CM

## 2022-01-18 DIAGNOSIS — M10.9 GOUT, UNSPECIFIED CAUSE, UNSPECIFIED CHRONICITY, UNSPECIFIED SITE: ICD-10-CM

## 2022-01-18 DIAGNOSIS — H02.886 MEIBOMIAN GLAND DYSFUNCTION (MGD) OF BOTH EYES: ICD-10-CM

## 2022-01-18 DIAGNOSIS — M48.062 SPINAL STENOSIS OF LUMBAR REGION WITH NEUROGENIC CLAUDICATION: ICD-10-CM

## 2022-01-18 DIAGNOSIS — E66.01 OBESITY, MORBID, BMI 40.0-49.9 (HCC): ICD-10-CM

## 2022-01-18 DIAGNOSIS — R73.03 BORDERLINE DIABETES: ICD-10-CM

## 2022-01-18 DIAGNOSIS — N18.30 STAGE 3 CHRONIC KIDNEY DISEASE, UNSPECIFIED WHETHER STAGE 3A OR 3B CKD (HCC): ICD-10-CM

## 2022-01-18 DIAGNOSIS — E78.2 MIXED HYPERLIPIDEMIA: ICD-10-CM

## 2022-01-18 DIAGNOSIS — H02.883 MEIBOMIAN GLAND DYSFUNCTION (MGD) OF BOTH EYES: ICD-10-CM

## 2022-01-18 DIAGNOSIS — F32.4 MAJOR DEPRESSIVE DISORDER WITH SINGLE EPISODE, IN PARTIAL REMISSION (HCC): Chronic | ICD-10-CM

## 2022-01-18 DIAGNOSIS — G89.29 CHRONIC MIDLINE LOW BACK PAIN WITHOUT SCIATICA: ICD-10-CM

## 2022-01-18 DIAGNOSIS — G56.03 BILATERAL CARPAL TUNNEL SYNDROME: ICD-10-CM

## 2022-01-18 DIAGNOSIS — M17.11 PRIMARY OSTEOARTHRITIS OF RIGHT KNEE: ICD-10-CM

## 2022-01-18 DIAGNOSIS — M17.0 PRIMARY OSTEOARTHRITIS OF BOTH KNEES: ICD-10-CM

## 2022-01-18 DIAGNOSIS — M17.0 PRIMARY OSTEOARTHRITIS OF KNEES, BILATERAL: ICD-10-CM

## 2022-01-18 DIAGNOSIS — M46.96 INFLAMMATORY SPONDYLOPATHY OF LUMBAR REGION (HCC): ICD-10-CM

## 2022-01-18 DIAGNOSIS — Z00.00 ENCOUNTER FOR ANNUAL HEALTH EXAMINATION: Primary | ICD-10-CM

## 2022-01-18 LAB
ALBUMIN SERPL-MCNC: 3.6 G/DL (ref 3.4–5)
ALBUMIN/GLOB SERPL: 1 {RATIO} (ref 1–2)
ALP LIVER SERPL-CCNC: 159 U/L
ALT SERPL-CCNC: 24 U/L
ANION GAP SERPL CALC-SCNC: 8 MMOL/L (ref 0–18)
AST SERPL-CCNC: 19 U/L (ref 15–37)
BILIRUB SERPL-MCNC: 0.5 MG/DL (ref 0.1–2)
BUN BLD-MCNC: 14 MG/DL (ref 7–18)
BUN/CREAT SERPL: 12.7 (ref 10–20)
CALCIUM BLD-MCNC: 9.6 MG/DL (ref 8.5–10.1)
CHLORIDE SERPL-SCNC: 104 MMOL/L (ref 98–112)
CHOLEST SERPL-MCNC: 172 MG/DL (ref ?–200)
CO2 SERPL-SCNC: 29 MMOL/L (ref 21–32)
CREAT BLD-MCNC: 1.1 MG/DL
EST. AVERAGE GLUCOSE BLD GHB EST-MCNC: 134 MG/DL (ref 68–126)
FASTING PATIENT LIPID ANSWER: YES
FASTING STATUS PATIENT QL REPORTED: YES
GLOBULIN PLAS-MCNC: 3.7 G/DL (ref 2.8–4.4)
GLUCOSE BLD-MCNC: 117 MG/DL (ref 70–99)
HBA1C MFR BLD: 6.3 % (ref ?–5.7)
HDLC SERPL-MCNC: 53 MG/DL (ref 40–59)
HEMOCCULT STL QL: NEGATIVE
LDLC SERPL CALC-MCNC: 109 MG/DL (ref ?–100)
NONHDLC SERPL-MCNC: 119 MG/DL (ref ?–130)
OSMOLALITY SERPL CALC.SUM OF ELEC: 294 MOSM/KG (ref 275–295)
POTASSIUM SERPL-SCNC: 4 MMOL/L (ref 3.5–5.1)
PROT SERPL-MCNC: 7.3 G/DL (ref 6.4–8.2)
SODIUM SERPL-SCNC: 141 MMOL/L (ref 136–145)
TRIGL SERPL-MCNC: 51 MG/DL (ref 30–149)
TSI SER-ACNC: 2.43 MIU/ML (ref 0.36–3.74)
URATE SERPL-MCNC: 3.9 MG/DL
VLDLC SERPL CALC-MCNC: 9 MG/DL (ref 0–30)

## 2022-01-18 PROCEDURE — 80053 COMPREHEN METABOLIC PANEL: CPT

## 2022-01-18 PROCEDURE — G0008 ADMIN INFLUENZA VIRUS VAC: HCPCS | Performed by: INTERNAL MEDICINE

## 2022-01-18 PROCEDURE — 3074F SYST BP LT 130 MM HG: CPT | Performed by: INTERNAL MEDICINE

## 2022-01-18 PROCEDURE — 80061 LIPID PANEL: CPT

## 2022-01-18 PROCEDURE — 90662 IIV NO PRSV INCREASED AG IM: CPT | Performed by: INTERNAL MEDICINE

## 2022-01-18 PROCEDURE — 3078F DIAST BP <80 MM HG: CPT | Performed by: INTERNAL MEDICINE

## 2022-01-18 PROCEDURE — 36415 COLL VENOUS BLD VENIPUNCTURE: CPT

## 2022-01-18 PROCEDURE — G0439 PPPS, SUBSEQ VISIT: HCPCS | Performed by: INTERNAL MEDICINE

## 2022-01-18 PROCEDURE — 83036 HEMOGLOBIN GLYCOSYLATED A1C: CPT

## 2022-01-18 PROCEDURE — 84443 ASSAY THYROID STIM HORMONE: CPT

## 2022-01-18 PROCEDURE — 84550 ASSAY OF BLOOD/URIC ACID: CPT

## 2022-01-18 PROCEDURE — 99397 PER PM REEVAL EST PAT 65+ YR: CPT | Performed by: INTERNAL MEDICINE

## 2022-01-18 PROCEDURE — 96160 PT-FOCUSED HLTH RISK ASSMT: CPT | Performed by: INTERNAL MEDICINE

## 2022-01-18 PROCEDURE — 3008F BODY MASS INDEX DOCD: CPT | Performed by: INTERNAL MEDICINE

## 2022-01-18 NOTE — PROGRESS NOTES
Subjective:   Rohan Castaneda is a 77year old female who presents for a MA (Medicare Advantage) Supervisit (Once per calendar year).    Patient comes for her Medicare advantage visit–overall doing well today  Did not get her homemaker services but her Health Care. [Do you have a healthcare power of ?: [facility] [facility] with patient (and family/surrogate if present). Standard forms made available to patient in After Visit Summary.       Patient Active Problem List:     Primary o History:  She  has a past medical history of Anxiety, Chronic atrial fibrillation ([address] Essential hypertension, and Hyperlipidemia. Surgical History:  She  has a past surgical history that includes appendectomy; back surgery; and other surgical history. BS's,no masses or tenderness  EXTREMITIES: no cyanosis, or edema  BREAST: Bilateral breasts without any lumps, bilateral axilla without any lymphadenopathy, no nipple retraction or  discharge      /67 (BP Location: Right arm, Patient Position: Sittin Visual Acuity: Corrected Both Eyes Chart Acuity: 20/30   Able To Tolerate Visual Acuity: Yes        Assessment & Plan:   Naima Tenorio is a 77year old female who presents for a Medicare Assessment.      1. Encounter for annual health examination (Prim esophagitis  Stable on medications discontinue  11. Elevated blood uric acid level  On allopurinol, continue  12. Stage 3 chronic kidney disease, unspecified whether stage 3a or 3b CKD (HCC)  Avoid NSAIDs, monitor kidney function  13.  Essential hypertensio Brian Jacobo MD, 1/18/2022     Supplementary Documentation:   General Health:   In the past six months, have you lost more than 10 pounds without trying?: 2 - No  Has your appetite been poor?: No  Type of Diet: Balanced  How does the patient maintain a for medical reasons 09/07/2021      Ultrasound Screening for Abdominal Aortic Aneurysm (AAA) Covered once in a lifetime for one of the following risk factors   • Men who are 73-68 years old and have ever smoked   • Anyone with a family history -     Gael recommendations at this time    Zoster Not covered by Medicare Part B; may be covered with your pharmacy  prescription benefits -  Zoster Vaccines(1 of 2) Never done       Annual Monitoring of Persistent Medications (ACE/ARB, digoxin diuretics, anticonvuls

## 2022-01-18 NOTE — PATIENT INSTRUCTIONS
Juanito Matamoros's SCREENING SCHEDULE   Tests on this list are recommended by your physician but may not be covered, or covered at this frequency, by your insurer. Please check with your insurance carrier before scheduling to verify coverage.    Boubacar Singletary visit.      Alexia Ruiz Never done   Pap and Pelvic    Pap   Covered every 2 years for women at normal risk;  Annually if at high risk -  No recommendations at this time    Chlamydia Annually if high risk 03/25/2021  No recommendations at this time   Screenin

## 2022-01-20 ENCOUNTER — OFFICE VISIT (OUTPATIENT)
Dept: PHYSICAL MEDICINE AND REHAB | Facility: CLINIC | Age: 77
End: 2022-01-20
Payer: COMMERCIAL

## 2022-01-20 ENCOUNTER — TELEPHONE (OUTPATIENT)
Dept: NEUROLOGY | Facility: CLINIC | Age: 77
End: 2022-01-20

## 2022-01-20 VITALS
HEART RATE: 58 BPM | WEIGHT: 280 LBS | BODY MASS INDEX: 45 KG/M2 | SYSTOLIC BLOOD PRESSURE: 122 MMHG | OXYGEN SATURATION: 99 % | HEIGHT: 66 IN | DIASTOLIC BLOOD PRESSURE: 62 MMHG

## 2022-01-20 DIAGNOSIS — M17.11 PRIMARY OSTEOARTHRITIS OF RIGHT KNEE: Primary | ICD-10-CM

## 2022-01-20 DIAGNOSIS — I10 ESSENTIAL HYPERTENSION: ICD-10-CM

## 2022-01-20 DIAGNOSIS — K21.9 GASTROESOPHAGEAL REFLUX DISEASE WITHOUT ESOPHAGITIS: ICD-10-CM

## 2022-01-20 DIAGNOSIS — E66.01 OBESITY, MORBID, BMI 40.0-49.9 (HCC): ICD-10-CM

## 2022-01-20 DIAGNOSIS — I48.91 ATRIAL FIBRILLATION, UNSPECIFIED TYPE (HCC): ICD-10-CM

## 2022-01-20 PROCEDURE — 3078F DIAST BP <80 MM HG: CPT | Performed by: PHYSICAL MEDICINE & REHABILITATION

## 2022-01-20 PROCEDURE — 3074F SYST BP LT 130 MM HG: CPT | Performed by: PHYSICAL MEDICINE & REHABILITATION

## 2022-01-20 PROCEDURE — 99214 OFFICE O/P EST MOD 30 MIN: CPT | Performed by: PHYSICAL MEDICINE & REHABILITATION

## 2022-01-20 PROCEDURE — 3008F BODY MASS INDEX DOCD: CPT | Performed by: PHYSICAL MEDICINE & REHABILITATION

## 2022-01-20 NOTE — TELEPHONE ENCOUNTER
Ultrasound guided right knee aspiration and injection with corticosteroid cpt code: 07434, [address] online for authorization of approval for above. Notification or Prior Authorization is not required for the requested services.  Decision ID #

## 2022-01-21 NOTE — TELEPHONE ENCOUNTER
Called patient to schedule her injection and gave her date and time options and she put me on hold for 6 minutes. I couldn't keep waiting, I'm rooming patients.

## 2022-01-24 PROBLEM — D69.2 SENILE PURPURA: Status: ACTIVE | Noted: 2022-01-24

## 2022-01-24 PROBLEM — D69.2 SENILE PURPURA (HCC): Status: ACTIVE | Noted: 2022-01-24

## 2022-01-24 NOTE — PROGRESS NOTES
[address] [facility]  FOLLOW UP EVALUATION      Chief Complaint: knee pain. HISTORY OF PRESENT ILLNESS:   Patient presents with:  Knee Pain: LOV: 11/23/21 Patient c/o bilateral knee pain, denies N/T.   States 40-95-78-17 swelling as well. She denies any radiating symptoms or any other mechanical symptoms. As far as the low back pain is concerned, this is still well managed after facet joint injections. She denies any radiation or radicular symptoms in the left leg.   She noted improvement with this as well in the radicular symptoms in the bilateral lower extremities. She still has persistent low back pain that is worsened with standing and ambulation and with extension and when trying to straighten up.   She notices some r mouth daily. 90 tablet 1   • triamterene-hydroCHLOROthiazide 37.5-25 MG Oral Cap TAKE 1 CAPSULE BY MOUTH DAILY 90 capsule 1   • amLODIPine 5 MG Oral Tab Take 1 tablet (5 mg total) by mouth daily.  90 tablet 1   • allopurinol 100 MG Oral Tab Take 1 tablet (1 alert & oriented x 3, attentive, able to follow 2 step commands, comprehention intact, spontaneous speech intact  Psychiatric: Mood and affect appropriate    Gait Normal     Musculoskeletal/Neurological Exam:    KNEE/HIP/LUMBAR SPINE:  Inspection: no eryth Component Value Date    PTP 13.8 09/07/2021    INR 1.08 09/07/2021     No results found for: VITD, QVITD, XUVI64NR    IMAGING:   Xray Right knee 7/19/21  I personally reviewed x-ray imaging which is notable for tricompartmental osteoarthritis with findin

## 2022-01-31 ENCOUNTER — HOSPITAL ENCOUNTER (OUTPATIENT)
Dept: MAMMOGRAPHY | Facility: HOSPITAL | Age: 77
Discharge: HOME | End: 2022-01-31
Attending: INTERNAL MEDICINE
Payer: MEDICARE

## 2022-01-31 DIAGNOSIS — Z12.31 SCREENING MAMMOGRAM, ENCOUNTER FOR: ICD-10-CM

## 2022-01-31 PROCEDURE — 77063 BREAST TOMOSYNTHESIS BI: CPT | Performed by: INTERNAL MEDICINE

## 2022-01-31 PROCEDURE — 77067 SCR MAMMO BI INCL CAD: CPT | Performed by: INTERNAL MEDICINE

## 2022-02-16 NOTE — PATIENT INSTRUCTIONS
Patient:   YESSENIA RIDDLE            MRN: [id number]            FIN: [id number]               Age:   41 years     Sex:  Female     :  1976   Associated Diagnoses:   HTN (hypertension); Family history of heart attack   Author:   Augustin Chatman MD      Visit Information      Date of Service: 2017 04:26 pm  Performing Location: [facility]  Encounter#: [id number]      Chief Complaint   2017 4:34 PM CST    f/up cardiac calcium scan.        History of Present Illness   chief complaint and symptoms as noted above confirmed with patient   Strong FHX heart dis  Borderline BP in pt  former smoker      Review of Systems   Constitutional:  Negative except as documented in history of present illness.    Ear/Nose/Mouth/Throat:  Negative.    Respiratory:  Negative.    Cardiovascular:  Negative.    Gastrointestinal:  Negative.    Genitourinary:  Negative.    Musculoskeletal:  Negative.    Integumentary:  Negative.    Neurologic:  Negative.       Health Status   Allergies:    Allergic Reactions (Selected)  Severity Not Documented  No known allergies (No reactions were documented)  No Known Medication Allergies   Medications:  (Selected)   Prescriptions  Prescribed  Augmentin 875 mg oral tablet: 1 tab(s), PO, q12hr, # 14 tab(s), 0 Refill(s), Type: Maintenance, Pharmacy: [id number], 1 tab(s) po q12 hrs,x7 day(s)  Flonase 50 mcg/inh nasal spray: 2 spray(s), nasal, daily, # 1 EA, 1 Refill(s), Type: Maintenance, Pharmacy: [id number], 2 spray(s) nasal daily  Lipitor 40 mg oral tablet: 1 tab(s) ( 40 mg ), PO, Daily, # 90 tab(s), 1 Refill(s), Type: Maintenance, Pharmacy: [id number], 1 tab(s) po daily  amLODIPine 2.5 mg oral tablet: 1 tab(s) ( 2.5 mg ), PO, Daily, # 90 tab(s), 1 Refill(s), Type: Maintenance, Pharmacy: [id number], 1 tab(s) po daily  predniSONE 10 mg oral tablet: See Instructions, Instructions: 4 tab(s) daily for 3 days, 3 tab(s)  Kimani Matamoros's SCREENING SCHEDULE   Tests on this list are recommended by your physician but may not be covered, or covered at this frequency, by your insurer. Please check with your insurance carrier before scheduling to verify coverage.    PREVENT daily for 3 days, 2 tab(s) daily x3 days, 1 tab daily x3 days, # 30 EA, 0 Refill(s), Type: Maintenance, Pharmacy: [id number], 4 tab(s) daily for 3 days, 3 tab(s) daily...   Problem list:    All Problems (Selected)  Allergic rhinitis, unspecified / SNOMED CT 539803331 / Confirmed  Pain in joint involving lower leg / SNOMED CT 935455186 / Confirmed  Disorders of Sacrum / ICD-9-.6 / Confirmed  Family History of Other Cardiovascular Diseases / ICD-9-CM V17.49 / Confirmed  Family history of heart attack / SNOMED CT 070219789 / Confirmed  Lumbago / SNOMED CT 753143328 / Confirmed  Lumbosacral spondylosis without myelopathy / SNOMED CT 66486760 / Confirmed  Myalgia and Myositis, Unspecified / ICD-9-.1 / Confirmed  Nonspecific Reaction to Test for Tuberculosis / ICD-9-.5 / Confirmed  Obesity / SNOMED CT 9382158480 / Probable  Obesity / SNOMED CT 1719175583 / Probable  Other and Unspecified Hyperlipidemia / ICD-9-.4 / Confirmed  Other Symptoms Referable to Back / ICD-9-.8 / Confirmed  Pain in limb / SNOMED CT 936087804 / Confirmed  Patellar tendinitis / SNOMED CT 57935485 / Confirmed  Pes Anserinus Tendinitis or Bursitis / ICD-9-.61 / Confirmed  Disturbance of skin sensation / SNOMED CT 612341388 / Confirmed  Thoracic or Lumbosacral Neuritis or Radiculitis, Unspecified / ICD-9-.4 / Confirmed  Unspecified Sleep Disturbance / ICD-9-.50 / Confirmed      Histories   Past Medical History:    Active  Lumbosacral spondylosis without myelopathy (85138983): Onset on 1/3/2012 at 35 years.  Pain in joint involving lower leg (050449025): Onset on 1/21/2011 at 34 years.  Disturbance of skin sensation (455962750): Onset on 11/23/2010 at 34 years.  Lumbago (638045465): Onset on 9/22/2010 at 34 years.  Pain in limb (971813135): Onset on 9/7/2010 at 34 years.  Allergic rhinitis, unspecified (535364470)  Patellar tendinitis (94015110)  Resolved  Chronic sinusitis, unspecified  every 10 years- more often if abnormal Colonoscopy due on 02/20/1996 Update [facility] if applicable    Flex Sigmoidoscopy Screen  Covered every 5 years No results found for this or any previous visit. No flowsheet data found.      Fecal Occult Bloo (43890776): Onset on 2010 at 34 years.  Resolved.  Other Disorders of Menstruation and Other Abnormal Bleeding from Female Genital Tract (626.8):  Resolved.  Uterine Prolapse without Mention of Vaginal Wall Prolapse (618.1):  Resolved.  Pregnancy:  Resolved.  Tobacco Use Disorder (305.1):  Resolved.  Pilonidal cyst with abscess (095339532):  Resolved.  Pregnancy (590074793):  Resolved in  at 27 years.  Pregnancy (525576717):  Resolved in  at 28 years.   Family History:    Diabetes mellitus  Mother (Bianka, )  Heart disease  Mother (Bianka, )  Father (Jacobo)  High blood pressure  Father (Jacobo)  Mother (Bianka, )  CA - Cancer  Mother (Bianka, )     Procedure history:    Cardiac computed tomography for calcium scoring (SNOMED CT 9960498423) on 2017 at 41 Years.  Comments:  2017 1:58 PM - Shantel Pizano  Total Agatston calcium score is 8.  THR - Total hip replacement (SNOMED CT 662771347) in  at 38 Years.  Comments:  2017 10:58 AM - Onelia Espino  left  Hysterectomy (SNOMED CT 396324983) in  at 30 Years.   Social History:        Alcohol Assessment            1 drink every 3 months or more.      Tobacco Assessment            Former smoker, quit more than 30 days ago, Cigarettes, Started age 12 Years.  Stopped age 32 Years.      Employment and Education Assessment            Employed, Work/School description: [employer].            Employed, Work/School description: [employer].      Exercise and Physical Activity Assessment            Exercise frequency: 2-3 x per wk..        Physical Examination   Vital Signs   2017 4:36 PM CST Systolic Blood Pressure 157 mmHg  HI    Diastolic Blood Pressure 103 mmHg  HI    Mean Arterial Pressure 121 mmHg   2017 4:34 PM CST Peripheral Pulse Rate 86 bpm    HR Method Electronic    Systolic Blood Pressure 163 mmHg  HI    Diastolic Blood Pressure 100 mmHg  HI    Mean Arterial Pressure 121 mmHg    BP Method Electronic     Pneumococcal 23 (Pneumovax)  Covered Once after 65 No orders found for this or any previous visit. Please get once after your 65th birthday    Hepatitis B for Moderate/High Risk       No orders found for this or any previous visit.  Medium/high risk factors   Measurements from flowsheet : Measurements   12/5/2017 4:34 PM CST    Weight Measured - Standard                181.2 lb     General:  Alert and oriented, No acute distress.    Neck:  Supple, Non-tender.    Respiratory:  Lungs are clear to auscultation, Respirations are non-labored.    Cardiovascular:  Normal rate, Regular rhythm.    Gastrointestinal:  Soft, Non-tender, No organomegaly.    Neurologic:  Alert, Oriented.       Impression and Plan   Diagnosis     HTN (hypertension) (NEP48-AL I10).     Family history of heart attack (KZD33-HU Z82.49).     Course:  Not progressing as expected.    Plan:  start asa 81 and lipitor  start amlodipine  bp monthly]labs  rech 6 mo  reviewed ct 96%.    Patient Instructions:       Counseled: Patient, Regarding diagnosis, Regarding treatment, Regarding medications, Diet, Activity.

## 2022-02-24 RX ORDER — GABAPENTIN 100 MG/1
CAPSULE ORAL
Qty: 90 CAPSULE | Refills: 0 | Status: SHIPPED | OUTPATIENT
Start: 2022-02-24 | End: 2022-03-28

## 2022-03-06 RX ORDER — SERTRALINE HYDROCHLORIDE 25 MG/1
25 TABLET, FILM COATED ORAL DAILY
Qty: 90 TABLET | Refills: 1 | Status: SHIPPED | OUTPATIENT
Start: 2022-03-06 | End: 2022-07-18

## 2022-03-06 NOTE — TELEPHONE ENCOUNTER
Refill passed per [facility] protocol. Requested Prescriptions   Pending Prescriptions Disp Refills    sertraline 25 MG Oral Tab 90 tablet 1     Sig: Take 1 tablet (25 mg total) by mouth daily.         Psychiatric Non-Scheduled (Anti-Anxiety) Passed - 3/6/2022 12:35 PM        Passed - Appointment in last 6 or next 3 months                Recent Outpatient Visits              1 month ago Primary osteoarthritis of right knee    [facility] Madonna uW,     Office Visit    1 month ago Encounter for annual health examination    [facility], [address], [place] Lusher, [place]    Office Visit    3 months ago Essential hypertension    [facility], [address], [place] Lusher, [place]    Office Visit    3 months ago Primary osteoarthritis of knees, bilateral    [facility] Brittney Ambriz,     Office Visit    4 months ago Primary osteoarthritis of right knee    [address] [facility] Madonna Wu,     Office Visit             Future Appointments         Provider Department Appt Notes    Tomorrow Brittney Ambriz, [facility] Ultrasound guided right knee aspiration and injection with corticosteroid

## 2022-03-07 ENCOUNTER — OFFICE VISIT (OUTPATIENT)
Dept: PHYSICAL MEDICINE AND REHAB | Facility: CLINIC | Age: 77
End: 2022-03-07
Payer: COMMERCIAL

## 2022-03-07 DIAGNOSIS — M17.11 PRIMARY OSTEOARTHRITIS OF RIGHT KNEE: Primary | ICD-10-CM

## 2022-03-07 PROCEDURE — 20611 DRAIN/INJ JOINT/BURSA W/US: CPT | Performed by: PHYSICAL MEDICINE & REHABILITATION

## 2022-03-07 NOTE — PATIENT INSTRUCTIONS
Steroid Injection Information  What to expect: The injection contains Lidocaine (which numbs the area) and Kenalog (a steroid which decreases inflammation). You may have pain relief within hours of the injection due to the Lidocaine. The Kenalog can take a couple days, up to a couple weeks, to reach the full effect. It is also possible to have a slight increase in symptoms over the first few days, but that should resolve fairly quickly. How long will the injection last?: The length of response to an injection is variable. Literally a couple weeks to a couple years. The injection will decrease the inflammation and the pain will return if/when the inflammation returns. Activity Recommendations: For the first 24 hours after injection, keep the area clean and dry. It is ok to shower but don't soak in a tub during that time. No vigorous activity such as running or heavy lifting for the first week but other than that you can gradually resume your normal activities immediately. If you have a significant decrease in pain, be careful not to do too much too soon. Again, the key is GRADUAL resumption of activites. Things to look out for: Common injection side effects include soreness at the injection site, bruising, flushing of the face or skin, and a temporary increase in your blood sugars and/or blood pressure. Infection is very rare but please notify my office [facility] for [address] [phone]) if you develop any fevers, drainage from the injection site, or severe increase in pain. If it is the weekend, go to an Emergency Room.       Follow up in four weeks virtually

## 2022-03-08 ENCOUNTER — NURSE TRIAGE (OUTPATIENT)
Dept: INTERNAL MEDICINE CLINIC | Facility: CLINIC | Age: 77
End: 2022-03-08

## 2022-03-08 NOTE — PROCEDURES
Procedure:  Ultrasound guided RIGHT KNEE aspiration and injection with corticosteroid  The risks, benefits and anticipated outcomes of the procedure, the risks and benefits of the alternatives to the procedure, and the roles and tasks of the personnel to be involved, were discussed with the patient, and the patient consents to the procedure and agrees to proceed. UNIVERSAL PROTOCOL / SAFETY CHECKLIST  Sign in Communication: Completed  Time Out:  Team Confirms the Correct Patient, Correct Procedure, Correct Site and Site Marking, Correct Position (if applicable), Prep and Dry Time (if applicable). The procedure was carried out under sterile prep with  with sterile gel. A 27 ga 1&1/4in needle was introduced and advanced with ultrasound guidance for skin anesthesia with 3 cc of 1% lidocaine. Then, again with real time ultrasound guidance, a 18 gauge 1.5 in needle was advanced with the transducer in transverse orientation using an in plane approach from medial to lateral direction where the needle tip was visualized entering the suprapatellar bursa and 12cc of synovial fluid was aspirated. Following aspiration, a mixture of 4 cc of lidocaine and 1 cc of Kenalog (40mg/ml) was injected. Needle was withdrawn without any complications. Permanent pictures were taken and stored in the PACS system. Ultrasound interpretation was performed prior to the procedure to identify the target for injection and any adjacent neurovascular structures. Subsequently, interpretation was performed during real-time needle guidance confirming placement of the needle at the target. Post-intervention interpretation was also performed confirming appropriate injectate flow and hemostasis. The patient tolerated the procedure without complication and was instructed in post-procedure precautions. See patient instructions.     David Lares DO, FAAPMR & CAQSM  Physical Medicine and Rehabilitation/Sports Medicine  [facility] [place]

## 2022-03-11 RX ORDER — SERTRALINE HYDROCHLORIDE 25 MG/1
TABLET, FILM COATED ORAL
Qty: 90 TABLET | Refills: 1 | OUTPATIENT
Start: 2022-03-11

## 2022-03-15 RX ORDER — TRIAMCINOLONE ACETONIDE 40 MG/ML
40 INJECTION, SUSPENSION INTRA-ARTICULAR; INTRAMUSCULAR ONCE
Status: COMPLETED | OUTPATIENT
Start: 2022-03-15 | End: 2022-03-15

## 2022-03-15 RX ORDER — LIDOCAINE HYDROCHLORIDE 10 MG/ML
7 INJECTION, SOLUTION INFILTRATION; PERINEURAL ONCE
Status: COMPLETED | OUTPATIENT
Start: 2022-03-15 | End: 2022-03-15

## 2022-03-15 RX ADMIN — TRIAMCINOLONE ACETONIDE 40 MG: 40 INJECTION, SUSPENSION INTRA-ARTICULAR; INTRAMUSCULAR at 07:23:00

## 2022-03-15 RX ADMIN — LIDOCAINE HYDROCHLORIDE 7 ML: 10 INJECTION, SOLUTION INFILTRATION; PERINEURAL at 07:23:00

## 2022-03-28 RX ORDER — GABAPENTIN 100 MG/1
CAPSULE ORAL
Qty: 90 CAPSULE | Refills: 0 | Status: SHIPPED | OUTPATIENT
Start: 2022-03-28

## 2022-04-05 ENCOUNTER — OFFICE VISIT (OUTPATIENT)
Dept: PHYSICAL MEDICINE AND REHAB | Facility: CLINIC | Age: 77
End: 2022-04-05
Payer: COMMERCIAL

## 2022-04-05 VITALS
DIASTOLIC BLOOD PRESSURE: 62 MMHG | SYSTOLIC BLOOD PRESSURE: 116 MMHG | OXYGEN SATURATION: 96 % | WEIGHT: 270 LBS | HEIGHT: 66 IN | BODY MASS INDEX: 43.39 KG/M2 | HEART RATE: 56 BPM

## 2022-04-05 DIAGNOSIS — K21.9 GASTROESOPHAGEAL REFLUX DISEASE WITHOUT ESOPHAGITIS: ICD-10-CM

## 2022-04-05 DIAGNOSIS — I48.91 ATRIAL FIBRILLATION, UNSPECIFIED TYPE (HCC): ICD-10-CM

## 2022-04-05 DIAGNOSIS — E66.01 OBESITY, MORBID, BMI 40.0-49.9 (HCC): ICD-10-CM

## 2022-04-05 DIAGNOSIS — M17.0 PRIMARY OSTEOARTHRITIS OF KNEES, BILATERAL: Primary | ICD-10-CM

## 2022-04-05 DIAGNOSIS — I10 ESSENTIAL HYPERTENSION: ICD-10-CM

## 2022-04-05 PROCEDURE — 3074F SYST BP LT 130 MM HG: CPT | Performed by: PHYSICAL MEDICINE & REHABILITATION

## 2022-04-05 PROCEDURE — 3008F BODY MASS INDEX DOCD: CPT | Performed by: PHYSICAL MEDICINE & REHABILITATION

## 2022-04-05 PROCEDURE — 3078F DIAST BP <80 MM HG: CPT | Performed by: PHYSICAL MEDICINE & REHABILITATION

## 2022-04-05 PROCEDURE — 99213 OFFICE O/P EST LOW 20 MIN: CPT | Performed by: PHYSICAL MEDICINE & REHABILITATION

## 2022-04-21 ENCOUNTER — TELEPHONE (OUTPATIENT)
Dept: NEUROLOGY | Facility: CLINIC | Age: 77
End: 2022-04-21

## 2022-04-21 NOTE — TELEPHONE ENCOUNTER
Patient requesting repeat facet injections, advised to follow up due to last (virtual) to discuss epidural steroid.     Virtual for 4/22/22

## 2022-04-21 NOTE — TELEPHONE ENCOUNTER
Patient calling to request a call back from [address] as she is trying to schedule on injection.

## 2022-05-16 RX ORDER — GABAPENTIN 100 MG/1
CAPSULE ORAL
Qty: 90 CAPSULE | Refills: 0 | Status: SHIPPED | OUTPATIENT
Start: 2022-05-16

## 2022-05-16 NOTE — TELEPHONE ENCOUNTER
Refill Request    Medication request: GABAPENTIN 100 MG Oral Cap    LOV:4/5/22Judy Wu DO   Due back to clinic per last office note:  RTC PRN  NOV: 7/5/22      [place]: 3/28/22 #90    Urine drug screen (if applicable): None  Pain contract: None    LOV plan (if weaning or changing medications): N/A

## 2022-06-20 RX ORDER — GABAPENTIN 100 MG/1
CAPSULE ORAL
Qty: 90 CAPSULE | Refills: 0 | Status: SHIPPED | OUTPATIENT
Start: 2022-06-20

## 2022-06-20 NOTE — TELEPHONE ENCOUNTER
Refill Request    Medication request: GABAPENTIN 100 MG Oral Cap    LOV:4/5/22 Kavitha Luong,    Due back to clinic per last office note:  PRN  NOV: Visit date not found      [place]: 5/16/22 #90    Urine drug screen (if applicable): None  Pain contract: None    LOV plan (if weaning or changing medications): None

## 2022-06-21 ENCOUNTER — OFFICE VISIT (OUTPATIENT)
Dept: PHYSICAL MEDICINE AND REHAB | Facility: CLINIC | Age: 77
End: 2022-06-21
Payer: COMMERCIAL

## 2022-06-21 ENCOUNTER — TELEPHONE (OUTPATIENT)
Dept: NEUROLOGY | Facility: CLINIC | Age: 77
End: 2022-06-21

## 2022-06-21 VITALS
BODY MASS INDEX: 28.77 KG/M2 | HEIGHT: 66 IN | SYSTOLIC BLOOD PRESSURE: 130 MMHG | OXYGEN SATURATION: 97 % | DIASTOLIC BLOOD PRESSURE: 84 MMHG | WEIGHT: 179 LBS | HEART RATE: 67 BPM

## 2022-06-21 DIAGNOSIS — M47.816 FACET ARTHROPATHY, LUMBAR: Primary | ICD-10-CM

## 2022-06-21 DIAGNOSIS — E66.01 OBESITY, MORBID, BMI 40.0-49.9 (HCC): ICD-10-CM

## 2022-06-21 PROCEDURE — 3079F DIAST BP 80-89 MM HG: CPT | Performed by: PHYSICAL MEDICINE & REHABILITATION

## 2022-06-21 PROCEDURE — 3075F SYST BP GE 130 - 139MM HG: CPT | Performed by: PHYSICAL MEDICINE & REHABILITATION

## 2022-06-21 PROCEDURE — 1125F AMNT PAIN NOTED PAIN PRSNT: CPT | Performed by: PHYSICAL MEDICINE & REHABILITATION

## 2022-06-21 PROCEDURE — 99214 OFFICE O/P EST MOD 30 MIN: CPT | Performed by: PHYSICAL MEDICINE & REHABILITATION

## 2022-06-21 PROCEDURE — 3008F BODY MASS INDEX DOCD: CPT | Performed by: PHYSICAL MEDICINE & REHABILITATION

## 2022-06-21 RX ORDER — DIAZEPAM 10 MG/1
10 TABLET ORAL ONCE
Qty: 1 TABLET | Refills: 0 | Status: SHIPPED | OUTPATIENT
Start: 2022-06-21 | End: 2022-06-21

## 2022-06-21 NOTE — TELEPHONE ENCOUNTER
LMTCB 1st attempt    BMI: 28.89kg/m2: No restrictions for injection    Procedure: Bilateral L3-4, L4-5, L5-S1 Facet joint injection  Anesthesia: Local  Dx: T92.868  Location: [address] [place] [address]  CPT Codes: A0679478, [id number], [id number], 49999

## 2022-06-21 NOTE — TELEPHONE ENCOUNTER
his [facility] members plan does not currently require a prior authorization for these services  Bilateral L3-4, L4-5, L5-S1 Facet joint injection under fluoroscopy  Cpt codes D902803, [id number], [id number], [id number]          Decision ID #:[id number]. Will inform Nursing.

## 2022-06-21 NOTE — PATIENT INSTRUCTIONS
-See Jaci Kocher for weight loss  -Continue Gabapentin 100mg three times daily  -Tylenol and ice as needed  -Valium before injection  -My office will call once injection is approved

## 2022-06-22 NOTE — TELEPHONE ENCOUNTER
Patient has been scheduled for Bilateral L3-4, L4-5, L5-S1 Facet joint injection on 7/11/22 at the [facility] with Dr. Lili Scruggs.   -Anesthesia type: Local.  -If receiving MAC or IVC sedation patient will need to get COVID tested 3 days prior even if already vaccinated (order placed by [facility].)  -If scheduling [facility] [place] covid testing required for all procedures whether patient is vaccinated or not. -Patient informed not to eat or drink anything after midnight the night prior to the procedure, if being sedated. -Patient was advised that if he/she does receive the covid vaccine it needs to be at least 2 weeks before or after the injection. -Medications and allergies reviewed. -Patient reminded to hold NSAIDs (Ibuprofen, ASA 81, Aleve, Naproxen, Mobic etc.) for 3 days prior to [place]  if BMI is greater than 35. For Cervical injections only hold multivitamins, Vitamin E, Fish Oil, Phentermine (Lomaira) for 7 days prior to injection and NSAIDS.  mg to be held for 7 days prior to injections.  -If patient is receiving MAC/IVCS Phentermine Gaylia Few) will need to be held for 7 days prior to injection.  -If on blood thinner clearance has been received to hold this medication by provider.   -Patient informed he/she will need a  to and from procedure. -[facility] is located in the [facility] 1st floor. Patient may park in the yellow parking. Patient verbalized understanding and agrees with plan.  -----> Scheduled in Epic: Yes  -----> Scheduled in [employer]:  Yes

## 2022-07-09 DIAGNOSIS — K21.9 GASTROESOPHAGEAL REFLUX DISEASE WITHOUT ESOPHAGITIS: ICD-10-CM

## 2022-07-09 DIAGNOSIS — I10 ESSENTIAL HYPERTENSION: ICD-10-CM

## 2022-07-09 DIAGNOSIS — M10.9 GOUT, UNSPECIFIED CAUSE, UNSPECIFIED CHRONICITY, UNSPECIFIED SITE: ICD-10-CM

## 2022-07-10 RX ORDER — TRIAMTERENE AND HYDROCHLOROTHIAZIDE 37.5; 25 MG/1; MG/1
CAPSULE ORAL
Qty: 90 CAPSULE | Refills: 1 | Status: SHIPPED | OUTPATIENT
Start: 2022-07-10

## 2022-07-10 RX ORDER — AMLODIPINE BESYLATE 5 MG/1
TABLET ORAL
Qty: 90 TABLET | Refills: 1 | Status: SHIPPED | OUTPATIENT
Start: 2022-07-10

## 2022-07-10 RX ORDER — PANTOPRAZOLE SODIUM 40 MG/1
TABLET, DELAYED RELEASE ORAL
Qty: 90 TABLET | Refills: 3 | Status: SHIPPED | OUTPATIENT
Start: 2022-07-10

## 2022-07-10 RX ORDER — ALLOPURINOL 100 MG/1
TABLET ORAL
Qty: 90 TABLET | Refills: 1 | Status: SHIPPED | OUTPATIENT
Start: 2022-07-10

## 2022-07-11 ENCOUNTER — OFFICE VISIT (OUTPATIENT)
Dept: SURGERY | Facility: CLINIC | Age: 77
End: 2022-07-11

## 2022-07-11 DIAGNOSIS — M47.816 FACET ARTHROPATHY, LUMBAR: Primary | ICD-10-CM

## 2022-07-11 PROCEDURE — 64494 INJ PARAVERT F JNT L/S 2 LEV: CPT | Performed by: PHYSICAL MEDICINE & REHABILITATION

## 2022-07-11 PROCEDURE — 64493 INJ PARAVERT F JNT L/S 1 LEV: CPT | Performed by: PHYSICAL MEDICINE & REHABILITATION

## 2022-07-11 PROCEDURE — 64495 INJ PARAVERT F JNT L/S 3 LEV: CPT | Performed by: PHYSICAL MEDICINE & REHABILITATION

## 2022-07-11 NOTE — PROCEDURES
[facility] Z-JOINT/FACET INJECTIONS  NAME:  John Blanc    MR #:    [id number] :  1945     PHYSICIAN:  Liliana Aleman. Stephanie Blankenship DO        Operative Report    DATE OF PROCEDURE: 2022   PREOPERATIVE DIAGNOSES: 1. Facet arthropathy, lumbar        POSTOPERATIVE DIAGNOSES:   1. Facet arthropathy, lumbar        PROCEDURES: bilateral L3-4, L4-5 and L5-S1 Z-joint/facet injection done under fluoroscopic guidance with contrast enhancement. SURGEON: Liliana Blankenship DO   ANESTHESIA: Local   INDICATIONS:      OPERATIVE PROCEDURE:  Written consent was obtained from the patient. The patient was brought into the operating room and placed in the prone position on the fluoroscopy table with a pillow underneath the abdomen. The patient's skin was cleaned and draped in a normal sterile fashion. Using AP fluoroscopy, all five lumbar vertebrae were identified. Then the bilateral L3-4, L4-5 and L5-S1 z-joints were identified on AP view. At this point in time, the patient's skin was anesthetized with 1 to 2 cc of 1% PF lidocaine without epinephrine over each joint site. Then, 5 inch, 22-gauge spinal needles were inserted and directed towards the bilateral L3-4, L4-5 and L5-S1 z-joints . When it felt to be in good position, bilateral anterior oblique fluoroscopy was used to advance the needle into the correct z-joint. At this point in time, Omnipaque-240 contrast was used to obtain a good athrogram indicating correct needle placement. Then, aspiration was performed. No blood, fluid, or air was aspirated and each joint was injected with a 1 cc solution of 1/2 cc of 40 mg/cc of Kenalog and 1/2 cc of 1% PF lidocaine without epinephrine. After this, the needles were removed. The patient's skin was cleaned. Band-Aids were applied. The patient was transferred to the cart and into [facility]. The patient was given discharge instructions and will follow up in the clinic as scheduled. Throughout the whole procedure, the patient's pulse oximetry and vital signs were monitored and they remained completely stable. Also, throughout the whole procedure, prior to injection of any medication, aspiration was performed. No blood, fluid, or air was aspirated at anytime.     Santana Mcclain DO, FAAPMR & CAQSM  Physical Medicine and Rehabilitation/Sports Medicine  MEDICAL CENTER [facility]

## 2022-07-17 DIAGNOSIS — E78.2 MIXED HYPERLIPIDEMIA: ICD-10-CM

## 2022-07-17 RX ORDER — ATORVASTATIN CALCIUM 40 MG/1
TABLET, FILM COATED ORAL
Qty: 90 TABLET | Refills: 1 | Status: CANCELLED | OUTPATIENT
Start: 2022-07-17

## 2022-07-18 RX ORDER — ATORVASTATIN CALCIUM 40 MG/1
TABLET, FILM COATED ORAL
Qty: 90 TABLET | Refills: 1 | Status: SHIPPED | OUTPATIENT
Start: 2022-07-18

## 2022-07-18 RX ORDER — SERTRALINE HYDROCHLORIDE 25 MG/1
TABLET, FILM COATED ORAL
Qty: 90 TABLET | Refills: 1 | Status: SHIPPED | OUTPATIENT
Start: 2022-07-18

## 2022-07-21 NOTE — TELEPHONE ENCOUNTER
Refill Request    Medication request: GABAPENTIN 100 MG Oral Cap TAKE 1 CAPSULE(100 MG) BY MOUTH THREE TIMES DAILY    LOV: 6/21/22  Due back to clinic per last office note:  as needed  NOV: 8/1/22     [place] 6/20/22 #90    Urine drug screen (if applicable): n/a  Pain contract: none    LOV plan (if weaning or changing medications): No mention of any changes at [address] [place] [address].

## 2022-07-25 RX ORDER — GABAPENTIN 100 MG/1
CAPSULE ORAL
Qty: 90 CAPSULE | Refills: 0 | Status: SHIPPED | OUTPATIENT
Start: 2022-07-25

## 2022-08-26 RX ORDER — GABAPENTIN 100 MG/1
CAPSULE ORAL
Qty: 90 CAPSULE | Refills: 0 | Status: SHIPPED | OUTPATIENT
Start: 2022-08-26

## 2022-08-26 NOTE — TELEPHONE ENCOUNTER
Refill Request    Medication request: GABAPENTIN 100 MG Oral Cap  TAKE 1 CAPSULE(100 MG) BY MOUTH THREE TIMES DAILY    LOV:06/21/22  Due back to clinic per last office note:  PRN  NOV: Visit date not found      [place] 07/25/22 #90    Urine drug screen (if applicable): n/a  Pain contract: none    LOV plan (if weaning or changing medications): n/a

## 2022-09-29 ENCOUNTER — OFFICE VISIT (OUTPATIENT)
Dept: PHYSICAL MEDICINE AND REHAB | Facility: CLINIC | Age: 77
End: 2022-09-29

## 2022-09-29 ENCOUNTER — TELEPHONE (OUTPATIENT)
Dept: NEUROLOGY | Facility: CLINIC | Age: 77
End: 2022-09-29

## 2022-09-29 VITALS — WEIGHT: 279 LBS | HEART RATE: 62 BPM | BODY MASS INDEX: 44.84 KG/M2 | HEIGHT: 66 IN | OXYGEN SATURATION: 96 %

## 2022-09-29 DIAGNOSIS — M48.062 SPINAL STENOSIS OF LUMBAR REGION WITH NEUROGENIC CLAUDICATION: Primary | ICD-10-CM

## 2022-09-29 DIAGNOSIS — M47.816 FACET ARTHROPATHY, LUMBAR: ICD-10-CM

## 2022-09-29 PROCEDURE — 1125F AMNT PAIN NOTED PAIN PRSNT: CPT | Performed by: PHYSICAL MEDICINE & REHABILITATION

## 2022-09-29 PROCEDURE — 99214 OFFICE O/P EST MOD 30 MIN: CPT | Performed by: PHYSICAL MEDICINE & REHABILITATION

## 2022-09-29 PROCEDURE — 3008F BODY MASS INDEX DOCD: CPT | Performed by: PHYSICAL MEDICINE & REHABILITATION

## 2022-09-29 RX ORDER — DIAZEPAM 10 MG/1
10 TABLET ORAL ONCE
Qty: 1 TABLET | Refills: 0 | Status: SHIPPED | OUTPATIENT
Start: 2022-09-29 | End: 2022-09-29

## 2022-09-29 NOTE — TELEPHONE ENCOUNTER
Status of Anticoag  [x] Clearance pending to hold Eliquis 5mg to hold for 2/3 days prior to Caudal epidural steroid injection to Dr. Arun Leahy   F: [phone]  [] Clearance approved  [] Clearance denied

## 2022-09-29 NOTE — TELEPHONE ENCOUNTER
Per Medicare guidelines authorization is not required for  Caudal Epidural Steroid Injection under fluoroscopy  cpt codes  08711, 73041. Will inform Nursing.

## 2022-09-29 NOTE — TELEPHONE ENCOUNTER
Medication request: Gabapentin 100 mg oral cap. Take 1 capsule by mouth 3 times daily. #90. No refills. RS  NOV:None follow up as needed.      [place] 2022

## 2022-09-30 RX ORDER — GABAPENTIN 100 MG/1
CAPSULE ORAL
Qty: 90 CAPSULE | Refills: 0 | Status: SHIPPED | OUTPATIENT
Start: 2022-09-30

## 2022-10-06 ENCOUNTER — TELEPHONE (OUTPATIENT)
Dept: PHYSICAL MEDICINE AND REHAB | Facility: CLINIC | Age: 77
End: 2022-10-06

## 2022-10-06 NOTE — TELEPHONE ENCOUNTER
Patient has been scheduled for caudal steroid epidural injection on 10/17/22 at the [facility] with .   -Anesthesia type: Local.  -If scheduling [facility] covid testing required for all procedures whether patient is vaccinated or not. -Patient informed not to eat or drink anything after midnight the night prior to the procedure, if being sedated. -Patient was advised that if he/she does receive the covid vaccine it needs to be at least 2 weeks before or after the injection. -Medications and allergies reviewed. -Patient reminded to hold NSAIDs (Ibuprofen, ASA 81, Aleve, Naproxen, Mobic, Diclofenac, Etodolac, Celebrex etc.) for 3 days prior to [facility]  if BMI is greater than 35. For Cervical injections only hold multivitamins, Vitamin E, Fish Oil, Phentermine (Lomaira) for 7 days prior to injection and NSAIDS.  mg to be held for 7 days prior to injections.  -If patient is receiving MAC/IVCS Phentermine Gaylia Few) will need to be held for 7 days prior to injection.  -If on blood thinner clearance has been received to hold this medication by provider.   -Patient informed he/she will need a  to and from procedure. -[facility] is located in the [facility] 1st floor. Patient may park in the yellow parking. Patient verbalized understanding and agrees with plan.  -----> Scheduled in Epic: Yes  -----> Scheduled in Caset[employer]:  Yes

## 2022-10-17 ENCOUNTER — OFFICE VISIT (OUTPATIENT)
Dept: SURGERY | Facility: CLINIC | Age: 77
End: 2022-10-17

## 2022-10-17 DIAGNOSIS — M48.062 SPINAL STENOSIS OF LUMBAR REGION WITH NEUROGENIC CLAUDICATION: Primary | ICD-10-CM

## 2022-10-17 PROCEDURE — 62323 NJX INTERLAMINAR LMBR/SAC: CPT | Performed by: PHYSICAL MEDICINE & REHABILITATION

## 2022-10-17 NOTE — PROCEDURES
Caudal Epidural steroid injection under fluoroscopy    Dx: lumbar radiculopathy    Description: Risks and benefits discussed, patient agreed to proceed. Informed consent obtained prior to procedure. The patient was positioned prone on the fluoroscopy table with a pillow placed underneath the lower abdomen. Noninvasive monitors including pulse oximeter, blood pressure cuff and EKG leads were placed. A sterile prep with chloroprep and drape at the site of injection was performed. The C arm was oriented to see the lateral view. Landmarks were identifuied using fluoroscopy. Next, using lidocaine 1% solution the soft tissue was infiltrated at the site of intended needle puncture. A 22 gauge Quincke spinal needle was introduced into the caudal canal using fluoroscopic guidance. AP/lateral fluoroscopy was used to verify positioning. The stylet was removed and after negative aspiration for blood and CSF radiopaque dye was injected which demonstrated epidural spread without vascular spread. Subsequently, 15cc of a combination of 3cc of 6mg/cc of Celestone, 7mL of normal saline, and 5cc of 1% Lidocaine was injected after negative aspiration. The needle was then withdrawn. Recovery: Patient was monitored in recovery with regular VS, pulse ox monitoring. Patent was moving all extremities and able to ambulate, and patient was given discharge instructions and discharge in care of a family member/friend in stable condition. Follow up in 2 weeks for post-procedure evaluation.     Carlos Hinkle DO, FAAPMR & CAQSM  Physical Medicine and Rehabilitation/Sports Medicine  MEDICAL CENTER [facility]

## 2022-10-27 ENCOUNTER — TELEPHONE (OUTPATIENT)
Dept: NEUROLOGY | Facility: CLINIC | Age: 77
End: 2022-10-27

## 2022-10-28 RX ORDER — CYCLOBENZAPRINE HCL 10 MG
10 TABLET ORAL NIGHTLY PRN
Qty: 30 TABLET | Refills: 0 | Status: SHIPPED | OUTPATIENT
Start: 2022-10-28

## 2022-10-28 NOTE — TELEPHONE ENCOUNTER
Please advise patient I have ordered muscle relaxer to her pharmacy to be taken at nighttime as needed.     Adiel Calero DO, FAAPMR & CAQSM  Physical Medicine and Rehabilitation/Sports Medicine  [facility]

## 2022-10-28 NOTE — TELEPHONE ENCOUNTER
Spoke with patient post Caudal epidural steroid injection states she's showed improvement for a few days and pain has returned. C/o bilateral low back that radiates to glutes, denies N/T. States she's currently having muscle spasms and stiffness, would like a muscle relaxer. Has had Cyclobenzaprine in the past with improvement. Please advised. PHARMACY VERIFIED.

## 2022-11-07 ENCOUNTER — OFFICE VISIT (OUTPATIENT)
Dept: PHYSICAL MEDICINE AND REHAB | Facility: CLINIC | Age: 77
End: 2022-11-07
Payer: COMMERCIAL

## 2022-11-07 VITALS — BODY MASS INDEX: 44.84 KG/M2 | OXYGEN SATURATION: 99 % | WEIGHT: 279 LBS | HEART RATE: 69 BPM | HEIGHT: 66 IN

## 2022-11-07 DIAGNOSIS — M48.062 SPINAL STENOSIS OF LUMBAR REGION WITH NEUROGENIC CLAUDICATION: Primary | ICD-10-CM

## 2022-11-07 PROCEDURE — 1125F AMNT PAIN NOTED PAIN PRSNT: CPT | Performed by: PHYSICAL MEDICINE & REHABILITATION

## 2022-11-07 PROCEDURE — 3008F BODY MASS INDEX DOCD: CPT | Performed by: PHYSICAL MEDICINE & REHABILITATION

## 2022-11-07 PROCEDURE — 99214 OFFICE O/P EST MOD 30 MIN: CPT | Performed by: PHYSICAL MEDICINE & REHABILITATION

## 2022-11-07 RX ORDER — GABAPENTIN 100 MG/1
100 CAPSULE ORAL 3 TIMES DAILY
Qty: 90 CAPSULE | Refills: 3 | Status: SHIPPED | OUTPATIENT
Start: 2022-11-07

## 2022-12-06 ENCOUNTER — TELEPHONE (OUTPATIENT)
Dept: PHYSICAL MEDICINE AND REHAB | Facility: CLINIC | Age: 77
End: 2022-12-06

## 2022-12-08 NOTE — TELEPHONE ENCOUNTER
Location of Pain: RIGHT Knee  Old or new pain: old  Date Pain Began: Chronic; patient had RIGHT KNEE CSI 04/05/2022 w/ 80% relief. If the following symptoms are identified route high priority and verbally notify provider: denies any     Numeric Rating Scale:  Pain at Present:  9                                                                                                            (No Pain) 0  to  10 (Worst Pain)                 Minimum Pain:   8  Maximum Pain  10    Distribution of Pain:    RIGHT knee pain that radiates up to RIGHT hip/glute  Quality of Pain:   aching  Aggravating Factors: Other Pain worse in the AM, worse with bending  Current pain treatment: OTC medications - Tylenol ES Arthritis 2 Tablets Q8 w/ slight short lived relief; Gabapentin TID    - Pt has never tried her Cyclobenzaprine PRN for the pain at this time. This RN advised patient to try the flexeril for the pain as needed but since this medication has never been tried - patient to try and take it at night if needed, was educated on side effects of drowsiness/dizziness and educated to avoid driving before effects of medication known. Gerald Rodriguez DO   NOV: 2/7/2023 Carrillo Fraser DO     Summary of patient request: Patient stating that Dr. Stephanie Blankenship mentioned doing a Rozanna Barrette block\" and would discuss this with patient at her [address]. Pt stating she will be unable to wait that long d/t pain being so severe and wants to know what she can do for the pain in the meantime.

## 2022-12-19 ENCOUNTER — TELEPHONE (OUTPATIENT)
Dept: PHYSICAL MEDICINE AND REHAB | Facility: CLINIC | Age: 77
End: 2022-12-19

## 2022-12-19 ENCOUNTER — OFFICE VISIT (OUTPATIENT)
Dept: PHYSICAL MEDICINE AND REHAB | Facility: CLINIC | Age: 77
End: 2022-12-19
Payer: COMMERCIAL

## 2022-12-19 VITALS
BODY MASS INDEX: 44.84 KG/M2 | SYSTOLIC BLOOD PRESSURE: 122 MMHG | DIASTOLIC BLOOD PRESSURE: 78 MMHG | HEIGHT: 66 IN | WEIGHT: 279 LBS

## 2022-12-19 DIAGNOSIS — M17.11 PRIMARY OSTEOARTHRITIS OF RIGHT KNEE: Primary | ICD-10-CM

## 2022-12-19 RX ORDER — ACETAMINOPHEN 500 MG
1 TABLET ORAL DAILY
COMMUNITY
Start: 2021-10-06

## 2022-12-19 RX ORDER — LIDOCAINE HYDROCHLORIDE 10 MG/ML
4 INJECTION, SOLUTION INFILTRATION; PERINEURAL ONCE
Status: COMPLETED | OUTPATIENT
Start: 2022-12-19 | End: 2022-12-19

## 2022-12-19 RX ORDER — TRIAMCINOLONE ACETONIDE 40 MG/ML
40 INJECTION, SUSPENSION INTRA-ARTICULAR; INTRAMUSCULAR ONCE
Status: COMPLETED | OUTPATIENT
Start: 2022-12-19 | End: 2022-12-19

## 2022-12-19 RX ORDER — LIDOCAINE HYDROCHLORIDE 10 MG/ML
3 INJECTION, SOLUTION INFILTRATION; PERINEURAL ONCE
Status: COMPLETED | OUTPATIENT
Start: 2022-12-19 | End: 2022-12-19

## 2022-12-19 NOTE — PATIENT INSTRUCTIONS
Steroid Injection Information  What to expect: The injection contains Lidocaine (which numbs the area) and Kenalog (a steroid which decreases inflammation). You may have pain relief within hours of the injection due to the Lidocaine. The Kenalog can take a couple days, up to a couple weeks, to reach the full effect. It is also possible to have a slight increase in symptoms over the first few days, but that should resolve fairly quickly. How long will the injection last?: The length of response to an injection is variable. Literally a couple weeks to a couple years. The injection will decrease the inflammation and the pain will return if/when the inflammation returns. Activity Recommendations: For the first 24 hours after injection, keep the area clean and dry. It is ok to shower but don't soak in a tub during that time. No vigorous activity such as running or heavy lifting for the first week but other than that you can gradually resume your normal activities immediately. If you have a significant decrease in pain, be careful not to do too much too soon. Again, the key is GRADUAL resumption of activites. Things to look out for: Common injection side effects include soreness at the injection site, bruising, flushing of the face or skin, and a temporary increase in your blood sugars and/or blood pressure. Infection is very rare but please notify my office [facility] for [address] [place] [phone]) if you develop any fevers, drainage from the injection site, or severe increase in pain. If it is the weekend, go to an Emergency Room.       3 months in office

## 2022-12-19 NOTE — TELEPHONE ENCOUNTER
Initiated authorization for Ultrasound guided right knee aspiration and injection with corticosteroid CPT 37900+ with [facility]  Status: Approved valid 12/19/22-12/31/22    Notification or Prior Authorization is not required for the requested services    This [facility] plan does not currently require a prior authorization for these services. If you have general questions about the prior authorization requirements, please call us at [phone] or visit "[employer]" > Clinician Resources > Advance and Admission Notification Requirements. The number above acknowledges your notification. Please write this number down for future reference. Notification is not a guarantee of coverage or payment.     Decision ID #:[id number]

## 2022-12-20 NOTE — TELEPHONE ENCOUNTER
Pt was seen in office 12/19/2022 with Dr. Virginia Hinojosa. Nothing further needed from this encounter at this time.

## 2022-12-27 ENCOUNTER — TELEPHONE (OUTPATIENT)
Dept: NEUROLOGY | Facility: CLINIC | Age: 77
End: 2022-12-27

## 2022-12-27 DIAGNOSIS — M48.062 SPINAL STENOSIS OF LUMBAR REGION WITH NEUROGENIC CLAUDICATION: Primary | ICD-10-CM

## 2022-12-27 NOTE — TELEPHONE ENCOUNTER
Patient calling to provide condition update a week after her R-Knee injection on 12/20/22, the pain is on the R-side of the leg and the [id number] MG 3 X per day is not helping with the pain, calling to see if she can get something else for the pain and or advice.

## 2022-12-28 NOTE — TELEPHONE ENCOUNTER
Patient had Ultrasound guided right knee aspiration and injection with corticosteroid on 12/19/22 with . Per patient injection helped her knee pain about 75%. States she is having numbness to the outside of her knee and is also having aching pain to the right leg. States this started a little before [place]. States she has also noticed more low back pain. Knee pain 7/10, but worsens at night to 9/10. Low back pain 7/10 while moving, but decreases to 1-2/10 when sitting. She is still taking Gabapentin 100 mg TID and tylenol OTC and has tried ice/heat, but these are not helping the pain much. LOV: 11/7/22  NOV: 2/7/23    Patient asking for other recommendations to help manage her pain. Informed patient update will be relayed to . As soon as recommendations are received, will reach back out to patient. Patient understood and was appreciative.

## 2022-12-29 NOTE — TELEPHONE ENCOUNTER
Attempted to reach patient, but call went directly to . Left a detailed message for patient regarding below recommendations. Instructed patient to call office back with any questions or if she needs a refill and to also update our office in 1 week. Message also sent to patient via [employer].

## 2022-12-29 NOTE — TELEPHONE ENCOUNTER
Per : \"sounds more radicular from the lumbar spine\" \"I would recommended increasing the gabapentin to 300mg three times daily\"  \"if not better, tell her to come in the office and we can discuss\"

## 2022-12-29 NOTE — TELEPHONE ENCOUNTER
Patient called back today asking for an update. Informed patient awaiting recommendations from [address] [place] [address] Drive. Will follow up with  and reach back out to patient. Patient understood. Message sent to  to f/u on below patient update.

## 2022-12-30 RX ORDER — GABAPENTIN 100 MG/1
200 CAPSULE ORAL 3 TIMES DAILY
Qty: 180 CAPSULE | Refills: 0 | Status: SHIPPED | OUTPATIENT
Start: 2022-12-30

## 2022-12-30 NOTE — TELEPHONE ENCOUNTER
Per Dr. Vargas Landis - patient is OK to take 200MG TID if tolerated. Will place order to reflect new instructions and send to patient's preferred pharmacy. Left detailed message for patient reflecting this.

## 2022-12-30 NOTE — TELEPHONE ENCOUNTER
Patient calling stating she is concerned about taking 300MG of Gabapentin d/t trying this dose in the past as per Dr. Jewel Zaragoza recommendations (03/10/21) and that dose \"making me sick\"    Pt inquiring about possibly taking 200MG instead of 300MG. This RN informed patient that I would inquire with Dr. Lili Scruggs about patient's request/wish to try and take 200MG TID instead d/t previous side effects. Note: pt stating she has about 15 tablets left of her previous order.      Will reach out to Dr. Lili Scruggs in regards to pts request.

## 2022-12-31 NOTE — PROCEDURES
Procedure:  Ultrasound guided RIGHT KNEE aspiration and injection with corticosteroid  The risks, benefits and anticipated outcomes of the procedure, the risks and benefits of the alternatives to the procedure, and the roles and tasks of the personnel to be involved, were discussed with the patient, and the patient consents to the procedure and agrees to proceed. UNIVERSAL PROTOCOL / SAFETY CHECKLIST  Sign in Communication: Completed  Time Out:  Team Confirms the Correct Patient, Correct Procedure, Correct Site and Site Marking, Correct Position (if applicable), Prep and Dry Time (if applicable). The procedure was carried out under sterile prep with  with sterile gel. A 27 ga 1&1/4in needle was introduced and advanced with ultrasound guidance for skin anesthesia with 3 cc of 1% lidocaine. Then, again with real time ultrasound guidance, a 18 gauge 1.5 in needle was advanced with the transducer in transverse orientation using an in plane approach from medial to lateral direction where the needle tip was visualized entering the suprapatellar bursa and 20cc of synovial fluid was aspirated. Following aspiration, a mixture of 4 cc of lidocaine and 1 cc of Kenalog (40mg/ml) was injected. Needle was withdrawn without any complications. Permanent pictures were taken and stored in the PACS system. Ultrasound interpretation was performed prior to the procedure to identify the target for injection and any adjacent neurovascular structures. Subsequently, interpretation was performed during real-time needle guidance confirming placement of the needle at the target. Post-intervention interpretation was also performed confirming appropriate injectate flow and hemostasis. The patient tolerated the procedure without complication and was instructed in post-procedure precautions. See patient instructions.     Pablo Dumont DO, FAAPMR & CAQSM  Physical Medicine and Rehabilitation/Sports Medicine  [facility]

## 2023-01-05 DIAGNOSIS — M10.9 GOUT, UNSPECIFIED CAUSE, UNSPECIFIED CHRONICITY, UNSPECIFIED SITE: ICD-10-CM

## 2023-01-05 DIAGNOSIS — I10 ESSENTIAL HYPERTENSION: ICD-10-CM

## 2023-01-05 RX ORDER — AMLODIPINE BESYLATE 5 MG/1
TABLET ORAL
Qty: 90 TABLET | Refills: 1 | Status: SHIPPED | OUTPATIENT
Start: 2023-01-05

## 2023-01-05 RX ORDER — TRIAMTERENE AND HYDROCHLOROTHIAZIDE 37.5; 25 MG/1; MG/1
CAPSULE ORAL
Qty: 90 CAPSULE | Refills: 1 | Status: SHIPPED | OUTPATIENT
Start: 2023-01-05

## 2023-01-05 RX ORDER — ALLOPURINOL 100 MG/1
TABLET ORAL
Qty: 90 TABLET | Refills: 1 | Status: SHIPPED | OUTPATIENT
Start: 2023-01-05

## 2023-01-11 ENCOUNTER — OFFICE VISIT (OUTPATIENT)
Dept: PHYSICAL MEDICINE AND REHAB | Facility: CLINIC | Age: 78
End: 2023-01-11
Payer: COMMERCIAL

## 2023-01-11 ENCOUNTER — TELEPHONE (OUTPATIENT)
Dept: PHYSICAL MEDICINE AND REHAB | Facility: CLINIC | Age: 78
End: 2023-01-11

## 2023-01-11 VITALS
BODY MASS INDEX: 45 KG/M2 | HEIGHT: 66 IN | WEIGHT: 280 LBS | SYSTOLIC BLOOD PRESSURE: 124 MMHG | HEART RATE: 90 BPM | DIASTOLIC BLOOD PRESSURE: 68 MMHG | OXYGEN SATURATION: 95 %

## 2023-01-11 DIAGNOSIS — M17.11 PRIMARY OSTEOARTHRITIS OF RIGHT KNEE: Primary | ICD-10-CM

## 2023-01-11 DIAGNOSIS — M48.062 SPINAL STENOSIS OF LUMBAR REGION WITH NEUROGENIC CLAUDICATION: ICD-10-CM

## 2023-01-11 PROCEDURE — 99214 OFFICE O/P EST MOD 30 MIN: CPT | Performed by: PHYSICAL MEDICINE & REHABILITATION

## 2023-01-11 PROCEDURE — 3078F DIAST BP <80 MM HG: CPT | Performed by: PHYSICAL MEDICINE & REHABILITATION

## 2023-01-11 PROCEDURE — 3074F SYST BP LT 130 MM HG: CPT | Performed by: PHYSICAL MEDICINE & REHABILITATION

## 2023-01-11 PROCEDURE — 3008F BODY MASS INDEX DOCD: CPT | Performed by: PHYSICAL MEDICINE & REHABILITATION

## 2023-01-11 RX ORDER — GABAPENTIN 300 MG/1
CAPSULE ORAL
Qty: 90 CAPSULE | Refills: 0 | Status: SHIPPED | OUTPATIENT
Start: 2023-01-11

## 2023-01-11 RX ORDER — DIAZEPAM 10 MG/1
TABLET ORAL
Qty: 1 TABLET | Refills: 0 | Status: SHIPPED | OUTPATIENT
Start: 2023-01-11

## 2023-01-11 NOTE — TELEPHONE ENCOUNTER
Dr. Donald Grove changed the order to Right L4 and L5 Transforaminal Epidural Steroid Injection under fluoroscopy guidance     Initiated new authorization for Right L4 and L5 Transforaminal Epidural Steroid Injection under fluoroscopy guidance via [facility]  Status:Approved valid 1/16/23-4/16/23  Notification or Prior Authorization is not required for the requested services    This [facility] plan does not currently require a prior authorization for these services. If you have general questions about the prior authorization requirements, please call us at [phone] or visit [employer] > Clinician Resources > Advance and Admission Notification Requirements. The number above acknowledges your notification. Please write this number down for future reference. Notification is not a guarantee of coverage or payment.     Decision ID #:[id number]

## 2023-01-11 NOTE — TELEPHONE ENCOUNTER
Initiated authorization for Caudal Epidural Steroid Injection under fluoroscopy guidance CPT 85884 dx:M48.062 to be done at [facility] with [facility]  Status: Approved valid 1/11/23-4/11/23    Notification or Prior Authorization is not required for the requested services    This [facility] plan does not currently require a prior authorization for these services. If you have general questions about the prior authorization requirements, please call us at [phone] or visit [employer] > Clinician Resources > Advance and Admission Notification Requirements. The number above acknowledges your notification. Please write this number down for future reference. Notification is not a guarantee of coverage or payment.     Decision ID #:[id number]

## 2023-01-18 NOTE — TELEPHONE ENCOUNTER
Status of Anticoag  [x] Clearance pending to hold Eliquis 2/3 days prior to injection.   Faxed to Dr. Sheena Councilman F: [phone]  [] Clearance approved  [] Clearance denied

## 2023-01-19 NOTE — Clinical Note
Patient is aware of all surgery and has no questions at this time.     DL 01/19/2023    Please schedule patient at [facility] once approved

## 2023-01-24 NOTE — TELEPHONE ENCOUNTER
Patient has been scheduled for Caudal Epidural Steroid Injection under fluoroscopy guidance  on 01/30/23 at the [facility] with Melanie Salvador. -Anesthesia type: LOCAL. -If scheduling [address] covid testing required for all procedures whether patient is vaccinated or not. -Patient informed not to eat or drink anything after midnight the night prior to the procedure, if being sedated. (Patient informed to fast 12 hours prior to procedure with IVCS/MAC. )  -Patient was advised that if he/she does receive the covid vaccine it needs to be at least 2 weeks before or after the injection. -Medications and allergies reviewed. -Patient reminded to hold NSAIDs (Ibuprofen, ASA 81, Aleve, Naproxen, Mobic, Diclofenac, Etodolac, Celebrex etc.) for 3 days prior to [place]  if BMI is greater than 35. For Cervical injections only hold multivitamins, Vitamin E, Fish Oil, Phentermine (Lomaira) for 7 days prior to injection and NSAIDS.  mg to be held for 7 days prior to injections.  -If patient is receiving MAC/IVCS Phentermine Junie Cuna) will need to be held for 7 days prior to injection.  -If on blood thinner clearance has been received to hold this medication by provider.   -Patient informed he/she will need a  to and from procedure. -[facility] is located in the [facility] 1st floor. Patient may park in the yellow or purple parking. Patient verbalized understanding and agrees with plan.  -----> Scheduled in Epic: Yes  -----> Scheduled in [employer]:  Yes

## 2023-01-30 ENCOUNTER — OFFICE VISIT (OUTPATIENT)
Dept: SURGERY | Facility: CLINIC | Age: 78
End: 2023-01-30
Payer: COMMERCIAL

## 2023-01-30 DIAGNOSIS — M48.062 SPINAL STENOSIS OF LUMBAR REGION WITH NEUROGENIC CLAUDICATION: Primary | ICD-10-CM

## 2023-01-30 PROCEDURE — 62323 NJX INTERLAMINAR LMBR/SAC: CPT | Performed by: PHYSICAL MEDICINE & REHABILITATION

## 2023-01-30 NOTE — PROCEDURES
Caudal Epidural steroid injection under fluoroscopy    Dx: lumbar radiculopathy    Description: Risks and benefits discussed, patient agreed to proceed. Informed consent obtained prior to procedure. The patient was positioned prone on the fluoroscopy table with a pillow placed underneath the lower abdomen. Noninvasive monitors including pulse oximeter, blood pressure cuff and EKG leads were placed. A sterile prep with chloroprep and drape at the site of injection was performed. The C arm was oriented to see the lateral view. Landmarks were identifuied using fluoroscopy. Next, using lidocaine 1% solution the soft tissue was infiltrated at the site of intended needle puncture. A 22 gauge Quincke spinal needle was introduced into the caudal canal using fluoroscopic guidance. AP/lateral fluoroscopy was used to verify positioning. The stylet was removed and after negative aspiration for blood and CSF radiopaque dye was injected which demonstrated epidural spread without vascular spread. Subsequently, 15cc of a solution containing 5cc of 1%Lidocaine, 7cc of normal saline and 3cc of 6mg/ml of Celestone was injected. The needle was then withdrawn. Recovery: Patient was monitored in recovery with regular VS, pulse ox monitoring. Patent was moving all extremities and able to ambulate, and patient was given discharge instructions and discharge in care of a family member/friend in stable condition. Follow up in 2 weeks for post-procedure evaluation.     Kina Chu DO, FAAPMR & CAQSM  Physical Medicine and Rehabilitation/Sports Medicine  [facility] [place]

## 2023-02-09 RX ORDER — CYCLOBENZAPRINE HCL 10 MG
TABLET ORAL
Qty: 30 TABLET | Refills: 0 | Status: SHIPPED | OUTPATIENT
Start: 2023-02-09

## 2023-02-09 RX ORDER — GABAPENTIN 300 MG/1
300 CAPSULE ORAL 3 TIMES DAILY
Qty: 90 CAPSULE | Refills: 0 | Status: SHIPPED | OUTPATIENT
Start: 2023-02-09

## 2023-02-09 NOTE — TELEPHONE ENCOUNTER
Refill Request    Medication request: gabapentin 300 MG Oral Cap. Start with night time dose only. If well tolerated, increase to two times daily. If well tolerated, increase to three times daily. REU:1/12/3020 (& 01/30/2023 at [facility]) with Ashok Chase DO   Due back to clinic per last office note: f/u post procedure  NOV: 3/16/2023 Ashok Chase DO      [place]: 01/11/2023 #90    Urine drug screen (if applicable): n/a  Pain contract: n/a    LOV plan (if weaning or changing medications): Per Dr. Sushil Will: \"Gabapentin 300mg three times daily. \"        Medication request: cyclobenzaprine 10 MG Oral Tab. Take 1 tablet (10 mg total) by mouth nightly as needed for Muscle spasms. [place]: 10/28/2022 #30    LOV plan (if weaning or changing medications): No mention of cyclobenzaprine in LOV note.

## 2023-03-08 ENCOUNTER — OFFICE VISIT (OUTPATIENT)
Dept: INTERNAL MEDICINE CLINIC | Facility: CLINIC | Age: 78
End: 2023-03-08

## 2023-03-08 VITALS
HEIGHT: 66 IN | SYSTOLIC BLOOD PRESSURE: 136 MMHG | DIASTOLIC BLOOD PRESSURE: 72 MMHG | BODY MASS INDEX: 42.37 KG/M2 | OXYGEN SATURATION: 97 % | RESPIRATION RATE: 20 BRPM | HEART RATE: 94 BPM | WEIGHT: 263.63 LBS

## 2023-03-08 DIAGNOSIS — N18.30 STAGE 3 CHRONIC KIDNEY DISEASE, UNSPECIFIED WHETHER STAGE 3A OR 3B CKD (HCC): ICD-10-CM

## 2023-03-08 DIAGNOSIS — Z00.00 ENCOUNTER FOR ANNUAL HEALTH EXAMINATION: Primary | ICD-10-CM

## 2023-03-08 DIAGNOSIS — E78.2 MIXED HYPERLIPIDEMIA: ICD-10-CM

## 2023-03-08 DIAGNOSIS — I70.0 ATHEROSCLEROSIS OF AORTA (HCC): ICD-10-CM

## 2023-03-08 DIAGNOSIS — F32.4 MAJOR DEPRESSIVE DISORDER WITH SINGLE EPISODE, IN PARTIAL REMISSION (HCC): ICD-10-CM

## 2023-03-08 DIAGNOSIS — G91.2 (IDIOPATHIC) NORMAL PRESSURE HYDROCEPHALUS (HCC): ICD-10-CM

## 2023-03-08 DIAGNOSIS — M48.062 SPINAL STENOSIS OF LUMBAR REGION WITH NEUROGENIC CLAUDICATION: ICD-10-CM

## 2023-03-08 DIAGNOSIS — I48.91 ATRIAL FIBRILLATION, UNSPECIFIED TYPE (HCC): ICD-10-CM

## 2023-03-08 DIAGNOSIS — M10.9 GOUT, UNSPECIFIED CAUSE, UNSPECIFIED CHRONICITY, UNSPECIFIED SITE: ICD-10-CM

## 2023-03-08 DIAGNOSIS — I10 ESSENTIAL HYPERTENSION: ICD-10-CM

## 2023-03-08 DIAGNOSIS — E66.01 OBESITY, MORBID, BMI 40.0-49.9 (HCC): ICD-10-CM

## 2023-03-08 DIAGNOSIS — G56.03 BILATERAL CARPAL TUNNEL SYNDROME: ICD-10-CM

## 2023-03-08 DIAGNOSIS — Z91.89 AT RISK FOR DECREASED BONE DENSITY: ICD-10-CM

## 2023-03-08 DIAGNOSIS — M17.0 PRIMARY OSTEOARTHRITIS OF KNEES, BILATERAL: ICD-10-CM

## 2023-03-08 DIAGNOSIS — F13.20 SEDATIVE, HYPNOTIC OR ANXIOLYTIC DEPENDENCE, UNCOMPLICATED (HCC): ICD-10-CM

## 2023-03-08 DIAGNOSIS — Z23 NEED FOR VACCINATION: ICD-10-CM

## 2023-03-08 DIAGNOSIS — F41.9 ANXIETY: ICD-10-CM

## 2023-03-08 DIAGNOSIS — Z78.0 ASYMPTOMATIC MENOPAUSAL STATE: ICD-10-CM

## 2023-03-08 DIAGNOSIS — D69.2 SENILE PURPURA (HCC): ICD-10-CM

## 2023-03-08 DIAGNOSIS — M47.816 FACET ARTHROPATHY, LUMBAR: ICD-10-CM

## 2023-03-08 DIAGNOSIS — K21.9 GASTROESOPHAGEAL REFLUX DISEASE WITHOUT ESOPHAGITIS: ICD-10-CM

## 2023-03-08 DIAGNOSIS — E79.0 ELEVATED BLOOD URIC ACID LEVEL: ICD-10-CM

## 2023-03-08 DIAGNOSIS — R73.03 BORDERLINE DIABETES: ICD-10-CM

## 2023-03-08 DIAGNOSIS — M46.96 INFLAMMATORY SPONDYLOPATHY OF LUMBAR REGION (HCC): ICD-10-CM

## 2023-03-08 PROBLEM — F32.0 CURRENT MILD EPISODE OF MAJOR DEPRESSIVE DISORDER, UNSPECIFIED WHETHER RECURRENT (HCC): Status: RESOLVED | Noted: 2021-03-17 | Resolved: 2023-03-08

## 2023-03-08 PROBLEM — F32.0 CURRENT MILD EPISODE OF MAJOR DEPRESSIVE DISORDER, UNSPECIFIED WHETHER RECURRENT: Status: RESOLVED | Noted: 2021-03-17 | Resolved: 2023-03-08

## 2023-03-08 PROBLEM — M47.819 ARTHRITIS OF LOW BACK: Status: RESOLVED | Noted: 2019-06-03 | Resolved: 2023-03-08

## 2023-03-08 PROBLEM — M17.11 PRIMARY OSTEOARTHRITIS OF RIGHT KNEE: Status: RESOLVED | Noted: 2021-10-25 | Resolved: 2023-03-08

## 2023-03-08 PROBLEM — H02.886 MEIBOMIAN GLAND DYSFUNCTION (MGD) OF BOTH EYES: Status: RESOLVED | Noted: 2018-02-15 | Resolved: 2023-03-08

## 2023-03-08 PROBLEM — H02.883 MEIBOMIAN GLAND DYSFUNCTION (MGD) OF BOTH EYES: Status: RESOLVED | Noted: 2018-02-15 | Resolved: 2023-03-08

## 2023-03-08 PROBLEM — G89.29 CHRONIC MIDLINE LOW BACK PAIN WITHOUT SCIATICA: Status: RESOLVED | Noted: 2019-06-10 | Resolved: 2023-03-08

## 2023-03-08 PROBLEM — M54.50 CHRONIC MIDLINE LOW BACK PAIN WITHOUT SCIATICA: Status: RESOLVED | Noted: 2019-06-10 | Resolved: 2023-03-08

## 2023-03-08 PROCEDURE — 3075F SYST BP GE 130 - 139MM HG: CPT | Performed by: INTERNAL MEDICINE

## 2023-03-08 PROCEDURE — 1125F AMNT PAIN NOTED PAIN PRSNT: CPT | Performed by: INTERNAL MEDICINE

## 2023-03-08 PROCEDURE — G0439 PPPS, SUBSEQ VISIT: HCPCS | Performed by: INTERNAL MEDICINE

## 2023-03-08 PROCEDURE — 3078F DIAST BP <80 MM HG: CPT | Performed by: INTERNAL MEDICINE

## 2023-03-08 PROCEDURE — 96160 PT-FOCUSED HLTH RISK ASSMT: CPT | Performed by: INTERNAL MEDICINE

## 2023-03-08 PROCEDURE — 90677 PCV20 VACCINE IM: CPT | Performed by: INTERNAL MEDICINE

## 2023-03-08 PROCEDURE — 3008F BODY MASS INDEX DOCD: CPT | Performed by: INTERNAL MEDICINE

## 2023-03-08 PROCEDURE — G0009 ADMIN PNEUMOCOCCAL VACCINE: HCPCS | Performed by: INTERNAL MEDICINE

## 2023-03-08 PROCEDURE — 99397 PER PM REEVAL EST PAT 65+ YR: CPT | Performed by: INTERNAL MEDICINE

## 2023-03-11 RX ORDER — SERTRALINE HYDROCHLORIDE 25 MG/1
25 TABLET, FILM COATED ORAL DAILY
Qty: 90 TABLET | Refills: 3 | Status: SHIPPED | OUTPATIENT
Start: 2023-03-11

## 2023-03-11 NOTE — TELEPHONE ENCOUNTER
Refill passed per [place] [email or web address] [place], [facility] protocol.    Requested Prescriptions   Pending Prescriptions Disp Refills    SERTRALINE 25 MG Oral Tab [Pharmacy Med Name: SERTRALINE 25MG TABLETS] 90 tablet 1     Sig: TAKE 1 TABLET(25 MG) BY MOUTH DAILY       Psychiatric Non-Scheduled (Anti-Anxiety) Passed - 3/11/2023 11:10 AM        Passed - In person appointment or virtual visit in the past 6 mos or appointment in next 3 mos     Recent Outpatient Visits              3 days ago Encounter for annual health examination    Guillermo Mims, [address] MD    Office Visit    1 month ago Spinal stenosis of lumbar region with neurogenic claudication    [facility] Bianca Abarca DO    Office Visit    1 month ago Primary osteoarthritis of right knee    Guillermo Mims, [address], [place], [place]    Office Visit    2 months ago Primary osteoarthritis of right knee    Guillermo Mims, [address], [place], [place]    Office Visit    4 months ago Spinal stenosis of lumbar region with neurogenic claudication    [facility], [address], Omari Paez,     Office Visit          Future Appointments         Provider Department Appt Notes    In 5 days DO Guillermo Brown, [address], [facility]                    Recent Outpatient Visits              3 days ago Encounter for annual health examination    Catalina Hall MD    Office Visit    1 month ago Spinal stenosis of lumbar region with neurogenic claudication    [facility] Bianca Abarca DO    Office Visit    1 month ago Primary osteoarthritis of right knee    [address] [place] [address]    Office Visit    2 months ago Primary osteoarthritis of right knee    [address] [place] [address] Office Visit    4 months ago Spinal stenosis of lumbar region with neurogenic claudication    [facility], [address], [place]    Office Visit           Future Appointments         Provider Department Appt Notes    In 5 days DO Alec De La [facility]ente [facility], [address], [place]

## 2023-03-14 ENCOUNTER — LAB ENCOUNTER (OUTPATIENT)
Dept: LAB | Age: 78
End: 2023-03-14
Attending: INTERNAL MEDICINE
Payer: MEDICARE

## 2023-03-14 DIAGNOSIS — E78.2 MIXED HYPERLIPIDEMIA: ICD-10-CM

## 2023-03-14 DIAGNOSIS — I10 ESSENTIAL HYPERTENSION: ICD-10-CM

## 2023-03-14 DIAGNOSIS — Z00.00 ENCOUNTER FOR ANNUAL HEALTH EXAMINATION: ICD-10-CM

## 2023-03-14 DIAGNOSIS — R73.03 BORDERLINE DIABETES: ICD-10-CM

## 2023-03-14 LAB
ALBUMIN SERPL-MCNC: 3.5 G/DL (ref 3.4–5)
ALBUMIN/GLOB SERPL: 0.9 {RATIO} (ref 1–2)
ALP LIVER SERPL-CCNC: 127 U/L
ALT SERPL-CCNC: 30 U/L
ANION GAP SERPL CALC-SCNC: 6 MMOL/L (ref 0–18)
AST SERPL-CCNC: 19 U/L (ref 15–37)
BILIRUB SERPL-MCNC: 0.6 MG/DL (ref 0.1–2)
BUN BLD-MCNC: 12 MG/DL (ref 7–18)
BUN/CREAT SERPL: 10.4 (ref 10–20)
CALCIUM BLD-MCNC: 9.1 MG/DL (ref 8.5–10.1)
CHLORIDE SERPL-SCNC: 107 MMOL/L (ref 98–112)
CHOLEST SERPL-MCNC: 123 MG/DL (ref ?–200)
CO2 SERPL-SCNC: 29 MMOL/L (ref 21–32)
CREAT BLD-MCNC: 1.15 MG/DL
DEPRECATED RDW RBC AUTO: 42.9 FL (ref 35.1–46.3)
ERYTHROCYTE [DISTWIDTH] IN BLOOD BY AUTOMATED COUNT: 13.3 % (ref 11–15)
FASTING PATIENT LIPID ANSWER: YES
FASTING STATUS PATIENT QL REPORTED: YES
GFR SERPLBLD BASED ON 1.73 SQ M-ARVRAT: 49 ML/MIN/1.73M2 (ref 60–?)
GLOBULIN PLAS-MCNC: 4 G/DL (ref 2.8–4.4)
GLUCOSE BLD-MCNC: 110 MG/DL (ref 70–99)
HCT VFR BLD AUTO: 38.7 %
HDLC SERPL-MCNC: 51 MG/DL (ref 40–59)
HGB BLD-MCNC: 12.5 G/DL
LDLC SERPL CALC-MCNC: 61 MG/DL (ref ?–100)
MCH RBC QN AUTO: 28.3 PG (ref 26–34)
MCHC RBC AUTO-ENTMCNC: 32.3 G/DL (ref 31–37)
MCV RBC AUTO: 87.6 FL
NONHDLC SERPL-MCNC: 72 MG/DL (ref ?–130)
OSMOLALITY SERPL CALC.SUM OF ELEC: 294 MOSM/KG (ref 275–295)
PLATELET # BLD AUTO: 219 10(3)UL (ref 150–450)
POTASSIUM SERPL-SCNC: 3.8 MMOL/L (ref 3.5–5.1)
PROT SERPL-MCNC: 7.5 G/DL (ref 6.4–8.2)
RBC # BLD AUTO: 4.42 X10(6)UL
SODIUM SERPL-SCNC: 142 MMOL/L (ref 136–145)
TRIGL SERPL-MCNC: 44 MG/DL (ref 30–149)
TSI SER-ACNC: 3.42 MIU/ML (ref 0.36–3.74)
VLDLC SERPL CALC-MCNC: 7 MG/DL (ref 0–30)
WBC # BLD AUTO: 6 X10(3) UL (ref 4–11)

## 2023-03-14 PROCEDURE — 80053 COMPREHEN METABOLIC PANEL: CPT

## 2023-03-14 PROCEDURE — 85027 COMPLETE CBC AUTOMATED: CPT

## 2023-03-14 PROCEDURE — 83036 HEMOGLOBIN GLYCOSYLATED A1C: CPT

## 2023-03-14 PROCEDURE — 36415 COLL VENOUS BLD VENIPUNCTURE: CPT

## 2023-03-14 PROCEDURE — 80061 LIPID PANEL: CPT

## 2023-03-14 PROCEDURE — 84443 ASSAY THYROID STIM HORMONE: CPT

## 2023-03-15 LAB
EST. AVERAGE GLUCOSE BLD GHB EST-MCNC: 134 MG/DL (ref 68–126)
HBA1C MFR BLD: 6.3 % (ref ?–5.7)

## 2023-03-16 ENCOUNTER — OFFICE VISIT (OUTPATIENT)
Dept: PHYSICAL MEDICINE AND REHAB | Facility: CLINIC | Age: 78
End: 2023-03-16
Payer: COMMERCIAL

## 2023-03-16 VITALS — HEART RATE: 76 BPM | WEIGHT: 263 LBS | OXYGEN SATURATION: 98 % | HEIGHT: 66 IN | BODY MASS INDEX: 42.27 KG/M2

## 2023-03-16 DIAGNOSIS — K21.9 GASTROESOPHAGEAL REFLUX DISEASE WITHOUT ESOPHAGITIS: ICD-10-CM

## 2023-03-16 DIAGNOSIS — M47.816 FACET ARTHROPATHY, LUMBAR: ICD-10-CM

## 2023-03-16 DIAGNOSIS — I10 ESSENTIAL HYPERTENSION: ICD-10-CM

## 2023-03-16 DIAGNOSIS — M48.062 SPINAL STENOSIS OF LUMBAR REGION WITH NEUROGENIC CLAUDICATION: ICD-10-CM

## 2023-03-16 DIAGNOSIS — E66.01 OBESITY, MORBID, BMI 40.0-49.9 (HCC): ICD-10-CM

## 2023-03-16 DIAGNOSIS — M17.11 PRIMARY OSTEOARTHRITIS OF RIGHT KNEE: Primary | ICD-10-CM

## 2023-03-16 DIAGNOSIS — I48.91 ATRIAL FIBRILLATION, UNSPECIFIED TYPE (HCC): ICD-10-CM

## 2023-03-16 PROCEDURE — 3008F BODY MASS INDEX DOCD: CPT | Performed by: PHYSICAL MEDICINE & REHABILITATION

## 2023-03-16 PROCEDURE — 99214 OFFICE O/P EST MOD 30 MIN: CPT | Performed by: PHYSICAL MEDICINE & REHABILITATION

## 2023-03-16 PROCEDURE — 1125F AMNT PAIN NOTED PAIN PRSNT: CPT | Performed by: PHYSICAL MEDICINE & REHABILITATION

## 2023-03-20 ENCOUNTER — TELEPHONE (OUTPATIENT)
Dept: PHYSICAL MEDICINE AND REHAB | Facility: CLINIC | Age: 78
End: 2023-03-20

## 2023-03-20 DIAGNOSIS — M17.11 PRIMARY OSTEOARTHRITIS OF RIGHT KNEE: Primary | ICD-10-CM

## 2023-03-20 NOTE — TELEPHONE ENCOUNTER
Initiated authorization for Right knee Geniculate nerve radiofrequency ablation under fluoroscopy guidance CPT 30504 dx:M17.11 to be [place] with [facility]  Status: Approved-valid 3/20/23-6/18/23  Notification or Prior Authorization is not required for the requested services    This [facility] members plan does not currently require a prior authorization for these services. If you have general questions about the prior authorization requirements, please call us at [phone] or visit [employer] > Clinician Resources > Advance and Admission Notification Requirements. The number above acknowledges your notification. Please write this number down for future reference. Notification is not a guarantee of coverage or payment.     Decision ID #:[id number]    Per Dr. Simmons/ BRYANNA

## 2023-03-21 ENCOUNTER — TELEPHONE (OUTPATIENT)
Dept: INTERNAL MEDICINE CLINIC | Facility: CLINIC | Age: 78
End: 2023-03-21

## 2023-03-21 NOTE — TELEPHONE ENCOUNTER
Patient called, verified Name and . Relayed Babita Dasilva PA-C's message below. Patient verbalized understanding and had no further questions at this time. Angela,  Your labs overall look good. Your kidney function came back slightly low. Please stay well hydrated.  If you have any questions please contact our office  -[address] [place],[facility] Physician Assistant working with Dr. Fatoumata Johnson   Written by [address] [place],[facility], URSZULA on 3/21/2023 10:48 AM CDT

## 2023-03-22 NOTE — TELEPHONE ENCOUNTER
Status of Anticoag  [x] Clearance pending to hold Eliquis 3 days prior to Right knee Geniculate nerve radiofrequency ablation under fluoroscopy guidance under MAC faxed to Dr. Amarjit Tenorio at [phone]  [] Clearance approved  [] Clearance denied

## 2023-03-23 NOTE — TELEPHONE ENCOUNTER
Status of Anticoag  [] Clearance pending  [x] Clearance approved to hold Warfarin 3 days prior to procedure. Placed into scanning.   [] Clearance denied

## 2023-03-24 NOTE — TELEPHONE ENCOUNTER
Refill Request    Medication request:   gabapentin 300 MG Oral Cap  Take 1 capsule (300 mg total) by mouth 3 (three) times daily. Kulwinder Hennessy Y, DO   Due back to clinic per last office note: 2 weeks after genicular ablation   NOV: Visit date not found      [place]: 02/09/23 #90    Urine drug screen (if applicable): na  Pain contract: luciano    LOV plan (if weaning or changing medications): Per Dr. Nicole Head Tylenol and Gabapentin 300 mg with relief. \"    Please review and sign if appropriate.

## 2023-03-27 RX ORDER — GABAPENTIN 300 MG/1
CAPSULE ORAL
Qty: 90 CAPSULE | Refills: 0 | Status: SHIPPED | OUTPATIENT
Start: 2023-03-27

## 2023-03-29 NOTE — TELEPHONE ENCOUNTER
Patient has been scheduled for Right knee Geniculate nerve radiofrequency ablation under fluoroscopy guidance under MAC on 5/12/23 at the [facility] with Dr. Guillermo Gonzalez.   -Anesthesia type: MAC.  -Patient informed to fast 12 hours prior to procedure with IVCS/MAC.   -Scheduling [facility] covid testing required for all procedures whether patient is vaccinated or not. -Patient was advised that if he/she does receive the covid vaccine it needs to be at least 2 weeks before or after the injection. -Medications and allergies reviewed. -Patient reminded to hold NSAIDs (Ibuprofen, ASA 81, Aleve, Naproxen, Mobic, Diclofenac, Etodolac, Celebrex etc.) for 3 days prior to Lumbar MBB/Facet if BMI is greater than 35. For Cervical injections only hold multivitamins, Vitamin E, Fish Oil, Phentermine/Lomaira for 7 days prior to injection and NSAIDS.  mg to be held for 7 days prior to injections.  -If patient is receiving MAC/IVCS Phentermine Edwin Blase) will need to be held for 7 days prior to injection.  -If on blood thinner clearance has been received to hold this medication by provider.   -Patient informed he/she will need a  to and from procedure. Wai Gorman is located in the [facility] [address] 1[address] floor,  may park in the yellow/purple parking lot. Patient verbalized understanding and agrees with plan.  -----> Scheduled in Epic: Yes  -----> Scheduled in [employer]: NO - surgical case sent. Left message with Aleida winslow

## 2023-04-11 ENCOUNTER — MED REC SCAN ONLY (OUTPATIENT)
Dept: PHYSICAL MEDICINE AND REHAB | Facility: CLINIC | Age: 78
End: 2023-04-11

## 2023-04-12 ENCOUNTER — TELEPHONE (OUTPATIENT)
Dept: INTERNAL MEDICINE CLINIC | Facility: CLINIC | Age: 78
End: 2023-04-12

## 2023-04-12 NOTE — TELEPHONE ENCOUNTER
Patient stated that she gained some weight is working on trying to lose the weight. Stated that she is on eliquis 5mg 2 times daily. A shake to help lose weight has lucia in it and wanted to make sure lucia was ok since on a blood thinner? Please advise.

## 2023-04-13 ENCOUNTER — TELEPHONE (OUTPATIENT)
Dept: NEUROLOGY | Facility: CLINIC | Age: 78
End: 2023-04-13

## 2023-04-13 NOTE — TELEPHONE ENCOUNTER
Patient called back (name and  verified) and instructed on providers message below. Patient verbalized understanding and agrees to plan.

## 2023-04-13 NOTE — TELEPHONE ENCOUNTER
Patient calling to see if Bowen Mccann can prescribe Meloxicam, she had tried 1 pill in the past and it worked for her back pain, she wants to see if  can prescribe this for her. Please call to advice.

## 2023-04-14 RX ORDER — MELOXICAM 15 MG/1
15 TABLET ORAL DAILY
Qty: 14 TABLET | Refills: 0 | Status: SHIPPED | OUTPATIENT
Start: 2023-04-14 | End: 2023-04-28

## 2023-04-17 ENCOUNTER — TELEPHONE (OUTPATIENT)
Dept: PHYSICAL MEDICINE AND REHAB | Facility: CLINIC | Age: 78
End: 2023-04-17

## 2023-05-10 RX ORDER — GABAPENTIN 300 MG/1
CAPSULE ORAL
Qty: 90 CAPSULE | Refills: 0 | Status: SHIPPED | OUTPATIENT
Start: 2023-05-10

## 2023-05-10 NOTE — TELEPHONE ENCOUNTER
Refill Request    Medication request: GABAPENTIN 300 MG Oral Cap  TAKE 1 CAPSULE(300 MG) BY MOUTH THREE TIMES DAILY    LOV:3/16/2023 David Powell, DO   Due back to clinic per last office note: \"We will follow-up 4 weeks after radiofrequency ablation procedure for the genicular nerves\"  NOV: 6/9/2023 David Powell, DO      [place]: 03/27/23 #90    Urine drug screen (if applicable): na  Pain contract: na    LOV plan (if weaning or changing medications): Per Dr. Subhash Marcus Tylenol and Gabapentin 300mg with relief. \"    Please review and sign if appropriate.

## 2023-06-09 ENCOUNTER — APPOINTMENT (OUTPATIENT)
Dept: GENERAL RADIOLOGY | Facility: HOSPITAL | Age: 78
End: 2023-06-09
Attending: PHYSICAL MEDICINE & REHABILITATION
Payer: MEDICARE

## 2023-06-09 ENCOUNTER — ANESTHESIA (OUTPATIENT)
Dept: SURGERY | Facility: HOSPITAL | Age: 78
End: 2023-06-09
Payer: MEDICARE

## 2023-06-09 ENCOUNTER — HOSPITAL ENCOUNTER (OUTPATIENT)
Facility: HOSPITAL | Age: 78
Setting detail: HOSPITAL OUTPATIENT SURGERY
Discharge: HOME | End: 2023-06-09
Attending: PHYSICAL MEDICINE & REHABILITATION | Admitting: PHYSICAL MEDICINE & REHABILITATION
Payer: MEDICARE

## 2023-06-09 ENCOUNTER — ANESTHESIA EVENT (OUTPATIENT)
Dept: SURGERY | Facility: HOSPITAL | Age: 78
End: 2023-06-09
Payer: MEDICARE

## 2023-06-09 VITALS
TEMPERATURE: 98 F | HEIGHT: 66 IN | DIASTOLIC BLOOD PRESSURE: 68 MMHG | OXYGEN SATURATION: 95 % | BODY MASS INDEX: 46.12 KG/M2 | HEART RATE: 64 BPM | RESPIRATION RATE: 16 BRPM | SYSTOLIC BLOOD PRESSURE: 143 MMHG | WEIGHT: 287 LBS

## 2023-06-09 PROCEDURE — 015H3ZZ DESTRUCTION OF PERONEAL NERVE, PERCUTANEOUS APPROACH: ICD-10-PCS | Performed by: PHYSICAL MEDICINE & REHABILITATION

## 2023-06-09 PROCEDURE — 64624 DSTRJ NULYT AGT GNCLR NRV: CPT | Performed by: PHYSICAL MEDICINE & REHABILITATION

## 2023-06-09 RX ORDER — HYDROMORPHONE HYDROCHLORIDE 1 MG/ML
0.4 INJECTION, SOLUTION INTRAMUSCULAR; INTRAVENOUS; SUBCUTANEOUS EVERY 5 MIN PRN
Status: DISCONTINUED | OUTPATIENT
Start: 2023-06-09 | End: 2023-06-09

## 2023-06-09 RX ORDER — HYDROMORPHONE HYDROCHLORIDE 1 MG/ML
0.2 INJECTION, SOLUTION INTRAMUSCULAR; INTRAVENOUS; SUBCUTANEOUS EVERY 5 MIN PRN
Status: DISCONTINUED | OUTPATIENT
Start: 2023-06-09 | End: 2023-06-09

## 2023-06-09 RX ORDER — LIDOCAINE HYDROCHLORIDE 20 MG/ML
INJECTION, SOLUTION EPIDURAL; INFILTRATION; INTRACAUDAL; PERINEURAL AS NEEDED
Status: DISCONTINUED | OUTPATIENT
Start: 2023-06-09 | End: 2023-06-09 | Stop reason: HOSPADM

## 2023-06-09 RX ORDER — LIDOCAINE HYDROCHLORIDE 10 MG/ML
INJECTION, SOLUTION EPIDURAL; INFILTRATION; INTRACAUDAL; PERINEURAL AS NEEDED
Status: DISCONTINUED | OUTPATIENT
Start: 2023-06-09 | End: 2023-06-09 | Stop reason: HOSPADM

## 2023-06-09 RX ORDER — MORPHINE SULFATE 4 MG/ML
2 INJECTION, SOLUTION INTRAMUSCULAR; INTRAVENOUS EVERY 10 MIN PRN
Status: DISCONTINUED | OUTPATIENT
Start: 2023-06-09 | End: 2023-06-09

## 2023-06-09 RX ORDER — SODIUM CHLORIDE, SODIUM LACTATE, POTASSIUM CHLORIDE, CALCIUM CHLORIDE 600; 310; 30; 20 MG/100ML; MG/100ML; MG/100ML; MG/100ML
INJECTION, SOLUTION INTRAVENOUS CONTINUOUS
Status: DISCONTINUED | OUTPATIENT
Start: 2023-06-09 | End: 2023-06-09

## 2023-06-09 RX ORDER — HYDROMORPHONE HYDROCHLORIDE 1 MG/ML
0.6 INJECTION, SOLUTION INTRAMUSCULAR; INTRAVENOUS; SUBCUTANEOUS EVERY 5 MIN PRN
Status: DISCONTINUED | OUTPATIENT
Start: 2023-06-09 | End: 2023-06-09

## 2023-06-09 RX ORDER — MORPHINE SULFATE 4 MG/ML
4 INJECTION, SOLUTION INTRAMUSCULAR; INTRAVENOUS EVERY 10 MIN PRN
Status: DISCONTINUED | OUTPATIENT
Start: 2023-06-09 | End: 2023-06-09

## 2023-06-09 RX ORDER — ACETAMINOPHEN 500 MG
1000 TABLET ORAL ONCE
Status: COMPLETED | OUTPATIENT
Start: 2023-06-09 | End: 2023-06-09

## 2023-06-09 RX ORDER — NALOXONE HYDROCHLORIDE 0.4 MG/ML
80 INJECTION, SOLUTION INTRAMUSCULAR; INTRAVENOUS; SUBCUTANEOUS AS NEEDED
Status: DISCONTINUED | OUTPATIENT
Start: 2023-06-09 | End: 2023-06-09

## 2023-06-09 RX ORDER — LIDOCAINE HYDROCHLORIDE 10 MG/ML
INJECTION, SOLUTION EPIDURAL; INFILTRATION; INTRACAUDAL; PERINEURAL AS NEEDED
Status: DISCONTINUED | OUTPATIENT
Start: 2023-06-09 | End: 2023-06-09 | Stop reason: SURG

## 2023-06-09 RX ORDER — FAMOTIDINE 20 MG/1
20 TABLET, FILM COATED ORAL ONCE
Status: DISCONTINUED | OUTPATIENT
Start: 2023-06-09 | End: 2023-06-09 | Stop reason: HOSPADM

## 2023-06-09 RX ORDER — MORPHINE SULFATE 10 MG/ML
6 INJECTION, SOLUTION INTRAMUSCULAR; INTRAVENOUS EVERY 10 MIN PRN
Status: DISCONTINUED | OUTPATIENT
Start: 2023-06-09 | End: 2023-06-09

## 2023-06-09 RX ADMIN — LIDOCAINE HYDROCHLORIDE 50 MG: 10 INJECTION, SOLUTION EPIDURAL; INFILTRATION; INTRACAUDAL; PERINEURAL at 13:43:00

## 2023-06-09 RX ADMIN — SODIUM CHLORIDE, SODIUM LACTATE, POTASSIUM CHLORIDE, CALCIUM CHLORIDE: 600; 310; 30; 20 INJECTION, SOLUTION INTRAVENOUS at 13:38:00

## 2023-06-09 NOTE — DISCHARGE SUMMARY
Outpatient Surgery Brief Discharge Summary         Patient ID:  Rayo Sevilla  [id number]  69year old  2/20/1945    Discharge Diagnoses: Primary osteoarthritis of right knee [M17.11]    Procedures: Right knee superior lateral, superior medial and inferiormedial geniculate nerve radiofrequency ablation under MAC    Discharged Condition: stable    Disposition: home    Patient Instructions: Follow-up with Cristopher Wu DO in 4 weeks.     Diet: regular diet  Activity: As tolerated    Laney Lima DO, FAAPMR & CAQSM  Physical Medicine and Rehabilitation/Sports Medicine  MEDICAL CENTER [facility]

## 2023-06-09 NOTE — OPERATIVE REPORT
[facility] Outpatient Surgery    GENICULATE NERVE RADIOFREQUENCY LESIONING  NAME:  Beatris Olea    MR #:    [id number] :  1945     PHYSICIAN:  James Squires DO        Operative Report    DATE OF PROCEDURE: 2023   PREOPERATIVE DIAGNOSES: No diagnosis found. POSTOPERATIVE DIAGNOSES:   No diagnosis found. PROCEDURES: right superior lateral and medial, and inferior medial geniculate nerves which are branches of the sciatic nerve, the femoral nerve, and saphenous nerve respectively Radiofrequency Neurotomies done under fluoroscopic guidance with contrast enhancement. SURGEON: James Squires DO   ANESTHESIA: MAC   INDICATIONS:      OPERATIVE PROCEDURE:  Written consent was obtained from the patient. The patient was brought into the operating room and placed in the supine position on the fluoroscopy table . The patient's skin was cleaned and draped in a normal sterile fashion. Using AP fluoroscopy, the right knee(s) was/were identified. Jeral Mew were placed on the patient's skin overlying the superior aspects of the medial and lateral femoral epicondyles, and the distal aspect of the medial condyle corresponding to the superior lateral and medial, and inferior medial geniculate nerves which are branches of the sciatic nerve, the femoral nerve, and saphenous nerve respectively. At this point in time, the patient's skin was anesthetized with 1% PF lidocaine without epinephrine overlying each needle site. Then, 17 gauge 4 mm active tip radiofrequency needles were inserted and directed to the above stated sites. When they felt to be in good position, sensory stimulation was performed to identify correct needle placement. Then each site was anesthetized  with 1 ml of 2% PF Lidocaine without epinephrine. Then radiofrequency lesioning was performed heating the needle tips to 80 degrees Celsius for 150 seconds. After this, the needles were removed. The patient's skin was cleaned. Band-Aids were applied. The patient was transferred to the cart and into [facility]. The patient was given discharge instructions and will follow up in the clinic as scheduled. Throughout the whole procedure, the patient's pulse oximetry and vital signs were monitored and they remained completely stable. Also, throughout the whole procedure, prior to injection of any medication, aspiration was performed. No blood, fluid, or air was aspirated at anytime.

## 2023-07-04 DIAGNOSIS — K21.9 GASTROESOPHAGEAL REFLUX DISEASE WITHOUT ESOPHAGITIS: ICD-10-CM

## 2023-07-04 DIAGNOSIS — I10 ESSENTIAL HYPERTENSION: ICD-10-CM

## 2023-07-04 DIAGNOSIS — M10.9 GOUT, UNSPECIFIED CAUSE, UNSPECIFIED CHRONICITY, UNSPECIFIED SITE: ICD-10-CM

## 2023-07-04 RX ORDER — TRIAMTERENE AND HYDROCHLOROTHIAZIDE 37.5; 25 MG/1; MG/1
CAPSULE ORAL
Qty: 90 CAPSULE | Refills: 3 | Status: SHIPPED | OUTPATIENT
Start: 2023-07-04

## 2023-07-04 RX ORDER — PANTOPRAZOLE SODIUM 40 MG/1
TABLET, DELAYED RELEASE ORAL
Qty: 90 TABLET | Refills: 3 | Status: SHIPPED | OUTPATIENT
Start: 2023-07-04

## 2023-07-04 RX ORDER — AMLODIPINE BESYLATE 5 MG/1
5 TABLET ORAL DAILY
Qty: 90 TABLET | Refills: 3 | Status: SHIPPED | OUTPATIENT
Start: 2023-07-04

## 2023-07-04 RX ORDER — ALLOPURINOL 100 MG/1
100 TABLET ORAL DAILY
Qty: 90 TABLET | Refills: 3 | Status: SHIPPED | OUTPATIENT
Start: 2023-07-04

## 2023-07-04 NOTE — TELEPHONE ENCOUNTER
Routed to Dr Umu Her for advise, thanks.   rx for pantoprazole 40 mg faxed to pharm per protocol        Refill passed per [place] [facility] protocol   Requested Prescriptions   Pending Prescriptions Disp Refills    PANTOPRAZOLE 40 MG Oral Tab EC [Pharmacy Med Name: PANTOPRAZOLE 40MG TABLETS] 90 tablet 3     Sig: TAKE 1 TABLET(40 MG) BY MOUTH DAILY       Gastrointestional Medication Protocol Passed - 7/4/2023  5:58 AM        Passed - In person appointment or virtual visit in the past 12 mos or appointment in next 3 mos     Recent Outpatient Visits              3 months ago Primary osteoarthritis of right knee    [address] [facility], [facility], DO    Office Visit    3 months ago Encounter for annual health examination    [employer], [address] [facility], Antolin Blanco MD    Office Visit    5 months ago Spinal stenosis of lumbar region with neurogenic claudication    [facility] Bib MORALES, DO    Office Visit    5 months ago Primary osteoarthritis of right knee    [employer], [address], [facility], DO    Office Visit    6 months ago Primary osteoarthritis of right knee    [address] [facility], [facility], DO    Office Visit          Future Appointments         Provider Department Appt Notes    In 6 days Russel Clark, DO [employer], [address], [place] 2 wk f/up post injection  Declined video                 TRIAMTERENE-HYDROCHLOROTHIAZIDE 37.5-25 MG Oral Cap [place] Med Name: TRIAMTERENE 37.5MG/ HCTZ 25MG CAPS] 90 capsule 1     Sig: TAKE 1 CAPSULE BY MOUTH DAILY       Hypertensive Medications Protocol Failed - 7/4/2023  5:58 AM        Failed - Last BP reading less than 140/90     BP Readings from Last 1 Encounters:  06/09/23 : 143/68              Failed - EGFRCR or GFRAA > 50     GFR Evaluation  EGFRCR: 49 , resulted on 3/14/2023          Passed - In person appointment in the past 12 or next 3 months     Recent Outpatient Visits              3 months ago Primary osteoarthritis of right knee    [facility], [address], [place], [place]    Office Visit    3 months ago Encounter for annual health examination    Skip Phillips MD    Office Visit    5 months ago Spinal stenosis of lumbar region with neurogenic claudication    [facility] Rivas Lis Y, DO    Office Visit    5 months ago Primary osteoarthritis of right knee    [facility], [address], [facility],     Office Visit    6 months ago Primary osteoarthritis of right knee    [address] [place],[address], [address] [facility], [facility], DO    Office Visit          Future Appointments         Provider Department Appt Notes    In 6 days David Powell [facility], [address] [facility], [place] 2 wk f/up post injection  Declined video               Passed - CMP or BMP in past 6 months     Recent Results (from the past 4392 hour(s))   COMP METABOLIC PANEL (14)    Collection Time: 03/14/23  4:46 PM   Result Value Ref Range    Glucose 110 (H) 70 - 99 mg/dL    Sodium 142 136 - 145 mmol/L    Potassium 3.8 3.5 - 5.1 mmol/L    Chloride 107 98 - 112 mmol/L    CO2 29.0 21.0 - 32.0 mmol/L    Anion Gap 6 0 - 18 mmol/L    BUN 12 7 - 18 mg/dL    Creatinine 1.15 (H) 0.55 - 1.02 mg/dL    BUN/CREA Ratio 10.4 10.0 - 20.0    Calcium, Total 9.1 8.5 - 10.1 mg/dL    Calculated Osmolality 294 275 - 295 mOsm/kg    eGFR-Cr 49 (L) >=60 mL/min/1.73m2    ALT 30 13 - 56 U/L    AST 19 15 - 37 U/L    Alkaline Phosphatase 127 55 - 142 U/L    Bilirubin, Total 0.6 0.1 - 2.0 mg/dL    Total Protein 7.5 6.4 - 8.2 g/dL    Albumin 3.5 3.4 - 5.0 g/dL    Globulin  4.0 2.8 - 4.4 g/dL    A/G Ratio 0.9 (L) 1.0 - 2.0    Patient Fasting for CMP?  Yes      *Note: Due to a large number of results and/or encounters for the requested time period, some results have not been displayed. A complete set of results can be found in Results Review.                Passed - In person appointment or virtual visit in the past 6 months     Recent Outpatient Visits              3 months ago Primary osteoarthritis of right knee    [address] [facility], [facility],     Office Visit    3 months ago Encounter for annual health examination    [address], [address] Gan MD    Office Visit    5 months ago Spinal stenosis of lumbar region with neurogenic claudication    [facility] Millicent MORALES, DO    Office Visit    5 months ago Primary osteoarthritis of right knee    [address], [address], [facility], DO    Office Visit    6 months ago Primary osteoarthritis of right knee    [address], [address], [facility], DO    Office Visit          Future Appointments         Provider Department Appt Notes    In 6 days Bianca Abarca, DO [address], [address], [place] 2 wk f/up post injection  Declined video                 ALLOPURINOL 100 MG Oral Tab [Pharmacy Med Name: ALLOPURINOL 100MG TABLETS] 90 tablet 1     Sig: TAKE 1 TABLET(100 MG) BY MOUTH DAILY       There is no refill protocol information for this order       AMLODIPINE 5 MG Oral Tab [Pharmacy Med Name: AMLODIPINE BESYLATE 5MG TABLETS] 90 tablet 1     Sig: TAKE 1 TABLET(5 MG) BY MOUTH DAILY       Hypertensive Medications Protocol Failed - 7/4/2023  5:58 AM        Failed - Last BP reading less than 140/90     BP Readings from Last 1 Encounters:  06/09/23 : 143/68              Failed - EGFRCR or GFRAA > 50     GFR Evaluation  EGFRCR: 49 , resulted on 3/14/2023          Passed - In person appointment in the past 12 or next 3 months     Recent Outpatient Visits              3 months ago Primary osteoarthritis of right knee [facility], [address], [place], [place]    Office Visit    3 months ago Encounter for annual health examination    Skip Barker MD    Office Visit    5 months ago Spinal stenosis of lumbar region with neurogenic claudication    [facility] Silas MORALES, DO    Office Visit    5 months ago Primary osteoarthritis of right knee    [facility], [address] [facility], [place], [place]    Office Visit    6 months ago Primary osteoarthritis of right knee    [address] [place],[address], [address] [facility], [facility],     Office Visit          Future Appointments         Provider Department Appt Notes    In 6 days Dalia García DO [facility], [address] [facility] [facility], [place] 2 wk f/up post injection  Declined video               Passed - CMP or BMP in past 6 months     Recent Results (from the past 4392 hour(s))   COMP METABOLIC PANEL (14)    Collection Time: 03/14/23  4:46 PM   Result Value Ref Range    Glucose 110 (H) 70 - 99 mg/dL    Sodium 142 136 - 145 mmol/L    Potassium 3.8 3.5 - 5.1 mmol/L    Chloride 107 98 - 112 mmol/L    CO2 29.0 21.0 - 32.0 mmol/L    Anion Gap 6 0 - 18 mmol/L    BUN 12 7 - 18 mg/dL    Creatinine 1.15 (H) 0.55 - 1.02 mg/dL    BUN/CREA Ratio 10.4 10.0 - 20.0    Calcium, Total 9.1 8.5 - 10.1 mg/dL    Calculated Osmolality 294 275 - 295 mOsm/kg    eGFR-Cr 49 (L) >=60 mL/min/1.73m2    ALT 30 13 - 56 U/L    AST 19 15 - 37 U/L    Alkaline Phosphatase 127 55 - 142 U/L    Bilirubin, Total 0.6 0.1 - 2.0 mg/dL    Total Protein 7.5 6.4 - 8.2 g/dL    Albumin 3.5 3.4 - 5.0 g/dL    Globulin  4.0 2.8 - 4.4 g/dL    A/G Ratio 0.9 (L) 1.0 - 2.0    Patient Fasting for CMP? Yes      *Note: Due to a large number of results and/or encounters for the requested time period, some results have not been displayed. A complete set of results can be found in Results Review.                Passed - In person appointment or virtual visit in the past 6 months     Recent Outpatient Visits              3 months ago Primary osteoarthritis of right knee    [address] [facility], [place], [place]    Office Visit    3 months ago Encounter for annual health examination    [facility], [address] MD    Office Visit    5 months ago Spinal stenosis of lumbar region with neurogenic claudication    [facility] Priya, DO    Office Visit    5 months ago Primary osteoarthritis of right knee    [facility], [address], [place], [place]    Office Visit    6 months ago Primary osteoarthritis of right knee    [facility], [address], [place], [place]    Office Visit          Future Appointments         Provider Department Appt Notes    In 6 days DO Bear Cho, [address], [place] 2 wk f/up post injection  Declined video

## 2023-07-09 DIAGNOSIS — E78.2 MIXED HYPERLIPIDEMIA: ICD-10-CM

## 2023-07-09 RX ORDER — ATORVASTATIN CALCIUM 40 MG/1
40 TABLET, FILM COATED ORAL NIGHTLY
Qty: 90 TABLET | Refills: 3 | Status: SHIPPED | OUTPATIENT
Start: 2023-07-09

## 2023-07-10 ENCOUNTER — OFFICE VISIT (OUTPATIENT)
Dept: PHYSICAL MEDICINE AND REHAB | Facility: CLINIC | Age: 78
End: 2023-07-10
Payer: COMMERCIAL

## 2023-07-10 VITALS — DIASTOLIC BLOOD PRESSURE: 56 MMHG | SYSTOLIC BLOOD PRESSURE: 110 MMHG | HEART RATE: 56 BPM

## 2023-07-10 DIAGNOSIS — M17.11 PRIMARY OSTEOARTHRITIS OF RIGHT KNEE: Primary | ICD-10-CM

## 2023-07-10 PROCEDURE — 3074F SYST BP LT 130 MM HG: CPT | Performed by: PHYSICAL MEDICINE & REHABILITATION

## 2023-07-10 PROCEDURE — 3078F DIAST BP <80 MM HG: CPT | Performed by: PHYSICAL MEDICINE & REHABILITATION

## 2023-07-10 PROCEDURE — 1160F RVW MEDS BY RX/DR IN RCRD: CPT | Performed by: PHYSICAL MEDICINE & REHABILITATION

## 2023-07-10 PROCEDURE — 1159F MED LIST DOCD IN RCRD: CPT | Performed by: PHYSICAL MEDICINE & REHABILITATION

## 2023-07-10 PROCEDURE — 99214 OFFICE O/P EST MOD 30 MIN: CPT | Performed by: PHYSICAL MEDICINE & REHABILITATION

## 2023-07-10 PROCEDURE — 1125F AMNT PAIN NOTED PAIN PRSNT: CPT | Performed by: PHYSICAL MEDICINE & REHABILITATION

## 2023-07-10 RX ORDER — GABAPENTIN 300 MG/1
300 CAPSULE ORAL 3 TIMES DAILY
Qty: 90 CAPSULE | Refills: 0 | Status: SHIPPED | OUTPATIENT
Start: 2023-07-10

## 2023-07-10 RX ORDER — TRAMADOL HYDROCHLORIDE 50 MG/1
50 TABLET ORAL EVERY 6 HOURS PRN
Qty: 30 TABLET | Refills: 0 | Status: SHIPPED | OUTPATIENT
Start: 2023-07-10

## 2023-07-10 NOTE — TELEPHONE ENCOUNTER
Refill passed per [place] [employer] [place] protocol.     Requested Prescriptions   Pending Prescriptions Disp Refills    ATORVASTATIN 40 MG Oral Tab [Pharmacy Med Name: ATORVASTATIN 40MG TABLETS] 90 tablet 1     Sig: TAKE 1 TABLET(40 MG) BY MOUTH DAILY       Cholesterol Medication Protocol Passed - 7/9/2023 12:42 PM        Passed - ALT in past 12 months        Passed - LDL in past 12 months        Passed - Last ALT < 80     Lab Results   Component Value Date    ALT 30 03/14/2023             Passed - Last LDL < 130     Lab Results   Component Value Date    LDL 61 03/14/2023             Passed - In person appointment or virtual visit in the past 12 mos or appointment in next 3 mos     Recent Outpatient Visits              3 months ago Primary osteoarthritis of right knee    [address] [facility], [place], [place]    Office Visit    4 months ago Encounter for annual health examination    [facility], Tamar Hall MD    Office Visit    5 months ago Spinal stenosis of lumbar region with neurogenic claudication    [facility], DO    Office Visit    5 months ago Primary osteoarthritis of right knee    [address] [facility] [facility] [address] [address], [address] [facility],[address], [place], [place]    Office Visit    6 months ago Primary osteoarthritis of right knee    [address] [facility], [facility]    Office Visit          Future Appointments         Provider Department Appt Notes    Tomorrow DO [facility], [address], [place] 2 wk f/up post injection  Declined video

## 2023-07-10 NOTE — TELEPHONE ENCOUNTER
Refill Request    Medication request:   Refill Request     Medication request: GABAPENTIN 300 MG Oral Cap  TAKE 1 CAPSULE(300 MG) BY MOUTH THREE TIMES DAILY    LOV: 3/16/23 & 6/9/2023 Right knee superior lateral, superior medial and inferiormedial geniculate nerve radiofrequency ablation under MAC Kavitha Luong DO     LOV:3/16/2023 Kavitha Luong DO   Due back to clinic per last office note: \"We will follow-up 4 weeks after radiofrequency ablation procedure for the genicular nerves\"  NOV: 6/9/2023 Kavitha Luong DO      [place]: 05/10/23/23 #90     Urine drug screen (if applicable): na  Pain contract: na     LOV plan (if weaning or changing medications): Per Dr. Peralta Dilling Tylenol and Gabapentin 300mg with relief. \"     Please review and sign if appropriate.

## 2023-07-10 NOTE — PATIENT INSTRUCTIONS
-Glucosamine/Chondroitin 1500/1200mg daily  -Ice the knees  -Home exercises and weight loss management  -Follow up in 3 months

## 2023-07-14 NOTE — PROGRESS NOTES
[address]  FOLLOW UP EVALUATION      Chief Complaint: knee pain. HISTORY OF PRESENT ILLNESS:   Patient presents with: Follow - Up: Pt returns for follow up after 6/9/23. Right knee Geniculate nerve radiofrequency ablation. Reports 20% improvement on day 4 and gradually back to baseline. CPL 8/10 with walking. Rest is 0/10. Continues using otc tylenol arthritis 2-3 times daily. 7/10/23  Patient is following up after right knee radiofrequency ablation procedure. She states about 20 to 30% improvement overall. She states this is much better than the corticosteroid injections however pain is starting to return. She states pain is 8 out of 10 with walking. Rates pain to be 0 out of 10 with resting. She continues over-the-counter Tylenol arthritis 2-3 times daily. She denies any change in strength or bowel bladder. She denies any fevers or chills. 3/16/23  Patient is here for follow-up evaluation after caudal epidural steroid injection. She states that the pain has improved after this in the low back and the leg. However the right knee is bothering her again. Pain is aching throbbing nature worse with increased activity and ambulation. She denies any falls or mechanical symptoms otherwise but does have stiffness and severe pain rated 7 out of 10. She has been taking gabapentin 300 mg and Tylenol with some relief. She denies any numbness tingling radiating into the leg. She states the corticosteroid injections in the knee have not provided any significant long-lasting improvement. She cannot have surgical replacement due to her body habitus and medical comorbidities. 1/11/23  Patient is here for follow-up evaluation after right knee injection. She states the pain is improved but she is having right leg pain radiating from the low back into the leg. She notices the symptoms when ambulating and with increased activity.   Pain radiates along the lateral aspect and anterior aspect of the leg. She states numbness and tingling with weakness. She takes gabapentin as prescribed 3 times daily with some relief. She denies any changes in strength sensation or bowel bladder. Her pain is current 10 out of 10.    12/19/22  Patient is following up for right knee pain again. She states that since last visit, her pain is starting to worsen. She is noted to have effusion and pain can worsen with prolonged activity and ambulation. She is noticing some swelling as well. She denies any changes in strength or bowel bladder. Pain can occasionally radiate down to the ankle and up to the hip. Pain is rated 5 out of 10 with sitting but 10 out of 10 with increased activity. She is taking Tylenol arthritis as tolerated. Patient is wondering if she can have repeat injection for the knee    11/7/22  Patient is here for follow-up evaluation after caudal epidural steroid injection. She has noted good improvement in the radicular symptoms of the lower extremities. She states for the first 2 weeks the pain was very severe however is now improved. Rates the pain in the right lateral hip to be the worst of her symptoms at this time. Pain worsens with increased activity and ambulation. She takes Tylenol as needed. She rates this pain in the hip sometimes to be 7 out of 10 but has noted improvement in her functional tolerance in terms of ambulation tolerance and distance. She continues to take gabapentin 3 times daily as well. Overall, she is happy with her progress. 9/29/22  Patient is here for follow-up evaluation of low back pain. She recently had bilateral lower lumbar facet joint injections. She did not follow-up after however states that for the first 3 weeks after the injection she noted great improvement however pain is now returned. Pain also radiates to bilateral lower extremities especially with ambulation.   She feels that the pain is relieved with sitting immediately upon sitting. She takes gabapentin and Tylenol but does not notice any significant change. Rates her pain to be 8 out of 10 today. She denies any weakness, any saddle anesthesia. 6/21/22  Patient is here for follow-up evaluation of knee pain. She states that she has noted great improvement after the injection. However, she is endorsing worsening low back pain again. Pain is localized in the axial lumbar spine. She notices the pain with standing and ambulation and increased activity. She denies any radiating symptoms in the lower extremities, any changes in strength sensation or bowel bladder. She takes Tylenol as needed for the pain. Rates pain to be 8 out of 10. She is trying to work on weight loss on her own. She states that the facet joint injections in the past were very helpful to manage her pain. She is wondering if she can have repeat injection. 4/5/22  Patient is here for follow-up evaluation. She has responded really well to the injection in the right knee. She is still noticing good improvement. She is able to sleep better stand better walk better and is not having severe pain like before. She still has some locking symptoms of the right knee and is starting to experience some pain in the left knee however this not as severe. She is unable to do physical therapy as she is taking care of her granddaughter. She denies any other mechanical symptoms today. She is taking Tylenol occasionally as tolerated and occasionally in ibuprofen which is more helpful than Tylenol. Rates the pain to be a mild discomfort today. 1/24/22  Patient is here for follow-up evaluation of bilateral knee pain. Since last office visit, she has noted 80% improvement in both knees. She takes Tylenol as needed. Rates her pain to be moderate to severe still in both knees right worse than left. She denies any changes in strength sensation or bowel bladder.   She has persistent limitations in function however has been working on trying to lose weight as well. Pain is located diffusely throughout the knees. H&P:  The patient is a 69year old female with significant past medical history of anxiety, depression, hypertension, CKD, obesity, GERD, atherosclerosis, primary osteoarthritis of bilateral knees who presents with low back pain. Patient denies any injury or trauma. Onset of symptoms was several years ago with recent exacerbation. Pain radiates from her back to the bilateral legs all the way down to her feet worse in the left leg compared to the right. She has numbness and tingling sensation in this area in the ankles as well. She has had x-ray imaging of the lumbar spine as well as CT imaging. She has not any specific treatment for this problem. She takes Tylenol without any relief. Her pain is rated 8 out of 10 and is worsened with any standing and ambulation and improved with rest.  She notices that when she has ambulating if she leans forward her pain is improved. PAST MEDICAL HISTORY:     Past Medical History:   Diagnosis Date    Allergic rhinitis     Anxiety     Arthritis     Chronic atrial fibrillation ([address]     Current mild episode of major depressive disorder, unspecified whether recurrent ([address]     Esophageal reflux     Essential hypertension     Hx of motion sickness     Hyperlipidemia     Major depression in partial remission ([address]     Major depressive disorder     Osteoarthritis 2009    Sleep apnea 2018         PAST SURGICAL HISTORY:     Past Surgical History:   Procedure Laterality Date    APPENDECTOMY      BACK SURGERY      Discectomy    D & C  2012          OTHER SURGICAL HISTORY      SPINE SURGERY PROCEDURE UNLISTED      Cervical fusion         CURRENT MEDICATIONS:     Current Outpatient Medications   Medication Sig Dispense Refill    gabapentin 300 MG Oral Cap Take 1 capsule (300 mg total) by mouth 3 (three) times daily.  90 capsule 0    traMADol 50 MG Oral Tab Take 1 tablet (50 mg total) by mouth every 6 (six) hours as needed for Pain. 30 tablet 0    atorvastatin 40 MG Oral Tab Take 1 tablet (40 mg total) by mouth nightly. 90 tablet 3    pantoprazole 40 MG Oral Tab EC TAKE 1 TABLET(40 MG) BY MOUTH DAILY 90 tablet 3    triamterene-hydroCHLOROthiazide 37.5-25 MG Oral Cap TAKE 1 CAPSULE BY MOUTH DAILY 90 capsule 3    allopurinol 100 MG Oral Tab Take 1 tablet (100 mg total) by mouth daily. 90 tablet 3    amLODIPine 5 MG Oral Tab Take 1 tablet (5 mg total) by mouth daily. 90 tablet 3    sertraline 25 MG Oral Tab Take 1 tablet (25 mg total) by mouth daily. (Patient taking differently: Take 1 tablet (25 mg total) by mouth every morning.) 90 tablet 3    acetaminophen 500 MG Oral Tab Take 1 tablet (500 mg total) by mouth daily as needed. apixaban 5 MG Oral Tab Take 1 tablet (5 mg total) by mouth 2 (two) times daily. ALLERGIES:     Codeine                 NAUSEA ONLY    Comment:Upset stomach      FAMILY HISTORY:     Family History   Problem Relation Age of Onset    Hypertension Father     Kidney Disease Father     Macular degeneration Mother     Diabetes Neg     Glaucoma Neg           SOCIAL HISTORY:     Social History     Socioeconomic History    Marital status: Single   Tobacco Use    Smoking status: Never    Smokeless tobacco: Never    Tobacco comments:     None   Vaping Use    Vaping Use: Never used   Substance and Sexual Activity    Alcohol use: Never    Drug use: Never   Other Topics Concern    Caffeine Concern No    Exercise No   Social History Narrative    The patient does use an assistive device. . single point cane     The patient does live in a home with stairs. REVIEW OF SYSTEMS:   A comprehensive 10 point review of systems was completed. Pertinent positives and negatives noted in the the HPI.       PHYSICAL EXAM:   /56   Pulse 56   General: No immediate distress  Head: Normocephalic/ Atraumatic  Eyes: Extra-occular movements intact. Ears: No auricular hematoma or deformities  Mouth: No lesions or ulcerations  Heart: peripheral pulses intact. Normal capillary refill.    Lungs: Non-labored respirations  Abdomen: No abdominal guarding  Extremities: No lower extremity edema bilaterally   Skin: No lesions noted   Cognition: alert & oriented x 3, attentive, able to follow 2 step commands, comprehention intact, spontaneous speech intact  Psychiatric: Mood and affect appropriate    Gait Normal     Musculoskeletal/Neurological Exam:    KNEE:  Inspection: no erythema, swelling, or obvious deformity  Palpation: Tenderness to palpation along the lateral joint line  ROM: Restricted in forward flexion  Strength: 5/5      LABS:     Lab Results   Component Value Date     (H) 03/14/2023    A1C 6.3 (H) 03/14/2023     Lab Results   Component Value Date    WBC 6.0 03/14/2023    RBC 4.42 03/14/2023    HGB 12.5 03/14/2023    HCT 38.7 03/14/2023    MCV 87.6 03/14/2023    MCH 28.3 03/14/2023    MCHC 32.3 03/14/2023    RDW 13.3 03/14/2023    .0 03/14/2023     Lab Results   Component Value Date     (H) 03/14/2023    BUN 12 03/14/2023    BUNCREA 10.4 03/14/2023    CREATSERUM 1.15 (H) 03/14/2023    ANIONGAP 6 03/14/2023    GFRNAA 49 (L) 01/18/2022    GFRAA 57 (L) 01/18/2022    CA 9.1 03/14/2023    OSMOCALC 294 03/14/2023    ALKPHO 127 03/14/2023    AST 19 03/14/2023    ALT 30 03/14/2023    BILT 0.6 03/14/2023    TP 7.5 03/14/2023    ALB 3.5 03/14/2023    GLOBULIN 4.0 03/14/2023     03/14/2023    K 3.8 03/14/2023     03/14/2023    CO2 29.0 03/14/2023     Lab Results   Component Value Date    PTP 13.8 09/07/2021    INR 1.08 09/07/2021     No results found for: VITD, QVITD, DERG02AW    IMAGING:   Xray Right knee 7/19/21  I personally reviewed x-ray imaging which is notable for tricompartmental osteoarthritis with findings most pronounced in the lateral compartment with joint space narrowing    CT Scan LUMBAR SPINE dated January 25, 2021   Personally reviewed CT imaging which is notable for severe degenerative disc and facet disease throughout the lumbar spine with severe stenosis at multiple levels with a hyperdensity in the canal at L3-4 which may be an extruded disc fragment. All imaging results were reviewed and discussed with patient. ASSESSMENT:     1. Primary osteoarthritis of right knee        Melvina Eisenmenger is a pleasant 60-year-old female following up for right knee pain secondary to osteoarthritis. She is not had significant improvement with corticosteroid injection or geniculate radiofrequency ablation. Today we discussed starting tramadol 50 mg every 6 hours as needed. She will follow-up with me in 3 months. I also advised her to start glucosamine/chondroitin 1500 over 1200 mg daily. I advised her to use topical treatment and work on home exercises and weight loss management. The patient verbalized understanding with the plan and was in agreement. All questions/concerns were addressed and there were no barriers to learning. Darwin COLES. [id number] [facility]  Physical Medicine and Rehabilitation/Sports Medicine

## 2023-07-27 ENCOUNTER — TELEPHONE (OUTPATIENT)
Dept: PHYSICAL MEDICINE AND REHAB | Facility: CLINIC | Age: 78
End: 2023-07-27

## 2023-07-27 NOTE — TELEPHONE ENCOUNTER
Pt is requesting a CT order for lower back.  Please contact Pt when order has been made, [phone]

## 2023-08-03 ENCOUNTER — NURSE TRIAGE (OUTPATIENT)
Dept: INTERNAL MEDICINE CLINIC | Facility: CLINIC | Age: 78
End: 2023-08-03

## 2023-08-03 NOTE — TELEPHONE ENCOUNTER
Action Requested: Summary for Provider     []  Critical Lab, Recommendations Needed  [x] Need Additional Advice  []   FYI    []   Need Orders  [] Need Medications Sent to Pharmacy  []  Other     SUMMARY: Patient requesting appointment, only with Dr Torres , to evaluate bilateral foot and ankle swelling occurring for past 4 months. Denies foot pain or shortness of breath. Is on triamterene hydrochlorothiazide 37.5-25 mg. Needs appointment after 2 pm so her daughter can drive her. Dr Hallie Murphy, can we use a res24? Reason for call: Acute  bilateral foot/ankle edema. Onset: 4 months. Spoke with patient,  verified. Wants to see Dr Hallie Murphy for bilateral foot/ankle edema. Denies injury, pain, erythema, or cold to touch. Denies chest pain or shortness of breath.      Reason for Disposition   Patient wants to be seen    Protocols used: Leg Swelling and Edema-A-OH

## 2023-08-03 NOTE — TELEPHONE ENCOUNTER
No appointments noted until after 8/23/23. The patient does not want a virtual appointment.       Appointment made with LOU De La Cruz,  for Monday  Future Appointments   Date Time Provider Bear Damon   8/7/2023  3:20 PM LOU Martin [facility] [place]   10/10/2023  3:30 PM Allyson Rodriguez DO [facility]

## 2023-08-14 RX ORDER — GABAPENTIN 300 MG/1
300 CAPSULE ORAL 3 TIMES DAILY
Qty: 90 CAPSULE | Refills: 2 | Status: SHIPPED | OUTPATIENT
Start: 2023-08-14

## 2023-08-14 NOTE — TELEPHONE ENCOUNTER
Refill Request    Medication request: Gabapentin 300mg oral cap    LOV: 7/10/2023 Carrillo Peak, DO   RTC: 3 months  NOV: 10/10/2023 Carrillo Peak, [place]      [place] 7/10/23 #76    UDS: (if applicable): None  Pain contract: None    LOV plan (if weaning or changing medications): N/A mentioned on 7/10/2023  Adjusted to 90day supply  Will fwd to DO to review.

## 2023-10-09 ENCOUNTER — MED REC SCAN ONLY (OUTPATIENT)
Dept: INTERNAL MEDICINE CLINIC | Facility: CLINIC | Age: 78
End: 2023-10-09

## 2023-11-09 ENCOUNTER — NURSE TRIAGE (OUTPATIENT)
Dept: INTERNAL MEDICINE CLINIC | Facility: CLINIC | Age: 78
End: 2023-11-09

## 2023-11-09 NOTE — TELEPHONE ENCOUNTER
Action Requested: Summary for Provider     []  Critical Lab, Recommendations Needed  [] Need Additional Advice  []   FYI    []   Need Orders  [] Need Medications Sent to Pharmacy  []  Other     SUMMARY: Patient c/o of post nasal drip with brown mucous, feels she hears some wheezing that comes and goes    Denies; Fever, chills, chest pain, sob, blood    OTC medication with little relief, Covid(-)    Advised appointment, can be seen today.     Future Appointments   Date Time Provider Bear Damon   11/10/2023 10:00 AM LOU Tapia   1/9/2024  1:00 PM Ritika Art MD [facility]         Reason for Disposition   Coughing up donna-colored (reddish-brown) or blood-tinged sputum    Protocols used: Cough-A-OH

## 2023-11-13 ENCOUNTER — OFFICE VISIT (OUTPATIENT)
Dept: INTERNAL MEDICINE CLINIC | Facility: CLINIC | Age: 78
End: 2023-11-13

## 2023-11-13 VITALS
WEIGHT: 286 LBS | BODY MASS INDEX: 45.96 KG/M2 | DIASTOLIC BLOOD PRESSURE: 82 MMHG | HEIGHT: 66 IN | SYSTOLIC BLOOD PRESSURE: 118 MMHG | HEART RATE: 65 BPM | TEMPERATURE: 98 F | OXYGEN SATURATION: 97 %

## 2023-11-13 DIAGNOSIS — R05.1 ACUTE COUGH: Primary | ICD-10-CM

## 2023-11-13 DIAGNOSIS — J30.9 ALLERGIC RHINITIS, UNSPECIFIED SEASONALITY, UNSPECIFIED TRIGGER: ICD-10-CM

## 2023-11-13 PROCEDURE — 1160F RVW MEDS BY RX/DR IN RCRD: CPT | Performed by: INTERNAL MEDICINE

## 2023-11-13 PROCEDURE — 1125F AMNT PAIN NOTED PAIN PRSNT: CPT | Performed by: INTERNAL MEDICINE

## 2023-11-13 PROCEDURE — 3079F DIAST BP 80-89 MM HG: CPT | Performed by: INTERNAL MEDICINE

## 2023-11-13 PROCEDURE — 3008F BODY MASS INDEX DOCD: CPT | Performed by: INTERNAL MEDICINE

## 2023-11-13 PROCEDURE — 3074F SYST BP LT 130 MM HG: CPT | Performed by: INTERNAL MEDICINE

## 2023-11-13 PROCEDURE — 1159F MED LIST DOCD IN RCRD: CPT | Performed by: INTERNAL MEDICINE

## 2023-11-13 PROCEDURE — 99214 OFFICE O/P EST MOD 30 MIN: CPT | Performed by: INTERNAL MEDICINE

## 2023-11-13 RX ORDER — DOXYCYCLINE HYCLATE 100 MG
100 TABLET ORAL 2 TIMES DAILY
Qty: 20 TABLET | Refills: 0 | Status: SHIPPED | OUTPATIENT
Start: 2023-11-13

## 2023-11-13 RX ORDER — FLUTICASONE PROPIONATE 50 MCG
2 SPRAY, SUSPENSION (ML) NASAL DAILY
Qty: 1 EACH | Refills: 0 | Status: SHIPPED | OUTPATIENT
Start: 2023-11-13 | End: 2024-11-07

## 2023-11-13 RX ORDER — LEVOCETIRIZINE DIHYDROCHLORIDE 5 MG/1
5 TABLET, FILM COATED ORAL EVERY EVENING
Qty: 30 TABLET | Refills: 0 | Status: SHIPPED | OUTPATIENT
Start: 2023-11-13

## 2023-11-15 RX ORDER — FLUTICASONE PROPIONATE 50 MCG
SPRAY, SUSPENSION (ML) NASAL
Qty: 48 G | Refills: 0 | OUTPATIENT
Start: 2023-11-15

## 2023-11-24 ENCOUNTER — NURSE TRIAGE (OUTPATIENT)
Dept: INTERNAL MEDICINE CLINIC | Facility: CLINIC | Age: 78
End: 2023-11-24

## 2023-11-24 NOTE — TELEPHONE ENCOUNTER
Action Requested: Summary for Provider     []  Critical Lab, Recommendations Needed  [x] Need Additional Advice  []   FYI    []   Need Orders  [] Need Medications Sent to Pharmacy  []  Other     SUMMARY: Any other recommendations? last visit 11/13/23 with Dr. Jacky Carrera    Cough improved and nasal congestion persisting - but manageable. Lupe has RSV and Croup - he was diagnosed today. RN offered appointment, declines for now. Reason for call: Condition Update  Onset: last visit 11/13/23 with Dr. Jacky Carrera        Patient called office. Date of birth and full name both confirmed. Cough has improved. Nasal congestion persisting   Grandson diagnosed today with RSV and Croup. RN offered appointment for re-evaluation and treatment plan discussion. Patient declines for now, but agrees to call back if symptoms persist.   Patient verbalizes understanding and is agreeable to instructions.          Reason for Disposition   Nasal discharge present > 10 days    Protocols used: Cough-A-OH

## 2023-12-11 RX ORDER — GABAPENTIN 300 MG/1
300 CAPSULE ORAL 3 TIMES DAILY
Qty: 90 CAPSULE | Refills: 2 | Status: SHIPPED | OUTPATIENT
Start: 2023-12-11

## 2023-12-11 NOTE — TELEPHONE ENCOUNTER
Refill Request    Medication request: gabapentin 300 MG Oral Cap. Take 1 capsule (300 mg total) by mouth 3 (three) times daily. LOV:7/10/2023 Allyson Rodriguez, DO   Due back to clinic per last office note:  She will follow-up with me in 3 months. NOV: Visit date not found      [place]: 11/12/2023 #90 (30 days)    Urine drug screen (if applicable): N/A  Pain contract: N/A    LOV plan (if weaning or changing medications): not mentioned.

## 2024-02-14 ENCOUNTER — OFFICE VISIT (OUTPATIENT)
Dept: INTERNAL MEDICINE CLINIC | Facility: CLINIC | Age: 79
End: 2024-02-14
Payer: COMMERCIAL

## 2024-02-14 VITALS
WEIGHT: 284 LBS | BODY MASS INDEX: 45.64 KG/M2 | HEIGHT: 66 IN | OXYGEN SATURATION: 97 % | DIASTOLIC BLOOD PRESSURE: 69 MMHG | RESPIRATION RATE: 18 BRPM | HEART RATE: 75 BPM | SYSTOLIC BLOOD PRESSURE: 130 MMHG

## 2024-02-14 DIAGNOSIS — R73.03 BORDERLINE DIABETES: ICD-10-CM

## 2024-02-14 DIAGNOSIS — K21.9 GASTROESOPHAGEAL REFLUX DISEASE WITHOUT ESOPHAGITIS: ICD-10-CM

## 2024-02-14 DIAGNOSIS — Z23 NEED FOR VACCINATION: ICD-10-CM

## 2024-02-14 DIAGNOSIS — F32.4 MAJOR DEPRESSIVE DISORDER WITH SINGLE EPISODE, IN PARTIAL REMISSION (HCC): Chronic | ICD-10-CM

## 2024-02-14 DIAGNOSIS — D69.2 SENILE PURPURA (HCC): ICD-10-CM

## 2024-02-14 DIAGNOSIS — M48.062 SPINAL STENOSIS OF LUMBAR REGION WITH NEUROGENIC CLAUDICATION: ICD-10-CM

## 2024-02-14 DIAGNOSIS — I10 ESSENTIAL HYPERTENSION: ICD-10-CM

## 2024-02-14 DIAGNOSIS — Z12.31 SCREENING MAMMOGRAM, ENCOUNTER FOR: ICD-10-CM

## 2024-02-14 DIAGNOSIS — I70.0 ATHEROSCLEROSIS OF AORTA (HCC): ICD-10-CM

## 2024-02-14 DIAGNOSIS — Z00.00 ENCOUNTER FOR ANNUAL HEALTH EXAMINATION: Primary | ICD-10-CM

## 2024-02-14 DIAGNOSIS — M10.9 GOUT, UNSPECIFIED CAUSE, UNSPECIFIED CHRONICITY, UNSPECIFIED SITE: ICD-10-CM

## 2024-02-14 DIAGNOSIS — E78.2 MIXED HYPERLIPIDEMIA: ICD-10-CM

## 2024-02-14 DIAGNOSIS — F13.20 SEDATIVE, HYPNOTIC OR ANXIOLYTIC DEPENDENCE, UNCOMPLICATED (HCC): ICD-10-CM

## 2024-02-14 DIAGNOSIS — M17.0 PRIMARY OSTEOARTHRITIS OF KNEES, BILATERAL: ICD-10-CM

## 2024-02-14 DIAGNOSIS — I48.91 ATRIAL FIBRILLATION, UNSPECIFIED TYPE (HCC): ICD-10-CM

## 2024-02-14 DIAGNOSIS — F41.9 ANXIETY: ICD-10-CM

## 2024-02-14 DIAGNOSIS — G91.2 (IDIOPATHIC) NORMAL PRESSURE HYDROCEPHALUS (HCC): ICD-10-CM

## 2024-02-14 DIAGNOSIS — M46.96 INFLAMMATORY SPONDYLOPATHY OF LUMBAR REGION (HCC): ICD-10-CM

## 2024-02-14 DIAGNOSIS — E79.0 ELEVATED BLOOD URIC ACID LEVEL: ICD-10-CM

## 2024-02-14 DIAGNOSIS — N18.31 STAGE 3A CHRONIC KIDNEY DISEASE (HCC): ICD-10-CM

## 2024-02-14 DIAGNOSIS — E66.01 OBESITY, MORBID, BMI 40.0-49.9 (HCC): ICD-10-CM

## 2024-02-14 PROBLEM — R05.1 ACUTE COUGH: Status: RESOLVED | Noted: 2023-11-13 | Resolved: 2024-02-14

## 2024-02-14 PROBLEM — J30.9 ALLERGIC RHINITIS: Status: RESOLVED | Noted: 2023-11-13 | Resolved: 2024-02-14

## 2024-02-14 PROBLEM — M47.816 FACET ARTHROPATHY, LUMBAR: Status: RESOLVED | Noted: 2021-04-21 | Resolved: 2024-02-14

## 2024-02-14 PROBLEM — G56.03 BILATERAL CARPAL TUNNEL SYNDROME: Status: RESOLVED | Noted: 2019-05-22 | Resolved: 2024-02-14

## 2024-02-14 RX ORDER — SERTRALINE HYDROCHLORIDE 25 MG/1
25 TABLET, FILM COATED ORAL EVERY EVENING
Qty: 90 TABLET | Refills: 3 | Status: SHIPPED | OUTPATIENT
Start: 2024-02-14

## 2024-02-14 NOTE — PATIENT INSTRUCTIONS
Angela Matamoros's SCREENING SCHEDULE   Tests on this list are recommended by your physician but may not be covered, or covered at this frequency, by your insurer.   Please check with your insurance carrier before scheduling to verify coverage.   PREVENTATIVE SERVICES FREQUENCY &  COVERAGE DETAILS LAST COMPLETION DATE   Diabetes Screening    Fasting Blood Sugar /  Glucose    One screening every 12 months if never tested or if previously tested but not diagnosed with pre-diabetes   One screening every 6 months if diagnosed with pre-diabetes Lab Results   Component Value Date     (H) 03/14/2023        Cardiovascular Disease Screening    Lipid Panel  Cholesterol  Lipoprotein (HDL)  Triglycerides Covered every 5 years for all Medicare beneficiaries without apparent signs or symptoms of cardiovascular disease Lab Results   Component Value Date    CHOLEST 123 03/14/2023    HDL 51 03/14/2023    LDL 61 03/14/2023    TRIG 44 03/14/2023         Electrocardiogram (EKG)   Covered if needed at Welcome to Medicare, and non-screening if indicated for medical reasons 09/07/2021      Ultrasound Screening for Abdominal Aortic Aneurysm (AAA) Covered once in a lifetime for one of the following risk factors   • Men who are 65-75 years old and have ever smoked   • Anyone with a family history -     Colorectal Cancer Screening  Covered for ages 50-85; only need ONE of the following:    Colonoscopy   Covered every 10 years    Covered every 2 years if patient is at high risk or previous colonoscopy was abnormal -    No recommendations at this time    Flexible Sigmoidoscopy   Covered every 4 years -    Fecal Occult Blood Test Covered annually -   Bone Density Screening    Bone density screening    Covered every 2 years after age 65 if diagnosed with risk of osteoporosis or estrogen deficiency.    Covered yearly for long-term glucocorticoid medication use (Steroids) No results found for this or any previous visit.      Health  Maintenance Due   Topic Date Due   • DEXA Scan  Never done      Pap and Pelvic    Pap   Covered every 2 years for women at normal risk; Annually if at high risk -  No recommendations at this time    Chlamydia Annually if high risk -  No recommendations at this time   Screening Mammogram    Mammogram     Recommend annually for all female patients aged 40 and older    One baseline mammogram covered for patients aged 35-39 01/31/2022    Health Maintenance   Topic Date Due   • Mammogram  Discontinued       Immunizations    Influenza Covered once per flu season  Please get every year -  Influenza Vaccine(1) due on 10/01/2023    Pneumococcal Each vaccine ([id number] & [id number]) covered once after 65 Prevnar 13: 07/20/2020    [id number]: -     No recommendations at this time    Hepatitis B One screening covered for patients with certain risk factors   -  No recommendations at this time    Tetanus Toxoid Not covered by Medicare Part B unless medically necessary (cut with metal); may be covered with your pharmacy prescription benefits -    Tetanus, Diptheria and Pertusis TD and TDaP Not covered by Medicare Part B -  No recommendations at this time    Zoster Not covered by Medicare Part B; may be covered with your pharmacy  prescription benefits -  No recommendations at this time     Annual Monitoring of Persistent Medications (ACE/ARB, digoxin diuretics, anticonvulsants)    Potassium Annually Lab Results   Component Value Date    K 3.8 03/14/2023         Creatinine   Annually Lab Results   Component Value Date    CREATSERUM 1.15 (H) 03/14/2023         BUN Annually Lab Results   Component Value Date    BUN 12 03/14/2023       Drug Serum Conc Annually No results found for: \"DIGOXIN\", \"DIG\", \"VALP\"

## 2024-02-14 NOTE — PROGRESS NOTES
Subjective:   Angela Matamoros is a 78 year old female who presents for a MA (Medicare Advantage) Supervisit (Once per calendar year) and scheduled follow up of multiple significant but stable problems.   Pt comes for her medicare physical  [place] to [facility] and got a shot and will go back in one mn       HISTORY  Complains of back pain and  knee pain and will see [facility] tomorrow and have some gel injections into the knee  Granddaughter is her caretaker               HISTORY  12/2021 VISIT   physiatry Dr. Wu and got 2 injections in her knee and in the summer get injections to her back which helped things that made it might be coming back now  Knee still bothering her and difficult to go down to do laundry so she is wondering about homemaker services to help her with the laundry                 [facility]   Anxiety and depression   Hypertension  Hyperlipidemia  Carpal tonsillar  Osteoarthritis of bilateral knees-sees Dr. Zapata  GERD  History of ?  Gout--patient states that she was told she had elevated uric acid level and that is why she was started on the allopurinol she has never had a gouty attack and has never had a kidney stone--  Elevated uric acid level--  A FIB DIAG IN Sept 2021   History of COVID-19 August 21, 2021  Prediabetes     Lives alone    -------------------------------------------------------------------------------------------------------------------------------    History/Other:   Fall Risk Assessment:   She has been screened for Falls and is High Risk. Fall Prevention information provided to patient in After Visit Summary.    Do you feel unsteady when standing or walking?: Yes  Do you worry about falling?: Yes  Have you fallen in the past year?: No     Cognitive Assessment:   She had a completely normal cognitive assessment - see flowsheet entries     Functional Ability/Status:   Angela Matamoros has some abnormal functions as listed below:  She has difficulties Managing  Money/Bills based on screening of functional status.She has difficulties Taking Meds as Rx'd based on screening of functional status. She has Vision problems based on screening of functional status. She has Walking problems based on screening of functional status.       Depression Screening (PHQ-2/PHQ-9): PHQ-9 TOTAL SCORE: 3  , done 2/14/2024   Trouble falling or staying asleep, or sleeping too much: 3     If you checked off any problems, how difficult have these problems made it for you to do your work, take care of things at home, or get along with other people?: Not difficult at all    Last Sentinel Suicide Screening on 2/14/2024 was No Risk.          Advanced Directives:   She does NOT have a Living Will. [Do you have a living will?: No]  She does NOT have a Power of  for Health Care. [Do you have a healthcare power of ?: No]  Discussed Advance Care Planning with patient (and family/surrogate if present). Standard forms made available to patient in After Visit Summary.      Patient Active Problem List   Diagnosis    Mixed hyperlipidemia    Essential hypertension    Gout    Elevated blood uric acid level    Anxiety    CKD (chronic kidney disease) stage 3, GFR 30-59 ml/min (HCC)    Gastroesophageal reflux disease without esophagitis    Borderline diabetes    Obesity, morbid, BMI 40.0-49.9 (HCC)    Atherosclerosis of aorta (HCC)    (Idiopathic) normal pressure hydrocephalus (HCC)    Major depression in partial remission (HCC)    Spinal stenosis of lumbar region with neurogenic claudication    Atrial fibrillation (HCC)    Primary osteoarthritis of knees, bilateral    Inflammatory spondylopathy of lumbar region (HCC)    Senile purpura (HCC)    Sedative, hypnotic or anxiolytic dependence, uncomplicated (HCC)     Allergies:  She is allergic to codeine.    Current Medications:  Outpatient Medications Marked as Taking for the 2/14/24 encounter (Office Visit) with Lacey Darnell MD   Medication Sig     sertraline 25 MG Oral Tab Take 1 tablet (25 mg total) by mouth every evening.    gabapentin 300 MG Oral Cap Take 1 capsule (300 mg total) by mouth 3 (three) times daily.    levocetirizine 5 MG Oral Tab Take 1 tablet (5 mg total) by mouth every evening. For 2-3  weeks than as  needed    atorvastatin 40 MG Oral Tab Take 1 tablet (40 mg total) by mouth nightly.    pantoprazole 40 MG Oral Tab EC TAKE 1 TABLET(40 MG) BY MOUTH DAILY    triamterene-hydroCHLOROthiazide 37.5-25 MG Oral Cap TAKE 1 CAPSULE BY MOUTH DAILY    allopurinol 100 MG Oral Tab Take 1 tablet (100 mg total) by mouth daily.    amLODIPine 5 MG Oral Tab Take 1 tablet (5 mg total) by mouth daily.    acetaminophen 500 MG Oral Tab Take 1 tablet (500 mg total) by mouth daily as needed.    apixaban 5 MG Oral Tab Take 1 tablet (5 mg total) by mouth 2 (two) times daily.       Medical History:  She  has a past medical history of Allergic rhinitis, Anxiety, Arthritis (), Chronic atrial fibrillation (), Current mild episode of major depressive disorder, unspecified whether recurrent (), Esophageal reflux, Essential hypertension, motion sickness, Hyperlipidemia, Major depression in partial remission (), Major depressive disorder, Osteoarthritis (), and Sleep apnea ().  Surgical History:  She  has a past surgical history that includes appendectomy; back surgery (); other surgical history; d & c (); ; and spine surgery procedure unlisted ().   Family History:  Her family history includes Hypertension in her father; Kidney Disease in her father; Macular degeneration in her mother.  Social History:  She  reports that she has never smoked. She has never used smokeless tobacco. She reports that she does not drink alcohol and does not use drugs.    Tobacco:  She has never smoked tobacco.    CAGE Alcohol Screen:   CAGE screening score of 0 on 2024, showing low risk of alcohol abuse.      Patient Care Team:  Lacey Darnell MD as PCP -  General (Internal Medicine)  Vincent Camacho PT as Physical Therapist (Physical Therapy)    Review of Systems  GENERAL: feels well otherwise  SKIN: denies any unusual skin lesions  EYES: denies blurred vision or double vision  HEENT: denies nasal congestion, sinus pain or ST  LUNGS: denies shortness of breath with exertion  CARDIOVASCULAR: denies chest pain on exertion  GI: denies abdominal pain, denies heartburn  : denies dysuria, vaginal discharge or itching, no complaint of urinary incontinence   MUSCULOSKELETAL:  + back pain  NEURO: denies headaches  PSYCHE: denies depression or anxiety  HEMATOLOGIC: denies hx of anemia  ENDOCRINE: denies thyroid history  ALL/ASTHMA: denies hx of allergy or asthma    Objective:   Physical Exam  GENERAL: well developed, well nourished,in no apparent distress, walks with cane   SKIN: no rashes,no suspicious lesions  HEENT: atraumatic, normocephalic,ears and throat are clear, no frontal or maxillary sinus tenderness, pupils equal reactive to light bilaterally, extract muscles intact  NECK: supple,no adenopathy,nontender   LUNGS: clear to auscultation, no wheeze  CARDIO: RRR + systolic murmur  GI: good BS's,no masses or tenderness  EXTREMITIES: no cyanosis, or edema  BREAST: Bilateral breasts without any lumps, bilateral axilla without any lymphadenopathy, no nipple retraction or  discharge        /69 (BP Location: Right arm, Patient Position: Sitting, Cuff Size: large)   Pulse 75   Resp 18   Ht 5' 6\" (1.676 m)   Wt 284 lb (128.8 kg)   SpO2 97%   BMI 45.84 kg/m²  Estimated body mass index is 45.84 kg/m² as calculated from the following:    Height as of this encounter: 5' 6\" (1.676 m).    Weight as of this encounter: 284 lb (128.8 kg).    Medicare Hearing Assessment:   Hearing Screening    Screening Method: Finger Rub  Finger Rub Result: Pass                     Assessment & Plan:   Angela Matamoros is a 78 year old female who presents for a Medicare Assessment.      1. Encounter for annual health examination (Primary)  Advised patient to watch what she eats and exercise, seatbelt use no texting driving, sunscreen use advised     2. (Idiopathic) normal pressure hydrocephalus (HCC)  Stable     3. Essential hypertension  Advised pt to follow a low salt , low sodium (including fast foods and processed foods), can look up DASH diet, exercise 30 min a day , monitor bp   Cont meds     4. Mixed hyperlipidemia  Follow low-fat low-cholesterol diet and exercise with a goal of 30 minutes a day as tolerated, focus more on diet as exercise is difficult for continue medications    5. Atherosclerosis of aorta (HCC)  Overview:  1/4/20 CXR  On statin and eliquis so no asa     6. Atrial fibrillation, unspecified type (HCC)  On eliquis and f/u cardiology   7. Borderline diabetes  Follow a low carb low sugar due and exercise as tolerated with a goal of 30 minutes a day  8. Stage 3a chronic kidney disease (HCC)  Stay well-hydrated, avoid NSAIDs, monitor    9. Major depressive disorder with single episode, in partial remission (HCC)  -     Sertraline HCl; Take 1 tablet (25 mg total) by mouth every evening.  Dispense: 90 tablet; Refill: 3  Stable on medications-refilled    10. Anxiety  Stable on medications-continue  11. Sedative, hypnotic or anxiolytic dependence, uncomplicated (HCC)  Stable  12. Obesity, morbid, BMI 40.0-49.9 (HCC)  Overview:  BMI 41  Advised patient to follow healthy diet and exercise as tolerated  13. Senile purpura (HCC)  Stable, monitor  14. Inflammatory spondylopathy of lumbar region (HCC)  Stable, monitor  15. Spinal stenosis of lumbar region with neurogenic claudication  Stable, monitor  16. Gastroesophageal reflux disease without esophagitis  Stable, monitor, avoid the foods that cause symptoms, continue medications  17. Elevated blood uric acid level  Stable, monitor, continue medications  18. Gout, unspecified cause, unspecified chronicity, unspecified site  Stable,  monitor continue medications  19. Primary osteoarthritis of knees, bilateral  Stable, monitor, continue medications  20. Screening mammogram, encounter for  -     [facility] 2D+3D SCREENING BILAT (CPT=77067/83237); Future; Expected date: 02/14/2024  Ordered  21. Need for vaccination  -     Fluzone High Dose  65 years and older [79629]  Given              Preventative medicine  Labs 3/2023  reviewed   DEXA scan in 2016 in care everywhere was normal, reordered   cscope -will refer, FIT card test 1/2022  Mammogram 1/2022 , will get it every 2 years        The patient indicates understanding of these issues and agrees to the plan.  Reinforced healthy diet, lifestyle, and exercise.      No follow-ups on file.     Lacey Darnell MD, 2/14/2024     Supplementary Documentation:   General Health:  In the past six months, have you lost more than 10 pounds without trying?: 2 - No  Has your appetite been poor?: No  Type of Diet: Other  How does the patient maintain a good energy level?: Stretching  How would you describe your daily physical activity?: Light  How would you describe your current health state?: Fair  How do you maintain positive mental well-being?: Games;Visiting Family  On a scale of 0 to 10, with 0 being no pain and 10 being severe pain, what is your pain level?: 8 - (Severe)  In the past six months, have you experienced urine leakage?: 1-Yes  At any time do you feel concerned for the safety/well-being of yourself and/or your children, in your home or elsewhere?: No  Have you had any immunizations at another office such as Influenza, Hepatitis B, Tetanus, or Pneumococcal?: No         Angela Matamoros's SCREENING SCHEDULE   Tests on this list are recommended by your physician but may not be covered, or covered at this frequency, by your insurer.   Please check with your insurance carrier before scheduling to verify coverage.   PREVENTATIVE SERVICES FREQUENCY &  COVERAGE DETAILS LAST COMPLETION DATE   Diabetes  Screening    Fasting Blood Sugar /  Glucose    One screening every 12 months if never tested or if previously tested but not diagnosed with pre-diabetes   One screening every 6 months if diagnosed with pre-diabetes Lab Results   Component Value Date     (H) 03/14/2023        Cardiovascular Disease Screening    Lipid Panel  Cholesterol  Lipoprotein (HDL)  Triglycerides Covered every 5 years for all Medicare beneficiaries without apparent signs or symptoms of cardiovascular disease Lab Results   Component Value Date    CHOLEST 123 03/14/2023    HDL 51 03/14/2023    LDL 61 03/14/2023    TRIG 44 03/14/2023         Electrocardiogram (EKG)   Covered if needed at Welcome to Medicare, and non-screening if indicated for medical reasons 09/07/2021      Ultrasound Screening for Abdominal Aortic Aneurysm (AAA) Covered once in a lifetime for one of the following risk factors    Men who are 65-75 years old and have ever smoked    Anyone with a family history -     Colorectal Cancer Screening  Covered for ages 50-85; only need ONE of the following:    Colonoscopy   Covered every 10 years    Covered every 2 years if patient is at high risk or previous colonoscopy was abnormal -    No recommendations at this time    Flexible Sigmoidoscopy   Covered every 4 years -    Fecal Occult Blood Test Covered annually -   Bone Density Screening    Bone density screening    Covered every 2 years after age 65 if diagnosed with risk of osteoporosis or estrogen deficiency.    Covered yearly for long-term glucocorticoid medication use (Steroids) No results found for this or any previous visit.      Health Maintenance Due   Topic Date Due    DEXA Scan  Never done      Pap and Pelvic    Pap   Covered every 2 years for women at normal risk; Annually if at high risk -  No recommendations at this time    Chlamydia Annually if high risk -  No recommendations at this time   Screening Mammogram    Mammogram     Recommend annually for all female  patients aged 40 and older    One baseline mammogram covered for patients aged 35-39 01/31/2022    Health Maintenance   Topic Date Due    Mammogram  Discontinued       Immunizations    Influenza Covered once per flu season  Please get every year 02/14/2024  No recommendations at this time    Pneumococcal Each vaccine ([id number] & [id number]) covered once after 65 Prevnar 13: 07/20/2020    [id number]: -     No recommendations at this time    Hepatitis B One screening covered for patients with certain risk factors   -  No recommendations at this time    Tetanus Toxoid Not covered by Medicare Part B unless medically necessary (cut with metal); may be covered with your pharmacy prescription benefits -    Tetanus, Diptheria and Pertusis TD and TDaP Not covered by Medicare Part B -  No recommendations at this time    Zoster Not covered by Medicare Part B; may be covered with your pharmacy  prescription benefits -  No recommendations at this time     Annual Monitoring of Persistent Medications (ACE/ARB, digoxin diuretics, anticonvulsants)    Potassium Annually Lab Results   Component Value Date    K 3.8 03/14/2023         Creatinine   Annually Lab Results   Component Value Date    CREATSERUM 1.15 (H) 03/14/2023         BUN Annually Lab Results   Component Value Date    BUN 12 03/14/2023       Drug Serum Conc Annually No results found for: \"DIGOXIN\", \"DIG\", \"VALP\"

## 2024-02-16 ENCOUNTER — TELEPHONE (OUTPATIENT)
Dept: INTERNAL MEDICINE CLINIC | Facility: CLINIC | Age: 79
End: 2024-02-16

## 2024-02-16 DIAGNOSIS — R73.03 BORDERLINE DIABETES: ICD-10-CM

## 2024-02-16 DIAGNOSIS — E78.2 MIXED HYPERLIPIDEMIA: Primary | ICD-10-CM

## 2024-02-16 DIAGNOSIS — Z00.00 LABORATORY EXAMINATION ORDERED AS PART OF A ROUTINE GENERAL MEDICAL EXAMINATION: ICD-10-CM

## 2024-02-16 DIAGNOSIS — I10 ESSENTIAL HYPERTENSION: ICD-10-CM

## 2024-02-16 NOTE — TELEPHONE ENCOUNTER
Patient calling to request orders for yearly labs, please update through [employer], thanks.

## 2024-02-23 ENCOUNTER — LAB ENCOUNTER (OUTPATIENT)
Dept: LAB | Age: 79
End: 2024-02-23
Payer: MEDICARE

## 2024-02-23 DIAGNOSIS — R73.03 BORDERLINE DIABETES: ICD-10-CM

## 2024-02-23 DIAGNOSIS — E78.2 MIXED HYPERLIPIDEMIA: ICD-10-CM

## 2024-02-23 DIAGNOSIS — I10 ESSENTIAL HYPERTENSION: ICD-10-CM

## 2024-02-23 DIAGNOSIS — Z00.00 LABORATORY EXAMINATION ORDERED AS PART OF A ROUTINE GENERAL MEDICAL EXAMINATION: ICD-10-CM

## 2024-02-23 LAB
ALBUMIN SERPL-MCNC: 4.2 G/DL (ref 3.2–4.8)
ALBUMIN/GLOB SERPL: 1.1 {RATIO} (ref 1–2)
ALP LIVER SERPL-CCNC: 139 U/L
ALT SERPL-CCNC: 15 U/L
ANION GAP SERPL CALC-SCNC: 8 MMOL/L (ref 0–18)
AST SERPL-CCNC: 22 U/L (ref ?–34)
BASOPHILS # BLD AUTO: 0.07 X10(3) UL (ref 0–0.2)
BASOPHILS NFR BLD AUTO: 1 %
BILIRUB SERPL-MCNC: 0.6 MG/DL (ref 0.2–1.1)
BUN BLD-MCNC: 16 MG/DL (ref 9–23)
BUN/CREAT SERPL: 13.9 (ref 10–20)
CALCIUM BLD-MCNC: 9.4 MG/DL (ref 8.7–10.4)
CHLORIDE SERPL-SCNC: 104 MMOL/L (ref 98–112)
CHOLEST SERPL-MCNC: 146 MG/DL (ref ?–200)
CO2 SERPL-SCNC: 29 MMOL/L (ref 21–32)
CREAT BLD-MCNC: 1.15 MG/DL
DEPRECATED RDW RBC AUTO: 41 FL (ref 35.1–46.3)
EGFRCR SERPLBLD CKD-EPI 2021: 49 ML/MIN/1.73M2 (ref 60–?)
EOSINOPHIL # BLD AUTO: 0.17 X10(3) UL (ref 0–0.7)
EOSINOPHIL NFR BLD AUTO: 2.3 %
ERYTHROCYTE [DISTWIDTH] IN BLOOD BY AUTOMATED COUNT: 13.2 % (ref 11–15)
EST. AVERAGE GLUCOSE BLD GHB EST-MCNC: 137 MG/DL (ref 68–126)
FASTING PATIENT LIPID ANSWER: YES
FASTING STATUS PATIENT QL REPORTED: YES
GLOBULIN PLAS-MCNC: 3.7 G/DL (ref 2.8–4.4)
GLUCOSE BLD-MCNC: 108 MG/DL (ref 70–99)
HBA1C MFR BLD: 6.4 % (ref ?–5.7)
HCT VFR BLD AUTO: 39.4 %
HDLC SERPL-MCNC: 39 MG/DL (ref 40–59)
HGB BLD-MCNC: 12.8 G/DL
IMM GRANULOCYTES # BLD AUTO: 0.01 X10(3) UL (ref 0–1)
IMM GRANULOCYTES NFR BLD: 0.1 %
LDLC SERPL CALC-MCNC: 96 MG/DL (ref ?–100)
LYMPHOCYTES # BLD AUTO: 3.09 X10(3) UL (ref 1–4)
LYMPHOCYTES NFR BLD AUTO: 42.3 %
MCH RBC QN AUTO: 27.9 PG (ref 26–34)
MCHC RBC AUTO-ENTMCNC: 32.5 G/DL (ref 31–37)
MCV RBC AUTO: 85.8 FL
MONOCYTES # BLD AUTO: 0.71 X10(3) UL (ref 0.1–1)
MONOCYTES NFR BLD AUTO: 9.7 %
NEUTROPHILS # BLD AUTO: 3.25 X10 (3) UL (ref 1.5–7.7)
NEUTROPHILS # BLD AUTO: 3.25 X10(3) UL (ref 1.5–7.7)
NEUTROPHILS NFR BLD AUTO: 44.6 %
NONHDLC SERPL-MCNC: 107 MG/DL (ref ?–130)
OSMOLALITY SERPL CALC.SUM OF ELEC: 294 MOSM/KG (ref 275–295)
PLATELET # BLD AUTO: 251 10(3)UL (ref 150–450)
POTASSIUM SERPL-SCNC: 4.1 MMOL/L (ref 3.5–5.1)
PROT SERPL-MCNC: 7.9 G/DL (ref 5.7–8.2)
RBC # BLD AUTO: 4.59 X10(6)UL
SODIUM SERPL-SCNC: 141 MMOL/L (ref 136–145)
TRIGL SERPL-MCNC: 51 MG/DL (ref 30–149)
TSI SER-ACNC: 2.93 MIU/ML (ref 0.55–4.78)
VLDLC SERPL CALC-MCNC: 8 MG/DL (ref 0–30)
WBC # BLD AUTO: 7.3 X10(3) UL (ref 4–11)

## 2024-02-23 PROCEDURE — 80061 LIPID PANEL: CPT

## 2024-02-23 PROCEDURE — 85025 COMPLETE CBC W/AUTO DIFF WBC: CPT

## 2024-02-23 PROCEDURE — 83036 HEMOGLOBIN GLYCOSYLATED A1C: CPT

## 2024-02-23 PROCEDURE — 36415 COLL VENOUS BLD VENIPUNCTURE: CPT

## 2024-02-23 PROCEDURE — 84443 ASSAY THYROID STIM HORMONE: CPT

## 2024-02-23 PROCEDURE — 80053 COMPREHEN METABOLIC PANEL: CPT

## 2024-03-06 RX ORDER — GABAPENTIN 300 MG/1
300 CAPSULE ORAL 3 TIMES DAILY
Qty: 90 CAPSULE | Refills: 2 | Status: SHIPPED | OUTPATIENT
Start: 2024-03-06

## 2024-03-06 NOTE — TELEPHONE ENCOUNTER
Refill Request    Medication request: gabapentin 300 MG Oral Cap. Take 1 capsule (300 mg total) by mouth 3 (three) times daily.      LOV:7/10/2023 Roseanne Wu,    Due back to clinic per last office note:  Follow up in 3 months   NOV: Visit date not found      [place]: 2/4/2024 #90 (30 days)    Urine drug screen (if applicable): N/A  Pain contract: N/A    LOV plan (if weaning or changing medications): no changes mentioned.

## 2024-03-13 ENCOUNTER — TELEPHONE (OUTPATIENT)
Dept: INTERNAL MEDICINE CLINIC | Facility: CLINIC | Age: 79
End: 2024-03-13

## 2024-03-13 NOTE — TELEPHONE ENCOUNTER
Patient asking for appointment to discuss weight loss supplement. Patient assisted with an appointment for 3/27/24    Future Appointments   Date Time Provider Department Center   3/27/2024 11:00 AM Lacey Darnell MD [facility]

## 2024-05-24 ENCOUNTER — NURSE TRIAGE (OUTPATIENT)
Dept: INTERNAL MEDICINE CLINIC | Facility: CLINIC | Age: 79
End: 2024-05-24

## 2024-05-24 ENCOUNTER — TELEMEDICINE (OUTPATIENT)
Dept: INTERNAL MEDICINE CLINIC | Facility: CLINIC | Age: 79
End: 2024-05-24

## 2024-05-24 DIAGNOSIS — R05.1 ACUTE COUGH: Primary | ICD-10-CM

## 2024-05-24 DIAGNOSIS — J30.9 ALLERGIC RHINITIS, UNSPECIFIED SEASONALITY, UNSPECIFIED TRIGGER: ICD-10-CM

## 2024-05-24 RX ORDER — BENZONATATE 100 MG/1
100 CAPSULE ORAL 3 TIMES DAILY PRN
Qty: 30 CAPSULE | Refills: 0 | Status: SHIPPED | OUTPATIENT
Start: 2024-05-24

## 2024-05-24 RX ORDER — DOXYCYCLINE HYCLATE 100 MG/1
100 CAPSULE ORAL 2 TIMES DAILY
Qty: 20 CAPSULE | Refills: 0 | Status: SHIPPED | OUTPATIENT
Start: 2024-05-24

## 2024-05-24 NOTE — TELEPHONE ENCOUNTER
Patient stated that her cough started Saturday. The cough keeps patient up at night. Pt is coughign up phlegm and its a dark color and thick. No fever, chest pain, or shortness of breath. Patient taking mucinex but not really helping her. Patient not able to come in due to transportation but patient is able to do a video visit today. Appointment was made for a video visit.    Future Appointments   Date Time Provider Department Center   5/24/2024  2:00 PM Sarah Crawford APRN [facility]                     Reason for Disposition   Continuous (nonstop) coughing interferes with work or school and no improvement using cough treatment per Care Advice    Protocols used: Cough-A-OH

## 2024-05-24 NOTE — PROGRESS NOTES
Virtual Visit Check-In    CORINA VARGAS or legal guardian   verbally consents to a Virtual Visit Check-In service on 5/24/2024    Patient understands and accepts financial responsibility for any deductible, co-insurance and/or co-pays associated with this service.    Duration of the service:   Call duration  10 minutes   Video visit     Chief Complaint   Patient presents with    Cough/URI    Post Nasal Drip    Allergies       HPI:    Patient ID: Corina Vargas is a 79 year old female. She presents with a cough with thick grey sputum, runny nose and post-nasal drip. Her cough worsens at night time and she is not able to sleep. She is taking mucinex for her symptoms with little relief. She has had symptoms since Saturday. She is not able to tolerate Flonase Nasal Spray. She denies any fevers, chest pain or SOB.     ROS    Review of Systems   Constitutional:  Negative for fever.   HENT:  Positive for postnasal drip and rhinorrhea. Negative for congestion, ear pain, sinus pressure and sore throat.    Respiratory:  Positive for cough. Negative for shortness of breath and wheezing.    Cardiovascular:  Negative for chest pain.   Gastrointestinal: Negative.    Genitourinary: Negative.    Musculoskeletal: Negative.    Skin: Negative.    Neurological:  Negative for dizziness and headaches.   Psychiatric/Behavioral: Negative.               Current Outpatient Medications   Medication Sig Dispense Refill    benzonatate (TESSALON PERLES) 100 MG Oral Cap Take 1 capsule (100 mg total) by mouth 3 (three) times daily as needed for cough. 30 capsule 0    doxycycline 100 MG Oral Cap Take 1 capsule (100 mg total) by mouth 2 (two) times daily. 20 capsule 0    GABAPENTIN 300 MG Oral Cap TAKE 1 CAPSULE(300 MG) BY MOUTH THREE TIMES DAILY 90 capsule 2    sertraline 25 MG Oral Tab Take 1 tablet (25 mg total) by mouth every evening. 90 tablet 3    levocetirizine 5 MG Oral Tab Take 1 tablet (5 mg total) by mouth every evening. For  2-3  weeks than as  needed 30 tablet 0    atorvastatin 40 MG Oral Tab Take 1 tablet (40 mg total) by mouth nightly. 90 tablet 3    pantoprazole 40 MG Oral Tab EC TAKE 1 TABLET(40 MG) BY MOUTH DAILY 90 tablet 3    triamterene-hydroCHLOROthiazide 37.5-25 MG Oral Cap TAKE 1 CAPSULE BY MOUTH DAILY 90 capsule 3    allopurinol 100 MG Oral Tab Take 1 tablet (100 mg total) by mouth daily. 90 tablet 3    amLODIPine 5 MG Oral Tab Take 1 tablet (5 mg total) by mouth daily. 90 tablet 3    acetaminophen 500 MG Oral Tab Take 1 tablet (500 mg total) by mouth daily as needed.      apixaban 5 MG Oral Tab Take 1 tablet (5 mg total) by mouth 2 (two) times daily.         Allergies:  Allergies   Allergen Reactions    Codeine NAUSEA ONLY     Upset stomach          Current Outpatient Medications:     benzonatate (TESSALON PERLES) 100 MG Oral Cap, Take 1 capsule (100 mg total) by mouth 3 (three) times daily as needed for cough., Disp: 30 capsule, Rfl: 0    doxycycline 100 MG Oral Cap, Take 1 capsule (100 mg total) by mouth 2 (two) times daily., Disp: 20 capsule, Rfl: 0    GABAPENTIN 300 MG Oral Cap, TAKE 1 CAPSULE(300 MG) BY MOUTH THREE TIMES DAILY, Disp: 90 capsule, Rfl: 2    sertraline 25 MG Oral Tab, Take 1 tablet (25 mg total) by mouth every evening., Disp: 90 tablet, Rfl: 3    levocetirizine 5 MG Oral Tab, Take 1 tablet (5 mg total) by mouth every evening. For 2-3  weeks than as  needed, Disp: 30 tablet, Rfl: 0    atorvastatin 40 MG Oral Tab, Take 1 tablet (40 mg total) by mouth nightly., Disp: 90 tablet, Rfl: 3    pantoprazole 40 MG Oral Tab EC, TAKE 1 TABLET(40 MG) BY MOUTH DAILY, Disp: 90 tablet, Rfl: 3    triamterene-hydroCHLOROthiazide 37.5-25 MG Oral Cap, TAKE 1 CAPSULE BY MOUTH DAILY, Disp: 90 capsule, Rfl: 3    allopurinol 100 MG Oral Tab, Take 1 tablet (100 mg total) by mouth daily., Disp: 90 tablet, Rfl: 3    amLODIPine 5 MG Oral Tab, Take 1 tablet (5 mg total) by mouth daily., Disp: 90 tablet, Rfl: 3    acetaminophen 500 MG  Oral Tab, Take 1 tablet (500 mg total) by mouth daily as needed., Disp: , Rfl:     apixaban 5 MG Oral Tab, Take 1 tablet (5 mg total) by mouth 2 (two) times daily., Disp: , Rfl:     Past Medical History:    Allergic rhinitis    Anxiety    Arthritis    Chronic atrial fibrillation (HCC)    Current mild episode of major depressive disorder, unspecified whether recurrent (HCC)    Esophageal reflux    Essential hypertension    Hx of motion sickness    Hyperlipidemia    Major depression in partial remission (HCC)    Major depressive disorder    Osteoarthritis    Sleep apnea         PHYSICAL EXAM:     Vitals signs taken at home if available:    Limited examination for this telephone visit due to the coronavirus emergency    Patient was speaking in complete sentences, no increased work of breathing and very coherent and alert on the phone.  Alert and oriented x 3  Patient was responding to questions appropriately.  Patient did not appear short of breath.    ASSESSMENT/PLAN:     Encounter Diagnoses   Name Primary?    Acute cough Yes    Allergic rhinitis, unspecified seasonality, unspecified trigger        1. Acute cough  - benzonatate (TESSALON PERLES) 100 MG Oral Cap; Take 1 capsule (100 mg total) by mouth 3 (three) times daily as needed for cough.  Dispense: 30 capsule; Refill: 0  - doxycycline 100 MG Oral Cap; Take 1 capsule (100 mg total) by mouth 2 (two) times daily.  Dispense: 20 capsule; Refill: 0    2. Allergic rhinitis, unspecified seasonality, unspecified trigger  - unable to tolerated Flonase nasal spray  - add zyrtec daily       Patient reminded to practice good health and safety measures including washing hands, social distancing, covering mouth when coughing/ sneezing, avoid social meetings/ gatherings in face of this Covid 19 pandemic.    Patient verbalized understanding of plan and all questions answered to the best of my ability.    Patient to call back if any change/ worsening of symptoms.    No orders of  the defined types were placed in this encounter.      Meds This Visit:  Requested Prescriptions     Signed Prescriptions Disp Refills    benzonatate (TESSALON PERLES) 100 MG Oral Cap 30 capsule 0     Sig: Take 1 capsule (100 mg total) by mouth 3 (three) times daily as needed for cough.    doxycycline 100 MG Oral Cap 20 capsule 0     Sig: Take 1 capsule (100 mg total) by mouth 2 (two) times daily.       Imaging & Referrals:  None               Sarah Crawford, LOU  5/24/2024  2:08 PM      Please note that the following visit was completed using two-way, real-time interactive audio and/or video communication.  This has been done in good monica to provide continuity of care in the best interest of the provider-patient relationship, due to the ongoing public health crisis/national emergency and because of restrictions of visitation.  There are limitations of this visit as no physical exam could be performed.  Every conscious effort was taken to allow for sufficient and adequate time.  This billing was spent on reviewing labs, medications, radiology tests and decision making.  Appropriate medical decision-making and tests are ordered as detailed in the plan of care above  ID#[id number]

## 2024-06-19 NOTE — TELEPHONE ENCOUNTER
Refill Request    Medication request: GABAPENTIN 300 MG Oral Cap.  TAKE 1 CAPSULE(300 MG) BY MOUTH THREE TIMES DAILY      LOV:7/10/2023 Roseanne Wu, DO   Due back to clinic per last office note:  She will follow-up with me in 3 months   NOV: Visit date not found      [place]: 05/21/2024 #90 (30 days)    Urine drug screen (if applicable): N/A  Pain contract: N/A    LOV plan (if weaning or changing medications): no changes mentioned    LVMTCB-1st attempt. Also MyChart message sent.    (Patient needs an appointment)

## 2024-06-20 NOTE — TELEPHONE ENCOUNTER
Spoke with patient and offered to schedule an appointment. Patient stated that she is going to a pain relief instritute and that is why she didn't schedule an appointment with Dr Wu yet.     Informed patient that an appointment is needed to go over medications and also if patient wants the medications to be filled by Dr Wu.     Patient asked to call back today in the afternoon to schedule a follow up and to confirm if she wants to continue the refill with Dr Wu.

## 2024-06-21 RX ORDER — GABAPENTIN 300 MG/1
300 CAPSULE ORAL 3 TIMES DAILY
Qty: 90 CAPSULE | Refills: 0 | Status: SHIPPED | OUTPATIENT
Start: 2024-06-21

## 2024-06-21 NOTE — TELEPHONE ENCOUNTER
Called patient and she scheduled a follow up visit with Dr Wu on 07/09/2024. Refill for Gabapentin approved for 1 month until patient is seen in office.

## 2024-06-25 DIAGNOSIS — E78.2 MIXED HYPERLIPIDEMIA: ICD-10-CM

## 2024-06-28 DIAGNOSIS — I10 ESSENTIAL HYPERTENSION: ICD-10-CM

## 2024-06-28 DIAGNOSIS — M10.9 GOUT, UNSPECIFIED CAUSE, UNSPECIFIED CHRONICITY, UNSPECIFIED SITE: ICD-10-CM

## 2024-06-28 DIAGNOSIS — K21.9 GASTROESOPHAGEAL REFLUX DISEASE WITHOUT ESOPHAGITIS: ICD-10-CM

## 2024-06-28 RX ORDER — ATORVASTATIN CALCIUM 40 MG/1
40 TABLET, FILM COATED ORAL NIGHTLY
Qty: 90 TABLET | Refills: 3 | Status: SHIPPED | OUTPATIENT
Start: 2024-06-28

## 2024-06-28 NOTE — TELEPHONE ENCOUNTER
Refill passed per [facility] protocols.    Requested Prescriptions   Pending Prescriptions Disp Refills    ATORVASTATIN 40 MG Oral Tab [Pharmacy Med Name: ATORVASTATIN 40MG TABLETS] 90 tablet 3     Sig: TAKE 1 TABLET(40 MG) BY MOUTH EVERY NIGHT       Cholesterol Medication Protocol Passed - 6/25/2024  3:39 PM        Passed - ALT < 80     Lab Results   Component Value Date    ALT 15 02/23/2024             Passed - ALT resulted within past year        Passed - Lipid panel within past 12 months     Lab Results   Component Value Date    CHOLEST 146 02/23/2024    TRIG 51 02/23/2024    HDL 39 (L) 02/23/2024    LDL 96 02/23/2024    VLDL 8 02/23/2024    NONHDLC 107 02/23/2024             Passed - In person appointment or virtual visit in the past 12 mos or appointment in next 3 mos     Recent Outpatient Visits              1 month ago Acute cough    [facility] Sarah Crawford APRN    Telemedicine    4 months ago Encounter for annual health examination    [facility] Lacey Darnell MD    Office Visit    7 months ago Acute cough    [facility] Neena Bailey MD    Office Visit    11 months ago Primary osteoarthritis of right knee    [facility] Roseanne Wu DO    Office Visit    1 year ago Primary osteoarthritis of right knee    [facility] Roseanne Wu,     Office Visit          Future Appointments         Provider Department Appt Notes    In 1 week Roseanne Wu DO [facility] Follow up appointment.    In 3 weeks [address] [facility]    In 2 months Lacey Darnell MD [facility] weight management

## 2024-07-02 RX ORDER — PANTOPRAZOLE SODIUM 40 MG/1
40 TABLET, DELAYED RELEASE ORAL DAILY
Qty: 90 TABLET | Refills: 3 | Status: SHIPPED | OUTPATIENT
Start: 2024-07-02

## 2024-07-02 NOTE — TELEPHONE ENCOUNTER
Refill passed per [facility] protocol.  Requested Prescriptions   Pending Prescriptions Disp Refills    PANTOPRAZOLE 40 MG Oral Tab EC [Pharmacy Med Name: PANTOPRAZOLE 40MG TABLETS] 90 tablet 3     Sig: TAKE 1 TABLET(40 MG) BY MOUTH DAILY       Gastrointestional Medication Protocol Passed - 6/28/2024  5:44 AM        Passed - In person appointment or virtual visit in the past 12 mos or appointment in next 3 mos     Recent Outpatient Visits              1 month ago Acute cough    [facility] Sarah Crawford APRN    Telemedicine    4 months ago Encounter for annual health examination    [facility] Lacey Darnell MD    Office Visit    7 months ago Acute cough    [facility] Neena Bailey MD    Office Visit    11 months ago Primary osteoarthritis of right knee    [facility] Roseanne Wu,     Office Visit    1 year ago Primary osteoarthritis of right knee    [facility] Roseanne Wu,     Office Visit          Future Appointments         Provider Department Appt Notes    In 1 week Roseanne Wu,  [facility] Follow up appointment.    In 2 weeks [address] [facility]    In 1 month Lacey Darnell MD [facility] weight management                      ALLOPURINOL 100 MG Oral Tab [Pharmacy Med Name: ALLOPURINOL 100MG TABLETS] 90 tablet 3     Sig: TAKE 1 TABLET(100 MG) BY MOUTH DAILY       There is no refill protocol information for this order       AMLODIPINE 5 MG Oral Tab [Pharmacy Med Name: AMLODIPINE BESYLATE 5MG TABLETS] 90 tablet 3     Sig: TAKE 1 TABLET(5 MG) BY MOUTH DAILY       Hypertension Medications Protocol Failed - 6/28/2024  5:44 AM         Failed - EGFRCR or GFRAA > 50     GFR Evaluation  EGFRCR: 49 , resulted on 2/23/2024          Passed - CMP or BMP in past 12 months        Passed - Last BP reading less than 140/90     BP Readings from Last 1 Encounters:   02/14/24 130/69               Passed - In person appointment or virtual visit in the past 12 mos or appointment in next 3 mos     Recent Outpatient Visits              1 month ago Acute cough    [facility] Sarah Crawford APRN    Telemedicine    4 months ago Encounter for annual health examination    [facility] Lacey Darnell MD    Office Visit    7 months ago Acute cough    [facility] Neena Bailey MD    Office Visit    11 months ago Primary osteoarthritis of right knee    [facility] Roseanne Wu,     Office Visit    1 year ago Primary osteoarthritis of right knee    [facility] Roseanne Wu, DO    Office Visit          Future Appointments         Provider Department Appt Notes    In 1 week Roseanne Wu,  [facility] Follow up appointment.    In 2 weeks [address] [facility]g    In 1 month Lacey Darnell MD [facility] weight management                      TRIAMTERENE-HYDROCHLOROTHIAZIDE 37.5-25 MG Oral Cap [Pharmacy Med Name: TRIAMTERENE 37.5MG/ HCTZ 25MG CAPS] 90 capsule 3     Sig: TAKE 1 CAPSULE BY MOUTH DAILY       Hypertension Medications Protocol Failed - 6/28/2024  5:44 AM        Failed - EGFRCR or GFRAA > 50     GFR Evaluation  EGFRCR: 49 , resulted on 2/23/2024          Passed - CMP or BMP in past 12 months        Passed - Last BP reading less than 140/90     BP Readings from Last 1 Encounters:   02/14/24 130/69                Passed - In person appointment or virtual visit in the past 12 mos or appointment in next 3 mos     Recent Outpatient Visits              1 month ago Acute cough    [facility] Sarah Crawford APRN    Telemedicine    4 months ago En[facility] for annual health examination    [facility]    Office Visit    7 months ago Acute cough    [facility] Neena Bailey MD    Office Visit    11 months ago Primary osteoarthritis of right knee    [facility] Wu, Yogen Y, DO    Office Visit    1 year ago Primary osteoarthritis of right knee    [facility] Wu, Yogen Y, DO    Office Visit          Future Appointments         Provider Department Appt Notes    In 1 week Roseanne Wu Y, DO [facility] Follow up appointment.    In 2 weeks [address] [facility]g    In 1 month Lacey Darnell MD [facility] weight management                       Future Appointments         Provider Department Appt Notes    In 1 week Roseanne Wu Y, DO [facility] Follow up appointment.    In 2 weeks [address] for [facility] qa jg    In 1 month Lacey Darnell MD [facility] weight management          Recent Outpatient Visits              1 month ago [facility] cough    [facility]    [facility]    4 months ago Encounter for annual health examination    [facility]    Office Visit    7 months ago Acute cough    [facility]  [facility], [facility], Neena rAriaga MD    Office Visit    11 months ago Primary osteoarthritis of right knee    [facility], Roseanne Greer,     Office Visit    1 year ago Primary osteoarthritis of right knee    [facility], Roseanne Greer, DO    Office Visit

## 2024-07-02 NOTE — TELEPHONE ENCOUNTER
Please review; protocol failed/No Protocol    Last Office Visit: 02/14/2024    Requested Prescriptions   Pending Prescriptions Disp Refills    allopurinol 100 MG Oral Tab [Pharmacy Med Name: ALLOPURINOL 100MG TABLETS] 90 tablet 3     Sig: Take 1 tablet (100 mg total) by mouth daily.       There is no refill protocol information for this order       amLODIPine 5 MG Oral Tab [Pharmacy Med Name: AMLODIPINE BESYLATE 5MG TABLETS] 90 tablet 3     Sig: Take 1 tablet (5 mg total) by mouth daily.       Hypertension Medications Protocol Failed - 6/28/2024  5:44 AM        Failed - EGFRCR or GFRAA > 50     GFR Evaluation  EGFRCR: 49 , resulted on 2/23/2024          Passed - CMP or BMP in past 12 months        Passed - Last BP reading less than 140/90     BP Readings from Last 1 Encounters:   02/14/24 130/69               Passed - In person appointment or virtual visit in the past 12 mos or appointment in next 3 mos     Recent Outpatient Visits              1 month ago Acute cough    [facility] Sarah Crawford APRN    Telemedicine    4 months ago Encounter for annual health examination    [facility] Lacey Darnell MD    Office Visit    7 months ago Acute cough    [facility] Neena Bailey MD    Office Visit    11 months ago Primary osteoarthritis of right knee    [facility] Roseanne Wu DO    Office Visit    1 year ago Primary osteoarthritis of right knee    [facility] Roseanne Wu,     Office Visit          Future Appointments         Provider Department Appt Notes    In 1 week Roseanne Wu DO [facility] Follow up appointment.    In 2 weeks [address] [facility]g    In 1 month Lacey Darnell MD [place]  [facility] weight management                      triamterene-hydroCHLOROthiazide 37.5-25 MG Oral Cap [Pharmacy Med Name: TRIAMTERENE 37.5MG/ HCTZ 25MG CAPS] 90 capsule 3     Sig: TAKE 1 CAPSULE BY MOUTH DAILY       Hypertension Medications Protocol Failed - 6/28/2024  5:44 AM        Failed - EGFRCR or GFRAA > 50     GFR Evaluation  EGFRCR: 49 , resulted on 2/23/2024          Passed - CMP or BMP in past 12 months        Passed - Last BP reading less than 140/90     BP Readings from Last 1 Encounters:   02/14/24 130/69               Passed - In person appointment or virtual visit in the past 12 mos or appointment in next 3 mos     Recent Outpatient Visits              1 month ago Acute cough    [facility] Sarah Crawford APRN    Telemedicine    4 months ago Encounter for annual health examination    [facility] Lacey Darnell MD    Office Visit    7 months ago Acute cough    [facility] Neena Bailey MD    Office Visit    11 months ago Primary osteoarthritis of right knee    [facility] Roseanne Wu,     Office Visit    1 year ago Primary osteoarthritis of right knee    [facility] Roseanne Wu,     Office Visit          Future Appointments         Provider Department Appt Notes    In 1 week Roseanne Wu,  [facility] Follow up appointment.    In 2 weeks [address] [facility]    In 1 month Lacey Darnell MD [facility] weight management                     Signed Prescriptions Disp Refills    pantoprazole 40 MG Oral Tab EC 90 tablet 3     Sig: Take 1 tablet (40 mg total) by mouth daily.       Gastrointestional  Medication Protocol Passed - 6/28/2024  5:44 AM        Passed - In person appointment or virtual visit in the past 12 mos or appointment in next 3 mos     Recent Outpatient Visits              [facility]    4 months ago [facility] for annual health examination    [facility]    [facility]    7 months ago Acute cough    [facility] Neena Bailey MD    Office Visit    11 months ago Primary osteoarthritis of right knee    [facility] Bryce Yogen Y, DO    Office Visit    1 year ago Primary osteoarthritis of right knee    [facility] Wu Yogen Y, DO    Office Visit          Future Appointments         Provider Department Appt Notes    In 1 week Roseanne Wu, DO [facility] Follow up appointment.    In 2 weeks [address] for Health qa jg    In 1 month Lacey Darnell MD [facility] weight management                       Future Appointments         Provider Department Appt Notes    In 1 week Roseanne Wu, DO [facility] Follow up appointment.    In 2 weeks [address] for [facility] qa jg    In 1 month Lacey Darnell MD [facility] weight management          Recent Outpatient Visits              1 month ago [facility] cough    [facility]    [facility]    4 months ago Encounter for annual health examination    [facility]    [facility] Visit     7 months ago Acute cough    [facility], Neena Arriaga MD    Office Visit    11 months ago Primary osteoarthritis of right knee    [facility], Roseanne Greer, DO    Office Visit    1 year ago Primary osteoarthritis of right knee    [facility], Roseanne Greer, DO    Office Visit

## 2024-07-03 RX ORDER — TRIAMTERENE AND HYDROCHLOROTHIAZIDE 37.5; 25 MG/1; MG/1
1 CAPSULE ORAL DAILY
Qty: 90 CAPSULE | Refills: 3 | Status: SHIPPED | OUTPATIENT
Start: 2024-07-03

## 2024-07-03 RX ORDER — AMLODIPINE BESYLATE 5 MG/1
5 TABLET ORAL DAILY
Qty: 90 TABLET | Refills: 3 | Status: SHIPPED | OUTPATIENT
Start: 2024-07-03

## 2024-07-03 RX ORDER — ALLOPURINOL 100 MG/1
100 TABLET ORAL DAILY
Qty: 90 TABLET | Refills: 3 | Status: SHIPPED | OUTPATIENT
Start: 2024-07-03

## 2024-07-16 RX ORDER — GABAPENTIN 300 MG/1
300 CAPSULE ORAL 3 TIMES DAILY
Qty: 90 CAPSULE | Refills: 0 | Status: SHIPPED | OUTPATIENT
Start: 2024-07-16

## 2024-07-16 NOTE — TELEPHONE ENCOUNTER
Refill Request    Medication request: gabapentin 300 MG Oral Cap. TAKE 1 CAPSULE(300 MG) BY MOUTH THREE TIMES DAILY.     LOV:7/10/2023 Roseanne Wu, DO   Due back to clinic per last office note: Per Dr. Wu: \"Follow up in 3 months\"  NOV: 7/23/2024 Roseanne Wu, [place]      [place]: 06/21/2024 #90    Urine drug screen (if applicable): n/a  Pain contract: n/a    LOV plan (if weaning or changing medications): Gabapentin not mentioned in LOV note.

## 2024-07-22 ENCOUNTER — HOSPITAL ENCOUNTER (OUTPATIENT)
Dept: MAMMOGRAPHY | Facility: HOSPITAL | Age: 79
Discharge: HOME | End: 2024-07-22
Attending: INTERNAL MEDICINE
Payer: MEDICARE

## 2024-07-22 DIAGNOSIS — Z12.31 SCREENING MAMMOGRAM, ENCOUNTER FOR: ICD-10-CM

## 2024-07-22 PROCEDURE — 77067 SCR MAMMO BI INCL CAD: CPT | Performed by: INTERNAL MEDICINE

## 2024-07-22 PROCEDURE — 77063 BREAST TOMOSYNTHESIS BI: CPT | Performed by: INTERNAL MEDICINE

## 2024-09-30 ENCOUNTER — MED REC SCAN ONLY (OUTPATIENT)
Dept: INTERNAL MEDICINE CLINIC | Facility: CLINIC | Age: 79
End: 2024-09-30

## 2024-10-15 ENCOUNTER — TELEPHONE (OUTPATIENT)
Dept: INTERNAL MEDICINE CLINIC | Facility: CLINIC | Age: 79
End: 2024-10-15

## 2024-10-15 RX ORDER — GABAPENTIN 300 MG/1
300 CAPSULE ORAL 3 TIMES DAILY
Qty: 90 CAPSULE | Refills: 0 | OUTPATIENT
Start: 2024-10-15

## 2024-10-15 NOTE — TELEPHONE ENCOUNTER
Refill Request    Medication request: GABAPENTIN 300 MG Oral Cap TAKE 1 CAPSULE(300 MG) BY MOUTH THREE TIMES DAILY     LOV: 7/10/23  Due back to clinic per last office note:  \"She will follow-up with me in 3 months \"  NOV: Visit date not found      [place]: 9/19/24 #90 - 30 day supply    Urine drug screen (if applicable): n/a  Pain contract: n/a    LOV plan (if weaning or changing medications): Gabapentin not mentioned in LOV note.       Patient had an appointment scheduled on 7/9/24 and 7/23/24, but these appointments were both cancelled and patient has not scheduled another follow up appointment.    Message sent to patient via [employer] that follow up appointment needs to be made prior to processing refill request.

## 2024-10-15 NOTE — TELEPHONE ENCOUNTER
Patient wanted to ask  if she would take over and prescribe Rx Gabapentin 300MG for 90 tablets. Patient said she is no longer seeing Dr.Yogen Wu. Please advise           Current Outpatient Medications   Medication Sig Dispense Refill    GABAPENTIN 300 MG Oral Cap TAKE 1 CAPSULE(300 MG) BY MOUTH THREE TIMES DAILY 90 capsule 0

## 2024-10-16 NOTE — TELEPHONE ENCOUNTER
Noted patient has seen the [employer] message that was sent. Will wait for reply. No further action is needed.  Last read by Angela Matamoros at  5:50 PM on 10/15/2024.

## 2024-10-17 RX ORDER — GABAPENTIN 300 MG/1
300 CAPSULE ORAL 3 TIMES DAILY
Qty: 90 CAPSULE | Refills: 1 | Status: SHIPPED | OUTPATIENT
Start: 2024-10-17

## 2024-10-17 NOTE — TELEPHONE ENCOUNTER
Dr Darnell, please advise    Please see medication question encounter on 10/15. Patient is asking for refill for gabapentin.    Order pended for approval/review.   Pharmacy and allergies verified.    Note-unable to pend refill in other encounter. Refill request was routed separately.

## 2024-10-17 NOTE — TELEPHONE ENCOUNTER
Note-unable to pend refill in this encounter-refill routed separately.    Patient (name and  verified) calling back to confirm pharmacy.

## 2024-11-01 ENCOUNTER — NURSE TRIAGE (OUTPATIENT)
Dept: INTERNAL MEDICINE CLINIC | Facility: CLINIC | Age: 79
End: 2024-11-01

## 2024-11-01 ENCOUNTER — OFFICE VISIT (OUTPATIENT)
Dept: INTERNAL MEDICINE CLINIC | Facility: CLINIC | Age: 79
End: 2024-11-01
Payer: COMMERCIAL

## 2024-11-01 VITALS
SYSTOLIC BLOOD PRESSURE: 112 MMHG | HEIGHT: 66 IN | BODY MASS INDEX: 46.61 KG/M2 | WEIGHT: 290 LBS | OXYGEN SATURATION: 97 % | DIASTOLIC BLOOD PRESSURE: 71 MMHG | HEART RATE: 68 BPM

## 2024-11-01 DIAGNOSIS — R06.83 SNORING: Primary | ICD-10-CM

## 2024-11-01 DIAGNOSIS — R09.82 POST-NASAL DRIP: ICD-10-CM

## 2024-11-01 DIAGNOSIS — R53.83 OTHER FATIGUE: ICD-10-CM

## 2024-11-01 PROCEDURE — 1126F AMNT PAIN NOTED NONE PRSNT: CPT | Performed by: INTERNAL MEDICINE

## 2024-11-01 PROCEDURE — 99213 OFFICE O/P EST LOW 20 MIN: CPT | Performed by: INTERNAL MEDICINE

## 2024-11-01 PROCEDURE — 3074F SYST BP LT 130 MM HG: CPT | Performed by: INTERNAL MEDICINE

## 2024-11-01 PROCEDURE — 3078F DIAST BP <80 MM HG: CPT | Performed by: INTERNAL MEDICINE

## 2024-11-01 PROCEDURE — 3008F BODY MASS INDEX DOCD: CPT | Performed by: INTERNAL MEDICINE

## 2024-11-01 PROCEDURE — 1159F MED LIST DOCD IN RCRD: CPT | Performed by: INTERNAL MEDICINE

## 2024-11-01 PROCEDURE — 1160F RVW MEDS BY RX/DR IN RCRD: CPT | Performed by: INTERNAL MEDICINE

## 2024-11-01 RX ORDER — FLUTICASONE PROPIONATE 50 MCG
2 SPRAY, SUSPENSION (ML) NASAL DAILY
Qty: 16 G | Refills: 11 | Status: SHIPPED | OUTPATIENT
Start: 2024-11-01 | End: 2025-10-27

## 2024-11-01 NOTE — TELEPHONE ENCOUNTER
Call was transfer by [facility] as patient was calling to cancel her 3:30 appointment for today as her ride will not be able to make it on time.   Patient stated that she has been having  headaches 4/10  in the mornings for the past couple of months. Per patient if she uses Vicks sticks and it goes away. Patient also having trouble sleeping and want to discuss Ozempic. Patient is able to make a appointment at 4 pm. Patient was given a appt for 4 pm.       Future Appointments   Date Time Provider Department Center   11/1/2024  4:00 PM Loree Christensen MD [facility]       Reason for Disposition   Unexplained headache that is present > 24 hours    Protocols used: Headache-A-[place]

## 2024-11-01 NOTE — PATIENT INSTRUCTIONS
Please start the following medications:   Flonase 1 spray per nostril daily as needed for post nasal drip    Consider following up with pulmonology for sleep apnea evaluation.     Come back for labs to check the thyroid, vitamin D, blood counts, kidney function, and liver function.     Please practice sleep hygiene:  - go to bed at the same time every day   - turn off all screens 2 hours before bed   - take melatonin 1 hour before bed   - if you cannot sleep within 1 hour of going to bed, you should get up and relax for a few minutes, before going back to bed   - keep temperature in room on the cooler side   - keep room dark

## 2024-11-01 NOTE — PROGRESS NOTES
Angela Matamoros is a 79 year old female with complaints of:  Chief Complaint: Fatigue (Feeling sluggish and fatigued. ), Cough (Coughing at nightime.), Weight Loss (Discuss medication for weightloss), and Incontinence (Unable to hold it when feeling the need to go to the bathroom)    HPI     Angela Matamoros is a(n) 79 year old female with a history of davida on AC, aortic stenosis, HTN, HLD, who presents for fatigue.    She is complaining of insomnia. She spends many hours on her screens, up to 4 am. She often sleeps a lot during the day and feels fatigued during days. She often wakes up with her a headache. She has been told that she snores when she sleeps.     Patient has had a cough for 1 year. Worse at night. Not productive. She occasionally wheezes when she coughs a lot or when she has a URI. She takes medication for acid reflux. She does have a post         Past Medical History     Past Medical History:    Allergic rhinitis    Anxiety    Arthritis    Chronic atrial fibrillation (HCC)    Current mild episode of major depressive disorder, unspecified whether recurrent (HCC)    Esophageal reflux    Essential hypertension    Hx of motion sickness    Hyperlipidemia    Major depression in partial remission (HCC)    Major depressive disorder    Osteoarthritis    Sleep apnea        Past Surgical History     Past Surgical History:   Procedure Laterality Date    Appendectomy      Back surgery      Discectomy    D & c            Other surgical history      Spine surgery procedure unlisted      Cervical fusion        Family History     Family History   Problem Relation Age of Onset    Hypertension Father     Kidney Disease Father     Macular degeneration Mother     Diabetes Neg     Glaucoma Neg        Social History     Social History     Socioeconomic History    Marital status: Single   Tobacco Use    Smoking status: Never    Smokeless tobacco: Never    Tobacco comments:     None   Vaping Use     Vaping status: Never Used   Substance and Sexual Activity    Alcohol use: Never    Drug use: Never    Sexual activity: Not Currently   Other Topics Concern    Caffeine Concern No    Exercise No   Social History Narrative    The patient does use an assistive device.. single point cane     The patient does live in a home with stairs.       Allergies     Allergies[1]    Current Medications     Current Outpatient Medications   Medication Sig Dispense Refill    gabapentin 300 MG Oral Cap Take 1 capsule (300 mg total) by mouth 3 (three) times daily. 90 capsule 1    allopurinol 100 MG Oral Tab Take 1 tablet (100 mg total) by mouth daily. 90 tablet 3    amLODIPine 5 MG Oral Tab Take 1 tablet (5 mg total) by mouth daily. 90 tablet 3    triamterene-hydroCHLOROthiazide 37.5-25 MG Oral Cap Take 1 capsule by mouth daily. 90 capsule 3    pantoprazole 40 MG Oral Tab EC Take 1 tablet (40 mg total) by mouth daily. 90 tablet 3    atorvastatin 40 MG Oral Tab Take 1 tablet (40 mg total) by mouth nightly. 90 tablet 3    benzonatate (TESSALON PERLES) 100 MG Oral Cap Take 1 capsule (100 mg total) by mouth 3 (three) times daily as needed for cough. 30 capsule 0    doxycycline 100 MG Oral Cap Take 1 capsule (100 mg total) by mouth 2 (two) times daily. 20 capsule 0    sertraline 25 MG Oral Tab Take 1 tablet (25 mg total) by mouth every evening. 90 tablet 3    levocetirizine 5 MG Oral Tab Take 1 tablet (5 mg total) by mouth every evening. For 2-3  weeks than as  needed 30 tablet 0    acetaminophen 500 MG Oral Tab Take 1 tablet (500 mg total) by mouth daily as needed.      apixaban 5 MG Oral Tab Take 1 tablet (5 mg total) by mouth 2 (two) times daily.       No current facility-administered medications for this visit.       Review of Systems     GENERAL HEALTH: feels well otherwise  SKIN: denies any unusual skin lesions or rashes  RESPIRATORY: denies shortness of breath with exertion  CARDIOVASCULAR: denies chest pain on exertion  GI: denies  abdominal pain and denies heartburn  : denies any burning with urination, urinary frequency or urgency  NEURO: denies headaches, numbness or tingling, mental status changes  PSYCH: denies depressed mood, anxiety  MUSC: denies muscle aches, joint pain    Physical Exam     /71 (BP Location: Left arm, Patient Position: Sitting, Cuff Size: large)   Pulse 68   Ht 5' 6\" (1.676 m)   Wt 290 lb (131.5 kg)   SpO2 97%   BMI 46.81 kg/m²     GENERAL: well developed, well nourished,in no apparent distress  SKIN: no rashes,no suspicious lesions  HEENT: atraumatic, normocephalic,ears and throat are clear  NECK: supple,no adenopathy,no bruits  LUNGS: clear to auscultation  CARDIO: RRR without murmur  GI: good BS's,no masses, HSM or tenderness  EXTREMITIES: no cyanosis, clubbing or edema    Assessment and Plan     Diagnoses and all orders for this visit:    Snoring. Concern for sleep apnea. Patient is reluctant to undergo this evaluation, but did discuss that untreated DIRK would cause exacerbations of all the patient's other metabolic and cardiac issues. She will think about it. Pulm referral placed in case patient changes her mind.   -     Cancel: Pulmonary Referral - In Network  -     Pulmonary Referral - In Network    Other fatigue. DIRK eval suggested as above. Patient also has some degree of insomnia. Sleep hygiene discussed. Check following labs for organic cause.   -     Cancel: Pulmonary Referral - In Network  -     TSH W Reflex To Free T4 [E]; Future  -     CBC W Differential W Platelet [E]; Future  -     Comp Metabolic Panel (14) [E]; Future  -     Vitamin D [E]; Future  -     Ferritin [E]; Future  -     Pulmonary Referral - In Network    Post-nasal drip. Start nasal spray.   -     fluticasone propionate 50 MCG/ACT Nasal Suspension; 2 sprays by Each Nare route daily.    Insomnia --> does not want prescription for now. Will take melatonin OTC. Discussed sleep hygiene -- go to bed at the same time every day, turn  off screen 2 hours before bed, etc. If no change with behavioral modification, can consider trazodone.     Obesity -- would hold off on starting a GLP1 in an elderly patient without diabetes for now      fluticasone propionate 50 MCG/ACT Nasal Suspension 2 sprays by Each Nare route daily. 16 g 11    gabapentin 300 MG Oral Cap Take 1 capsule (300 mg total) by mouth 3 (three) times daily. 90 capsule 1    allopurinol 100 MG Oral Tab Take 1 tablet (100 mg total) by mouth daily. 90 tablet 3    amLODIPine 5 MG Oral Tab Take 1 tablet (5 mg total) by mouth daily. 90 tablet 3    triamterene-hydroCHLOROthiazide 37.5-25 MG Oral Cap Take 1 capsule by mouth daily. 90 capsule 3    pantoprazole 40 MG Oral Tab EC Take 1 tablet (40 mg total) by mouth daily. 90 tablet 3    atorvastatin 40 MG Oral Tab Take 1 tablet (40 mg total) by mouth nightly. 90 tablet 3    benzonatate (TESSALON PERLES) 100 MG Oral Cap Take 1 capsule (100 mg total) by mouth 3 (three) times daily as needed for cough. 30 capsule 0    sertraline 25 MG Oral Tab Take 1 tablet (25 mg total) by mouth every evening. 90 tablet 3    acetaminophen 500 MG Oral Tab Take 1 tablet (500 mg total) by mouth daily as needed.      apixaban 5 MG Oral Tab Take 1 tablet (5 mg total) by mouth 2 (two) times daily.         Requested Prescriptions     Signed Prescriptions Disp Refills    fluticasone propionate 50 MCG/ACT Nasal Suspension 16 g 11     Si sprays by Each Nare route daily.       Orders Placed This Encounter   Procedures    TSH W Reflex To Free T4 [E]     Standing Status:   Future     Standing Expiration Date:   2025     Order Specific Question:   Release to patient     Answer:   Immediate    CBC W Differential W Platelet [E]     Standing Status:   Future     Standing Expiration Date:   2025     Order Specific Question:   Release to patient     Answer:   Immediate    Comp Metabolic Panel (14) [E]     Standing Status:   Future     Standing Expiration Date:    11/1/2025     Order Specific Question:   Release to patient     Answer:   Immediate    Vitamin D [E]     Standing Status:   Future     Standing Expiration Date:   11/1/2025     Order Specific Question:   Please pick the scenario that best fits the purpose for ordering this test     Answer:   General Screening/Vit D deficiency (25-Hydroxy)     Order Specific Question:   Release to patient     Answer:   Immediate    Ferritin [E]     Standing Status:   Future     Standing Expiration Date:   11/1/2025     Order Specific Question:   Release to patient     Answer:   Immediate       No follow-ups on file.    The patient indicates understanding of these issues and agrees to the plan.    Electronically signed by Loree Christensen MD 11/01/24             [1]   Allergies  Allergen Reactions    Codeine NAUSEA ONLY     Upset stomach

## 2024-12-17 RX ORDER — GABAPENTIN 300 MG/1
300 CAPSULE ORAL 3 TIMES DAILY
Qty: 90 CAPSULE | Refills: 1 | Status: SHIPPED | OUTPATIENT
Start: 2024-12-17

## 2024-12-17 NOTE — TELEPHONE ENCOUNTER
On 10/15/2024 patient asked Dr. Lacey Darnell to take over filling for patient gabapentin.  Dr. Lacey Darnell approved to fill patient gabapentin sent over refill for 90 capsules with 1 refill. Directions  Take 1 capsule (300 mg total) by mouth 3 (three) times daily.     Patient is requesting refill, is refill appropriate. Medication pended for review/ approval     Requested Prescriptions   Pending Prescriptions Disp Refills    GABAPENTIN 300 MG Oral Cap [Pharmacy Med Name: GABAPENTIN 300MG CAPSULES] 90 capsule 1     Sig: TAKE 1 CAPSULE(300 MG) BY MOUTH THREE TIMES DAILY       Neurology Medications Passed - 12/17/2024  2:35 PM        Passed - In person appointment or virtual visit in the past 6 mos or appointment in next 3 mos     Recent Outpatient Visits              1 month ago oring    [facility], Loree Flower MD    Office Visit    6 months ago Acute cough    [facility]    [place]medicine    10 months ago Encounter for annual health examination    [facility], Lacey Knowles MD    Office Visit    [facility]    1 year ago Primary osteoarthritis of right knee    [facility] [place]    Office Visit                               Recent Outpatient Visits              1 month ago Snoring    [facility]    Office Visit    6 months ago Acute cough    [facility] [place]    Telemedicine    10 months ago Encounter for annual health examination    [facility], Lacey Knowles MD    Office Visit    1 year ago Acute cough    [facility]  [facility], Neena Arriaga MD    Office Visit    1 year ago Primary osteoarthritis of right knee    [facility] [place],     Office Visit

## 2025-01-17 ENCOUNTER — NURSE TRIAGE (OUTPATIENT)
Dept: INTERNAL MEDICINE CLINIC | Facility: CLINIC | Age: 80
End: 2025-01-17

## 2025-01-17 NOTE — TELEPHONE ENCOUNTER
Action Requested: Summary for Provider     []  Critical Lab, Recommendations Needed  [] Need Additional Advice  [x]   FYI    []   Need Orders  [] Need Medications Sent to Pharmacy  []  Other     SUMMARY: Per protocol, patient should be seen in the office today or tomorrow. No appointment available. Advised to go to [facility] for evaluation and treatment.    Reason for call: Cough  Onset: One Week Ago    Patient reports of nasal congestion, cough and sore throat. States cough and nasal congestion has been ongoing for one week now, sore throat started last night. Denies shortness of breath or difficulty of breathing. No fever. She has been taking Mucinex Sinus Max. Care advice given per protocol. Patient verbalized understanding and agreed with plan of care.     Reason for Disposition   Patient wants to be seen    Protocols used: Cough-A-[place]

## 2025-02-02 DIAGNOSIS — F32.4 MAJOR DEPRESSIVE DISORDER WITH SINGLE EPISODE, IN PARTIAL REMISSION: Chronic | ICD-10-CM

## 2025-02-04 RX ORDER — SERTRALINE HYDROCHLORIDE 25 MG/1
25 TABLET, FILM COATED ORAL EVERY EVENING
Qty: 90 TABLET | Refills: 3 | Status: SHIPPED | OUTPATIENT
Start: 2025-02-04

## 2025-02-04 NOTE — TELEPHONE ENCOUNTER
Refill passed per [facility] protocols.    Requested Prescriptions   Pending Prescriptions Disp Refills    SERTRALINE 25 MG Oral Tab [Pharmacy Med Name: SERTRALINE 25MG TABLETS] 90 tablet 3     Sig: TAKE 1 TABLET(25 MG) BY MOUTH EVERY EVENING       Psychiatric Non-Scheduled (Anti-Anxiety) Passed - 2/4/2025  4:02 PM        Passed - In person appointment or virtual visit in the past 6 mos or appointment in next 3 mos     Recent Outpatient Visits              3 months ago Snoring    [facility] Loree Christensen MD    Office Visit    8 months ago Acute cough    [facility] Sarah Crawford APRN    Telemedicine    11 months ago Encounter for annual health examination    [facility] Lacey Darnell MD    Office Visit    1 year ago Acute cough    [facility] Neena Bailey MD    Office Visit    1 year ago Primary osteoarthritis of right knee    [facility] Roseanne Wu DO    Office Visit          Future Appointments         Provider Department Appt Notes    In 1 month Lacey Darnell MD [facility] None                    Passed - Depression Screening completed within the past 12 months        Passed - Medication is active on med list

## 2025-03-14 ENCOUNTER — NURSE TRIAGE (OUTPATIENT)
Dept: INTERNAL MEDICINE CLINIC | Facility: CLINIC | Age: 80
End: 2025-03-14

## 2025-03-14 NOTE — TELEPHONE ENCOUNTER
Please reply to pool: EM RN TRIAGE  Action Requested: Summary for Provider     []  Critical Lab, Recommendations Needed  [] Need Additional Advice  [x]   FYI    []   Need Orders  [] Need Medications Sent to Pharmacy  []  Other     SUMMARY: Patient contacts clinic reporting cough x 1 week.  Productive yellow mucous, difficulty expectorating secretions.  Denies fever or breathing difficulty.  Using over the counter without relief.  Nurse advised appointment, patient will go to walk in clinic or immediate care tomorrow, unable to come in today.      Reason for call: Cough  Onset: Data Unavailable                       Reason for Disposition   Patient wants to be seen   Continuous (nonstop) coughing interferes with work or school and no improvement using cough treatment per Care Advice    Protocols used: Cough-A-OH

## 2025-03-28 RX ORDER — GABAPENTIN 300 MG/1
300 CAPSULE ORAL 3 TIMES DAILY
Qty: 90 CAPSULE | Refills: 1 | Status: SHIPPED | OUTPATIENT
Start: 2025-03-28

## 2025-03-28 NOTE — TELEPHONE ENCOUNTER
Refill Per Protocol     Requested Prescriptions   Pending Prescriptions Disp Refills    GABAPENTIN 300 MG Oral Cap [Pharmacy Med Name: GABAPENTIN 300MG CAPSULES] 90 capsule 1     Sig: TAKE 1 CAPSULE(300 MG) BY MOUTH THREE TIMES DAILY       Neurology Medications Passed - 3/28/2025 11:13 AM        Passed - In person appointment or virtual visit in the past 6 mos or appointment in next 3 mos     Recent Outpatient Visits              4 months ago [facility]    Office Visit    10 months ago [facility]    [facility]    1 year ago Encounter for annual health examination    [facility]    Office Visit    1 year ago Acute cough    [facility] Neena Bailey MD    Office Visit    1 year ago Primary osteoarthritis of right knee    [facility] Roseanne Wu, DO    Office Visit          Future Appointments         Provider Department Appt Notes    In 5 days Lacey Darnell MD [facility] 2/14/24 None                    Passed - Medication is active on med list

## 2025-04-02 ENCOUNTER — OFFICE VISIT (OUTPATIENT)
Dept: INTERNAL MEDICINE CLINIC | Facility: CLINIC | Age: 80
End: 2025-04-02
Payer: COMMERCIAL

## 2025-04-02 ENCOUNTER — LAB ENCOUNTER (OUTPATIENT)
Dept: LAB | Age: 80
End: 2025-04-02
Attending: INTERNAL MEDICINE
Payer: MEDICARE

## 2025-04-02 VITALS
HEIGHT: 66 IN | RESPIRATION RATE: 18 BRPM | TEMPERATURE: 98 F | SYSTOLIC BLOOD PRESSURE: 124 MMHG | OXYGEN SATURATION: 97 % | BODY MASS INDEX: 47.09 KG/M2 | WEIGHT: 293 LBS | HEART RATE: 61 BPM | DIASTOLIC BLOOD PRESSURE: 68 MMHG

## 2025-04-02 DIAGNOSIS — M10.9 GOUT, UNSPECIFIED CAUSE, UNSPECIFIED CHRONICITY, UNSPECIFIED SITE: ICD-10-CM

## 2025-04-02 DIAGNOSIS — R73.03 BORDERLINE DIABETES: ICD-10-CM

## 2025-04-02 DIAGNOSIS — E66.01 OBESITY, MORBID, BMI 40.0-49.9 (HCC): ICD-10-CM

## 2025-04-02 DIAGNOSIS — R53.83 OTHER FATIGUE: ICD-10-CM

## 2025-04-02 DIAGNOSIS — N18.31 STAGE 3A CHRONIC KIDNEY DISEASE (HCC): ICD-10-CM

## 2025-04-02 DIAGNOSIS — Z00.00 ENCOUNTER FOR ANNUAL HEALTH EXAMINATION: Primary | ICD-10-CM

## 2025-04-02 DIAGNOSIS — I48.19 PERSISTENT ATRIAL FIBRILLATION (HCC): ICD-10-CM

## 2025-04-02 DIAGNOSIS — M17.0 PRIMARY OSTEOARTHRITIS OF KNEES, BILATERAL: ICD-10-CM

## 2025-04-02 DIAGNOSIS — K21.9 GASTROESOPHAGEAL REFLUX DISEASE WITHOUT ESOPHAGITIS: ICD-10-CM

## 2025-04-02 DIAGNOSIS — I10 ESSENTIAL HYPERTENSION: ICD-10-CM

## 2025-04-02 DIAGNOSIS — E78.2 MIXED HYPERLIPIDEMIA: ICD-10-CM

## 2025-04-02 DIAGNOSIS — M46.96 INFLAMMATORY SPONDYLOPATHY OF LUMBAR REGION: ICD-10-CM

## 2025-04-02 DIAGNOSIS — R05.3 CHRONIC COUGH: ICD-10-CM

## 2025-04-02 DIAGNOSIS — F32.4 MAJOR DEPRESSIVE DISORDER WITH SINGLE EPISODE, IN PARTIAL REMISSION: Chronic | ICD-10-CM

## 2025-04-02 DIAGNOSIS — F41.9 ANXIETY: ICD-10-CM

## 2025-04-02 DIAGNOSIS — E79.0 ELEVATED BLOOD URIC ACID LEVEL: ICD-10-CM

## 2025-04-02 DIAGNOSIS — M48.062 SPINAL STENOSIS OF LUMBAR REGION WITH NEUROGENIC CLAUDICATION: ICD-10-CM

## 2025-04-02 PROBLEM — D69.2 SENILE PURPURA: Status: RESOLVED | Noted: 2022-01-24 | Resolved: 2025-04-02

## 2025-04-02 PROBLEM — I70.0 ATHEROSCLEROSIS OF AORTA: Status: RESOLVED | Noted: 2020-06-18 | Resolved: 2025-04-02

## 2025-04-02 PROBLEM — G91.2 (IDIOPATHIC) NORMAL PRESSURE HYDROCEPHALUS (HCC): Status: RESOLVED | Noted: 2021-03-17 | Resolved: 2025-04-02

## 2025-04-02 PROBLEM — F13.20 SEDATIVE, HYPNOTIC OR ANXIOLYTIC DEPENDENCE, UNCOMPLICATED (HCC): Status: RESOLVED | Noted: 2023-03-08 | Resolved: 2025-04-02

## 2025-04-02 LAB
ALBUMIN SERPL-MCNC: 4.4 G/DL (ref 3.2–4.8)
ALBUMIN/GLOB SERPL: 1.2 {RATIO} (ref 1–2)
ALP LIVER SERPL-CCNC: 140 U/L
ALT SERPL-CCNC: 16 U/L
ANION GAP SERPL CALC-SCNC: 6 MMOL/L (ref 0–18)
AST SERPL-CCNC: 19 U/L (ref ?–34)
BASOPHILS # BLD AUTO: 0.07 X10(3) UL (ref 0–0.2)
BASOPHILS NFR BLD AUTO: 0.8 %
BILIRUB SERPL-MCNC: 0.6 MG/DL (ref 0.2–1.1)
BUN BLD-MCNC: 15 MG/DL (ref 9–23)
BUN/CREAT SERPL: 12.6 (ref 10–20)
CALCIUM BLD-MCNC: 9.4 MG/DL (ref 8.7–10.4)
CHLORIDE SERPL-SCNC: 103 MMOL/L (ref 98–112)
CHOLEST SERPL-MCNC: 204 MG/DL (ref ?–200)
CO2 SERPL-SCNC: 31 MMOL/L (ref 21–32)
CREAT BLD-MCNC: 1.19 MG/DL
DEPRECATED HBV CORE AB SER IA-ACNC: 120 NG/ML
DEPRECATED RDW RBC AUTO: 43.7 FL (ref 35.1–46.3)
EGFRCR SERPLBLD CKD-EPI 2021: 47 ML/MIN/1.73M2 (ref 60–?)
EOSINOPHIL # BLD AUTO: 0.12 X10(3) UL (ref 0–0.7)
EOSINOPHIL NFR BLD AUTO: 1.3 %
ERYTHROCYTE [DISTWIDTH] IN BLOOD BY AUTOMATED COUNT: 13.9 % (ref 11–15)
EST. AVERAGE GLUCOSE BLD GHB EST-MCNC: 140 MG/DL (ref 68–126)
FASTING PATIENT LIPID ANSWER: YES
FASTING STATUS PATIENT QL REPORTED: YES
GLOBULIN PLAS-MCNC: 3.7 G/DL (ref 2–3.5)
GLUCOSE BLD-MCNC: 110 MG/DL (ref 70–99)
HBA1C MFR BLD: 6.5 % (ref ?–5.7)
HCT VFR BLD AUTO: 40.2 %
HDLC SERPL-MCNC: 44 MG/DL (ref 40–59)
HGB BLD-MCNC: 12.8 G/DL
IMM GRANULOCYTES # BLD AUTO: 0.02 X10(3) UL (ref 0–1)
IMM GRANULOCYTES NFR BLD: 0.2 %
LDLC SERPL CALC-MCNC: 150 MG/DL (ref ?–100)
LYMPHOCYTES # BLD AUTO: 2.8 X10(3) UL (ref 1–4)
LYMPHOCYTES NFR BLD AUTO: 30.9 %
MCH RBC QN AUTO: 27.4 PG (ref 26–34)
MCHC RBC AUTO-ENTMCNC: 31.8 G/DL (ref 31–37)
MCV RBC AUTO: 85.9 FL
MONOCYTES # BLD AUTO: 0.71 X10(3) UL (ref 0.1–1)
MONOCYTES NFR BLD AUTO: 7.8 %
NEUTROPHILS # BLD AUTO: 5.35 X10 (3) UL (ref 1.5–7.7)
NEUTROPHILS # BLD AUTO: 5.35 X10(3) UL (ref 1.5–7.7)
NEUTROPHILS NFR BLD AUTO: 59 %
NONHDLC SERPL-MCNC: 160 MG/DL (ref ?–130)
OSMOLALITY SERPL CALC.SUM OF ELEC: 291 MOSM/KG (ref 275–295)
PLATELET # BLD AUTO: 284 10(3)UL (ref 150–450)
POTASSIUM SERPL-SCNC: 4.4 MMOL/L (ref 3.5–5.1)
PROT SERPL-MCNC: 8.1 G/DL (ref 5.7–8.2)
RBC # BLD AUTO: 4.68 X10(6)UL
SODIUM SERPL-SCNC: 140 MMOL/L (ref 136–145)
TRIGL SERPL-MCNC: 53 MG/DL (ref 30–149)
TSI SER-ACNC: 3.67 UIU/ML (ref 0.55–4.78)
VIT D+METAB SERPL-MCNC: 27 NG/ML (ref 30–100)
VLDLC SERPL CALC-MCNC: 10 MG/DL (ref 0–30)
WBC # BLD AUTO: 9.1 X10(3) UL (ref 4–11)

## 2025-04-02 PROCEDURE — 83036 HEMOGLOBIN GLYCOSYLATED A1C: CPT

## 2025-04-02 PROCEDURE — G0439 PPPS, SUBSEQ VISIT: HCPCS | Performed by: INTERNAL MEDICINE

## 2025-04-02 PROCEDURE — 80053 COMPREHEN METABOLIC PANEL: CPT

## 2025-04-02 PROCEDURE — 1159F MED LIST DOCD IN RCRD: CPT | Performed by: INTERNAL MEDICINE

## 2025-04-02 PROCEDURE — 3008F BODY MASS INDEX DOCD: CPT | Performed by: INTERNAL MEDICINE

## 2025-04-02 PROCEDURE — 82728 ASSAY OF FERRITIN: CPT

## 2025-04-02 PROCEDURE — 96160 PT-FOCUSED HLTH RISK ASSMT: CPT | Performed by: INTERNAL MEDICINE

## 2025-04-02 PROCEDURE — 36415 COLL VENOUS BLD VENIPUNCTURE: CPT

## 2025-04-02 PROCEDURE — 84443 ASSAY THYROID STIM HORMONE: CPT

## 2025-04-02 PROCEDURE — 1160F RVW MEDS BY RX/DR IN RCRD: CPT | Performed by: INTERNAL MEDICINE

## 2025-04-02 PROCEDURE — 80061 LIPID PANEL: CPT

## 2025-04-02 PROCEDURE — 82306 VITAMIN D 25 HYDROXY: CPT

## 2025-04-02 PROCEDURE — 3074F SYST BP LT 130 MM HG: CPT | Performed by: INTERNAL MEDICINE

## 2025-04-02 PROCEDURE — 3078F DIAST BP <80 MM HG: CPT | Performed by: INTERNAL MEDICINE

## 2025-04-02 PROCEDURE — 1126F AMNT PAIN NOTED NONE PRSNT: CPT | Performed by: INTERNAL MEDICINE

## 2025-04-02 PROCEDURE — 99499 UNLISTED E&M SERVICE: CPT | Performed by: INTERNAL MEDICINE

## 2025-04-02 PROCEDURE — 85025 COMPLETE CBC W/AUTO DIFF WBC: CPT

## 2025-04-02 PROCEDURE — 1170F FXNL STATUS ASSESSED: CPT | Performed by: INTERNAL MEDICINE

## 2025-04-02 RX ORDER — BENZONATATE 100 MG/1
100 CAPSULE ORAL 3 TIMES DAILY PRN
Qty: 30 CAPSULE | Refills: 0 | Status: SHIPPED | OUTPATIENT
Start: 2025-04-02

## 2025-04-02 NOTE — PROGRESS NOTES
Subjective:   Angela Matamoros is a 80 year old female who presents for a MA AHA (Medicare Advantage Annual Health Assessment) and Subsequent Annual Wellness visit (Pt already had Initial Annual Wellness) and scheduled follow up of multiple significant but stable problems.     Patient comes for her Medicare physical  Overall doing well but noted that she has gained weight but admits to eating a little bit more than usual especially over the winter  Received her right knee injection March 2025 and will go for the left knee tomorrow and also got a nerve block which helped both knees and back     HISTORY  Complains of back pain and  knee pain and will see [facility] tomorrow and have some gel injections into the knee  Granddaughter is her caretaker               HISTORY  12/2021 VISIT   physiatry Dr. Wu and got 2 injections in her knee and in the summer get injections to her back which helped things that made it might be coming back now  Knee still bothering her and difficult to go down to do laundry so she is wondering about homemaker services to help her with the laundry                 [facility]   Anxiety and depression   Hypertension  Hyperlipidemia  Carpal tonsillar  Osteoarthritis of bilateral knees-sees Dr. Zapata  GERD  History of ?  Gout--patient states that she was told she had elevated uric acid level and that is why she was started on the allopurinol she has never had a gouty attack and has never had a kidney stone--  Elevated uric acid level--  A FIB DIAG IN Sept 2021   History of COVID-19 August 21, 2021  Prediabetes      Lives alone    -------------------------------------------------------------------------------------------------------------------------------       History/Other:   Fall Risk Assessment:   She has been screened for Falls and is High Risk. Fall Prevention information provided to patient in After Visit Summary.    Do you feel unsteady when standing or walking?:  (Patient-Rptd) Yes  Do you worry about falling?: (Patient-Rptd) No  Have you fallen in the past year?: (Patient-Rptd) No     Cognitive Assessment:   She had a completely normal cognitive assessment - see flowsheet entries     Functional Ability/Status:   Angela Matamoros has some abnormal functions as listed below:  She has Driving difficulties based on screening of functional status. She has Meal Preparation difficulties based on screening of functional status.She has difficulties Shopping for Groceries based on screening of functional status. She has Vision problems based on screening of functional status. She has Walking problems based on screening of functional status.       Depression Screening (PHQ):  PHQ-2 SCORE: 0  , done 4/2/2025   If you checked off any problems, how difficult have these problems made it for you to do your work, take care of things at home, or get along with other people?: Not difficult at all              Advanced Directives:   She does NOT have a Living Will. [Do you have a living will?: (Patient-Rptd) No]  She does NOT have a Power of  for Health Care. [Do you have a healthcare power of ?: (Patient-Rptd) No]  Discussed Advance Care Planning with patient (and family/surrogate if present). Standard forms made available to patient in After Visit Summary.      Patient Active Problem List   Diagnosis    Mixed hyperlipidemia    Essential hypertension    Gout    Elevated blood uric acid level    Anxiety    CKD (chronic kidney disease) stage 3, GFR 30-59 ml/min ([facility])    Gastroesophageal reflux disease without esophagitis    Borderline diabetes    Obesity, morbid, BMI 40.0-49.9 ([facility])    Major depression in partial remission    Spinal stenosis of lumbar region with neurogenic claudication    Atrial fibrillation ([facility])    Primary osteoarthritis of knees, bilateral    Inflammatory spondylopathy of lumbar region     Allergies:  She is allergic to codeine.    Current  Medications:  Outpatient Medications Marked as Taking for the 25 encounter (Office Visit) with Lacey Darnell MD   Medication Sig    benzonatate (TESSALON PERLES) 100 MG Oral Cap Take 1 capsule (100 mg total) by mouth 3 (three) times daily as needed for cough.    gabapentin 300 MG Oral Cap Take 1 capsule (300 mg total) by mouth 3 (three) times daily.    sertraline 25 MG Oral Tab Take 1 tablet (25 mg total) by mouth every evening.    allopurinol 100 MG Oral Tab Take 1 tablet (100 mg total) by mouth daily.    amLODIPine 5 MG Oral Tab Take 1 tablet (5 mg total) by mouth daily.    triamterene-hydroCHLOROthiazide 37.5-25 MG Oral Cap Take 1 capsule by mouth daily.    pantoprazole 40 MG Oral Tab EC Take 1 tablet (40 mg total) by mouth daily.    atorvastatin 40 MG Oral Tab Take 1 tablet (40 mg total) by mouth nightly.    acetaminophen 500 MG Oral Tab Take 1 tablet (500 mg total) by mouth daily as needed.    apixaban 5 MG Oral Tab Take 1 tablet (5 mg total) by mouth 2 (two) times daily.       Medical History:  She  has a past medical history of Allergic rhinitis, Anxiety, Arthritis (), Chronic atrial fibrillation (), Current mild episode of major depressive disorder, unspecified whether recurrent, Esophageal reflux, Essential hypertension, motion sickness, Hyperlipidemia, Major depression in partial remission, Major depressive disorder, Osteoarthritis (), and Sleep apnea ().  Surgical History:  She  has a past surgical history that includes appendectomy; back surgery (); other surgical history; d & c (); ; and spine surgery procedure unlisted ().   Family History:  Her family history includes Hypertension in her father; Kidney Disease in her father; Macular degeneration in her mother.  Social History:  She  reports that she has never smoked. She has never used smokeless tobacco. She reports that she does not drink alcohol and does not use drugs.    Tobacco:  She has never smoked  tobacco.    CAGE Alcohol Screen:   CAGE screening score of 0 on 3/30/2025, showing low risk of alcohol abuse.      Patient Care Team:  Lacey Darnell MD as PCP - General (Internal Medicine)    Review of Systems  GENERAL: feels well otherwise  SKIN: denies any unusual skin lesions  EYES: denies blurred vision or double vision  HEENT: denies nasal congestion, sinus pain or ST  LUNGS: denies shortness of breath with exertion  CARDIOVASCULAR: denies chest pain on exertion  GI: denies abdominal pain, denies heartburn  : denies dysuria, vaginal discharge or itching, no complaint of urinary incontinence   MUSCULOSKELETAL: +  back pain  NEURO: denies headaches  PSYCHE: denies depression or anxiety  HEMATOLOGIC: denies hx of anemia  ENDOCRINE: denies thyroid history  ALL/ASTHMA: denies hx of allergy or asthma    Objective:   Physical Exam  GENERAL: well developed, well nourished,in no apparent distress, walks with cane   SKIN: no rashes,no suspicious lesions  HEENT: atraumatic, normocephalic,ears and throat are clear, no frontal or maxillary sinus tenderness, pupils equal reactive to light bilaterally, extraocular muscles intact  NECK: supple,no adenopathy, nontender   LUNGS: clear to auscultation, no wheeze  CARDIO: RRR +murmur  GI: good BS's,no masses or tenderness  EXTREMITIES: no cyanosis, or edema  BREAST: Bilateral breasts without any lumps, bilateral axilla without any lymphadenopathy, no nipple retraction or  discharge        /68   Pulse 61   Temp 97.6 °F (36.4 °C) (Temporal)   Resp 18   Ht 5' 6\" (1.676 m)   Wt 294 lb (133.4 kg)   SpO2 97%   BMI 47.45 kg/m²  Estimated body mass index is 47.45 kg/m² as calculated from the following:    Height as of this encounter: 5' 6\" (1.676 m).    Weight as of this encounter: 294 lb (133.4 kg).    Medicare Hearing Assessment:   Hearing Screening    Screening Method: Finger Rub  Finger Rub Result: Pass         Visual Acuity:   Right Eye Visual Acuity: Uncorrected  Right Eye Chart Acuity: 20/20   Left Eye Visual Acuity: Uncorrected Left Eye Chart Acuity: 20/20   Both Eyes Visual Acuity: Uncorrected Both Eyes Chart Acuity: 20/20   Able To Tolerate Visual Acuity: Yes        Assessment & Plan:   Angela Matamoros is a 80 year old female who presents for a Medicare Assessment.     1. Encounter for annual health examination (Primary)  Advised patient to watch what she eats and exercise, seatbelt use no texting driving, sunscreen use advised   2. Mixed hyperlipidemia  -     Hemoglobin A1C; Future; Expected date: 04/02/2025  -     Lipid Panel; Future; Expected date: 04/02/2025  Follow low-fat low-cholesterol diet and exercise with a goal of 30 minutes a day as tolerated, focus more on diet as exercise is difficult for continue medications   3. Borderline diabetes  -     Hemoglobin A1C; Future; Expected date: 04/02/2025  -     Lipid Panel; Future; Expected date: 04/02/2025  Follow a low carb low sugar due and exercise as tolerated with a goal of 30 minutes a day   4. Obesity, morbid, BMI 40.0-49.9 (HCC)  Overview:  BMI 41  Orders:  -     Referral to Bariatrics  Advised patient to follow healthy diet and exercise as tolerated   5. Chronic cough  -     Benzonatate; Take 1 capsule (100 mg total) by mouth 3 (three) times daily as needed for cough.  Dispense: 30 capsule; Refill: 0  6. Persistent atrial fibrillation (HCC)  On eliquis and f/u cardiology   7. Stage 3a chronic kidney disease (HCC)  Stay well-hydrated, avoid NSAIDs, monitor   8. Major depressive disorder with single episode, in partial remission  Stable on medications-continue   9. Essential hypertension  Advised pt to follow a low salt , low sodium (including fast foods and processed foods), can look up DASH diet, exercise 30 min a day , monitor bp   Cont meds   10. Anxiety  Stable on medications-continue   11. Gastroesophageal reflux disease without esophagitis  Stable, monitor, avoid the foods that cause symptoms, continue  medications   12. Primary osteoarthritis of knees, bilateral  Stable, monitor   13. Gout, unspecified cause, unspecified chronicity, unspecified site  Stable, monitor   14. Elevated blood uric acid level  Stable, monitor   15. Spinal stenosis of lumbar region with neurogenic claudication  Stable, monitor   16. Inflammatory spondylopathy of lumbar region  Stable, monitor       Plan/additionally   Pt declined sleep study        Preventative medicine  Labs 2/2024  reviewed   DEXA scan in 2016 in care everywhere was normal, reordered but declined   cscope -will refer, FIT card test 1/2022  Mammogram 7/2024 ,ordered          The patient indicates understanding of these issues and agrees to the plan.  Reinforced healthy diet, lifestyle, and exercise.      Return in 6 months (on 10/2/2025).     Lacey Darnell MD, 4/2/2025     Supplementary Documentation:   General Health:  In the past six months, have you lost more than 10 pounds without trying?: (Patient-Rptd) 2 - No  Has your appetite been poor?: (Patient-Rptd) No  Type of Diet: (Patient-Rptd) Low Salt, Other  How does the patient maintain a good energy level?: (Patient-Rptd) Other  How would you describe your daily physical activity?: (Patient-Rptd) Light  How would you describe your current health state?: (Patient-Rptd) Fair  How do you maintain positive mental well-being?: (Patient-Rptd) Social Interaction, Games  On a scale of 0 to 10, with 0 being no pain and 10 being severe pain, what is your pain level?: (Patient-Rptd) 5 - (Moderate)  In the past six months, have you experienced urine leakage?: (Patient-Rptd) 1-Yes  At any time do you feel concerned for the safety/well-being of yourself and/or your children, in your home or elsewhere?: No  Have you had any immunizations at another office such as Influenza, Hepatitis B, Tetanus, or Pneumococcal?: (Patient-Rptd) No    Health Maintenance   Topic Date Due    Zoster Vaccines (1 of 2) Never done    DEXA Scan  Never done     Annual Well Visit  01/01/2025    Annual Depression Screening  01/01/2025    HTN: BP Follow-Up  05/02/2025    COVID-19 Vaccine (6 - 2024-25 season) 05/21/2025    Influenza Vaccine (Season Ended) 10/01/2025    Fall Risk Screening (Annual)  Completed    Pneumococcal Vaccine: 50+ Years  Completed    Meningococcal B Vaccine  Aged Out    Mammogram  Discontinued

## 2025-04-11 ENCOUNTER — TELEPHONE (OUTPATIENT)
Dept: INTERNAL MEDICINE CLINIC | Facility: CLINIC | Age: 80
End: 2025-04-11

## 2025-04-11 NOTE — TELEPHONE ENCOUNTER
Patient is a newly diagnosed diabetic.  Is requesting an appointment.   Scheduled an appointment with Denise Ashley.

## 2025-04-12 PROBLEM — R05.3 CHRONIC COUGH: Status: ACTIVE | Noted: 2025-04-12

## 2025-04-16 ENCOUNTER — OFFICE VISIT (OUTPATIENT)
Dept: INTERNAL MEDICINE CLINIC | Facility: CLINIC | Age: 80
End: 2025-04-16
Payer: COMMERCIAL

## 2025-04-16 VITALS
DIASTOLIC BLOOD PRESSURE: 71 MMHG | SYSTOLIC BLOOD PRESSURE: 137 MMHG | WEIGHT: 293 LBS | HEART RATE: 81 BPM | BODY MASS INDEX: 47.09 KG/M2 | OXYGEN SATURATION: 96 % | HEIGHT: 66 IN

## 2025-04-16 DIAGNOSIS — Z59.10 INADEQUATE HOUSING, UNSPECIFIED: ICD-10-CM

## 2025-04-16 DIAGNOSIS — E11.9 TYPE 2 DIABETES MELLITUS WITHOUT COMPLICATION, WITHOUT LONG-TERM CURRENT USE OF INSULIN (HCC): Primary | ICD-10-CM

## 2025-04-16 DIAGNOSIS — E78.2 MIXED HYPERLIPIDEMIA: ICD-10-CM

## 2025-04-16 PROCEDURE — 3075F SYST BP GE 130 - 139MM HG: CPT

## 2025-04-16 PROCEDURE — 3008F BODY MASS INDEX DOCD: CPT

## 2025-04-16 PROCEDURE — 1160F RVW MEDS BY RX/DR IN RCRD: CPT

## 2025-04-16 PROCEDURE — 1125F AMNT PAIN NOTED PAIN PRSNT: CPT

## 2025-04-16 PROCEDURE — 99213 OFFICE O/P EST LOW 20 MIN: CPT

## 2025-04-16 PROCEDURE — 1159F MED LIST DOCD IN RCRD: CPT

## 2025-04-16 PROCEDURE — 3078F DIAST BP <80 MM HG: CPT

## 2025-04-16 SDOH — ECONOMIC STABILITY - HOUSING INSECURITY: INADEQUATE HOUSING UNSPECIFIED: Z59.10

## 2025-04-16 NOTE — PROGRESS NOTES
Subjective:   Angela Matamoros is a 80 year old female who presents for Follow - Up (Discuss lab results)     Presents to f/u on lab results  LDL increased from previous, now 150  Now in type 2 diabetes range with A1c 6.5    Notes that she gained some weight recently and has not been strict with her diet, has been eating pizza and cookies and drinking soda   Eats fruit with cereal in the morning   Walks with a cane, bilateral knee arthritis limits mobility   Discussed dietary changes since exercise is difficult     History/Other:    Chief Complaint Reviewed and Verified  Nursing Notes Reviewed and   Verified  Tobacco Reviewed  Allergies Reviewed  Medications Reviewed    Problem List Reviewed  Medical History Reviewed  Surgical History   Reviewed  OB Status Reviewed  Family History Reviewed         Tobacco:  She has never smoked tobacco.    Current Medications[1]      Review of Systems:  Review of Systems  10 point review of systems otherwise negative with the exception of HPI and assessment and plan    Objective:   /71   Pulse 81   Ht 5' 6\" (1.676 m)   Wt 294 lb (133.4 kg)   SpO2 96%   BMI 47.45 kg/m²  Estimated body mass index is 47.45 kg/m² as calculated from the following:    Height as of this encounter: 5' 6\" (1.676 m).    Weight as of this encounter: 294 lb (133.4 kg).  Physical Exam  Vitals reviewed.   Constitutional:       General: She is not in acute distress.     Appearance: Normal appearance. She is well-developed.   Cardiovascular:      Rate and Rhythm: Normal rate and regular rhythm.      Heart sounds: Murmur heard.   Pulmonary:      Effort: Pulmonary effort is normal.      Breath sounds: Normal breath sounds.   Skin:     General: Skin is warm and dry.   Neurological:      Mental Status: She is alert and oriented to person, place, and time.       Assessment & Plan:   1. Type 2 diabetes mellitus without complication, without long-term current use of insulin (HCC) (Primary)  2. Mixed  hyperlipidemia  Will work on reduced sugar, simple carbs, and saturated fats in the diet   Exercise as tolerated - walking, does not like to swim     Denise LOU Ashley, 4/16/2025, 3:41 PM        [1]   Current Outpatient Medications   Medication Sig Dispense Refill    benzonatate (TESSALON PERLES) 100 MG Oral Cap Take 1 capsule (100 mg total) by mouth 3 (three) times daily as needed for cough. 30 capsule 0    gabapentin 300 MG Oral Cap Take 1 capsule (300 mg total) by mouth 3 (three) times daily. 90 capsule 1    sertraline 25 MG Oral Tab Take 1 tablet (25 mg total) by mouth every evening. 90 tablet 3    allopurinol 100 MG Oral Tab Take 1 tablet (100 mg total) by mouth daily. 90 tablet 3    amLODIPine 5 MG Oral Tab Take 1 tablet (5 mg total) by mouth daily. 90 tablet 3    triamterene-hydroCHLOROthiazide 37.5-25 MG Oral Cap Take 1 capsule by mouth daily. 90 capsule 3    pantoprazole 40 MG Oral Tab EC Take 1 tablet (40 mg total) by mouth daily. 90 tablet 3    atorvastatin 40 MG Oral Tab Take 1 tablet (40 mg total) by mouth nightly. 90 tablet 3    acetaminophen 500 MG Oral Tab Take 1 tablet (500 mg total) by mouth daily as needed.      apixaban 5 MG Oral Tab Take 1 tablet (5 mg total) by mouth 2 (two) times daily.

## 2025-05-27 RX ORDER — GABAPENTIN 300 MG/1
300 CAPSULE ORAL 3 TIMES DAILY
Qty: 270 CAPSULE | Refills: 1 | Status: SHIPPED | OUTPATIENT
Start: 2025-05-27

## 2025-05-27 NOTE — TELEPHONE ENCOUNTER
Refill passes per [facility] protocol.    Future Appointments   Date Time Provider Department Center   10/2/2025  1:40 PM Lacey Darnell MD [facility]

## 2025-06-21 DIAGNOSIS — I10 ESSENTIAL HYPERTENSION: ICD-10-CM

## 2025-06-21 DIAGNOSIS — K21.9 GASTROESOPHAGEAL REFLUX DISEASE WITHOUT ESOPHAGITIS: ICD-10-CM

## 2025-06-21 DIAGNOSIS — M10.9 GOUT, UNSPECIFIED CAUSE, UNSPECIFIED CHRONICITY, UNSPECIFIED SITE: ICD-10-CM

## 2025-06-24 RX ORDER — ALLOPURINOL 100 MG/1
100 TABLET ORAL DAILY
Qty: 90 TABLET | Refills: 3 | Status: SHIPPED | OUTPATIENT
Start: 2025-06-24

## 2025-06-24 RX ORDER — PANTOPRAZOLE SODIUM 40 MG/1
40 TABLET, DELAYED RELEASE ORAL DAILY
Qty: 90 TABLET | Refills: 3 | Status: SHIPPED | OUTPATIENT
Start: 2025-06-24

## 2025-06-24 RX ORDER — AMLODIPINE BESYLATE 5 MG/1
5 TABLET ORAL DAILY
Qty: 90 TABLET | Refills: 3 | Status: SHIPPED | OUTPATIENT
Start: 2025-06-24

## 2025-06-24 RX ORDER — TRIAMTERENE AND HYDROCHLOROTHIAZIDE 37.5; 25 MG/1; MG/1
1 CAPSULE ORAL DAILY
Qty: 90 CAPSULE | Refills: 3 | Status: SHIPPED | OUTPATIENT
Start: 2025-06-24

## 2025-06-26 DIAGNOSIS — E78.2 MIXED HYPERLIPIDEMIA: ICD-10-CM

## 2025-06-28 RX ORDER — ATORVASTATIN CALCIUM 40 MG/1
40 TABLET, FILM COATED ORAL NIGHTLY
Qty: 90 TABLET | Refills: 3 | Status: SHIPPED | OUTPATIENT
Start: 2025-06-28

## 2025-06-28 NOTE — TELEPHONE ENCOUNTER
Refill passed per [facility] protocol.    Requested Prescriptions   Pending Prescriptions Disp Refills    ATORVASTATIN 40 MG Oral Tab [Pharmacy Med Name: ATORVASTATIN 40MG TABLETS] 90 tablet 3     Sig: TAKE 1 TABLET(40 MG) BY MOUTH EVERY NIGHT       Cholesterol Medication Protocol Passed - 6/28/2025  9:16 AM        Passed - ALT < 80     Lab Results   Component Value Date    ALT 16 04/02/2025             Passed - ALT resulted within past year        Passed - Lipid panel within past 12 months     Lab Results   Component Value Date    CHOLEST 204 (H) 04/02/2025    TRIG 53 04/02/2025    HDL 44 04/02/2025     (H) 04/02/2025    VLDL 10 04/02/2025    NONHDLC 160 (H) 04/02/2025             Passed - In person appointment or virtual visit in the past 12 mos or appointment in next 3 mos     Recent Outpatient Visits              2 months ago Type 2 diabetes mellitus without complication, without long-term current use of insulin (HCC)    [facility] Denise Ashley APRN    Office Visit    2 months ago Encounter for annual health examination    [facility] Lacey Darnell MD    Office Visit    7 months ago Snoring    [facility] Loree Christensen MD    Office Visit    1 year ago Acute cough    [facility] Sarah Crawford APRN    Telemedicine    1 year ago Encounter for annual health examination    [facility] Lacey Danrell MD    Office Visit          Future Appointments         Provider Department Appt Notes    In 3 months Lacey Darnell MD [facility] 6 month f/u                    Passed - Medication is active on med list           Future Appointments         Provider Department Appt Notes    In 3 months [facility]          Recent Outpatient Visits              2 months ago Type 2 diabetes mellitus without complication, without long-term current use of insulin (HCC)    [facility] [place] [facility]    Office Visit    2 months ago Encounter for annual health examination    [facility] [place] [facility]    Office Visit    7 months ago Snoring    [facility] [place] [place] [facility]    Office Visit    1 year ago Acute cough    [facility]    Telemedicine    1 year ago Encounter for annual health examination    [facility] [place] [place]    Office Visit

## 2025-08-15 ENCOUNTER — NURSE TRIAGE (OUTPATIENT)
Dept: INTERNAL MEDICINE CLINIC | Facility: CLINIC | Age: 80
End: 2025-08-15

## (undated) DEVICE — TOWEL SURG OR 17X30IN BLUE

## (undated) DEVICE — NEEDLE BLUNT BD 18G X 1-1/2

## (undated) DEVICE — APPLICATOR CHLORAPREP 26ML

## (undated) DEVICE — 3M™ STERI-STRIP™ REINFORCED ADHESIVE SKIN CLOSURES, R1547, 1/2 IN X 4 IN (12 MM X 100 MM), 6 STRIPS/ENVELOPE: Brand: 3M™ STERI-STRIP™

## (undated) DEVICE — STANDARD HYPODERMIC NEEDLE,POLYPROPYLENE HUB: Brand: MONOJECT

## (undated) DEVICE — GOWN SURG AERO BLUE PERF XLG

## (undated) DEVICE — PEN: MARKING STD PT 100/CS: Brand: MEDICAL ACTION INDUSTRIES

## (undated) DEVICE — GAMMEX® PI HYBRID SIZE 7, STERILE POWDER-FREE SURGICAL GLOVE, POLYISOPRENE AND NEOPRENE BLEND: Brand: GAMMEX

## (undated) DEVICE — GAUZE TRAY STERILE 4X4 12PLY

## (undated) DEVICE — DRAPE SRG 26X15IN UTL TPE STRL

## (undated) DEVICE — COVER STND 54X23IN MAYO REINF

## (undated) DEVICE — 12 ML SYRINGE LUER-LOCK TIP: Brand: MONOJECT

## (undated) DEVICE — PAD GROUNDING ELECTROSURGICAL

## (undated) DEVICE — DRAPE SHEET LARGE 76X55

## (undated) DEVICE — Device

## (undated) NOTE — LETTER
AUTHORIZATION FOR SURGICAL OPERATION OR OTHER PROCEDURE    1.  I hereby authorize Dr. Yamilex Souza  and the [facility] staff assigned to my case to perform the following operation and/or procedure at the [facility]:    Bilateral knee aspiration and injection with of Physician  My signature below affirms that prior to the time of the procedure, I have explained to the patient and/or his/her guardian, the risks and benefits involved in the proposed treatment and any reasonable alternative to the proposed treatment.

## (undated) NOTE — LETTER
RUBINA Notifier: [employer]   LUKAS Patient Name: Leopoldo Masters. Identification Number: [id number]      Advance Beneficiary Notice of Noncoverage (ABN)  NOTE:  If Medicare doesn’t pay for D. Item/service(s) below, you may have to pay.   Medicare I am not responsible for payment, and I cannot appeal to see if Medicare would pay. H. Additional Information: This notice gives our opinion, not an official Medicare decision.  If you have other questions on this notice or Medicare billing, call [phone]

## (undated) NOTE — LETTER
AUTHORIZATION FOR SURGICAL OPERATION OR OTHER PROCEDURE    1. I hereby authorize Dr. Shalini Alegria and the [facility] staff assigned to my case to perform the following operation and/or procedure at the [facility]:    Ultrasound guided RIGHT KNEE aspiration and injection with corticosteroid  _______________________________________________________________________________________________      _______________________________________________________________________________________________    2. My physician has explained the nature and purpose of the operation or other procedure, possible alternative methods of treatment, the risks involved, and the possibility of complication to me. I acknowledge that no guarantee has been made as to the result that may be obtained. 3.  I recognize that, during the course of this operation, or other procedure, unforseen conditions may necessitate additional or different procedure than those listed above. I, therefore, further authorize and request that the above named physician, his/her physician assistants or designees perform such procedures as are, in his/her professional opinion, necessary and desirable. 4.  Any tissue or organs removed in the operation or other procedure may be disposed of by and at the discretion of the [facility] staff and [facility]. 5.  I understand that in the event of a medical emergency, I will be transported by local paramedics to [facility] or other hospital emergency department. 6.  I certify that I have read and fully understand the above consent to operation and/or other procedure. 7.  I acknowledge that my physician has explained sedation/analgesia administration to me including the risks and benefits. I consent to the administration of sedation/analgesia as may be necessary or desirable in the judgement of my physician.     Witness signature: ___________________________________________________ Date: ______/______/_____                    Time:  ________ A. M.  P.M. Patient Name:  ___Angela Matamoros___________________________________________________  (please print)       Patient signature:  ___________________________________________________             Relationship to Patient:           []  Parent    Responsible person                          []  Spouse  In case of minor or                    [] Other  _____________   Incompetent name:  __________________________________________________                               (please print)      _____________      Responsible person  In case of minor or  Incompetent signature:  _______________________________________________    Statement of Physician  My signature below affirms that prior to the time of the procedure, I have explained to the patient and/or his/her guardian, the risks and benefits involved in the proposed treatment and any reasonable alternative to the proposed treatment. I have also explained the risks and benefits involved in the refusal of the proposed treatment and have answered the patient's questions.                         Date:  ______/______/_______  Provider                      Signature:  __________________________________________________________       Time:  ___________ A.M    P.M.

## (undated) NOTE — LETTER
AUTHORIZATION FOR SURGICAL OPERATION OR OTHER PROCEDURE    1. I hereby authorize Dr. Baron Coats and the [facility] staff assigned to my case to perform the following operation and/or procedure at the [facility]:    Ultrasound guided right knee aspiration and injection with corticosteroid     2. My physician has explained the nature and purpose of the operation or other procedure, possible alternative methods of treatment, the risks involved, and the possibility of complication to me. I acknowledge that no guarantee has been made as to the result that may be obtained. 3.  I recognize that, during the course of this operation, or other procedure, unforseen conditions may necessitate additional or different procedure than those listed above. I, therefore, further authorize and request that the above named physician, his/her physician assistants or designees perform such procedures as are, in his/her professional opinion, necessary and desirable. 4.  Any tissue or organs removed in the operation or other procedure may be disposed of by and at the discretion of the [facility] staff and [facility]. 5.  I understand that in the event of a medical emergency, I will be transported by local paramedics to [facility] or other [facility] emergency department. 6.  I certify that I have read and fully understand the above consent to operation and/or other procedure. 7.  I acknowledge that my physician has explained sedation/analgesia administration to me including the risks and benefits. I consent to the administration of sedation/analgesia as may be necessary or desirable in the judgement of my physician. Witness signature: ___________________________________________________ Date:  ______/______/_____                    Time:  ________ A. M.  P.M.        Patient Name:  Katja Ferrara  2/20/1945  [id number]     Patient signature: ___________________________________________________             Relationship to Patient:           []  Parent    Responsible person                          []  Spouse  In case of minor or                    [] Other  _____________   Incompetent name:  __________________________________________________                               (please print)      _____________      Responsible person  In case of minor or  Incompetent signature:  _______________________________________________    Statement of Physician  My signature below affirms that prior to the time of the procedure, I have explained to the patient and/or his/her guardian, the risks and benefits involved in the proposed treatment and any reasonable alternative to the proposed treatment. I have also explained the risks and benefits involved in the refusal of the proposed treatment and have answered the patient's questions.                         Date:  ______/______/_______  Provider                      Signature:  __________________________________________________________       Time:  ___________ A.M    P.M.

## (undated) NOTE — LETTER
AUTHORIZATION FOR SURGICAL OPERATION OR OTHER PROCEDURE    1.  I hereby authorize Dr. Tiffanie Stubbs and the [facility] staff assigned to my case to perform the following operation and/or procedure at the [facility]:    Right knee aspiration and injection with Hyalu Time:  ________ A. M.  P.M.        Patient Name:  _Angela Matamoros_____________________________________________________  (please print)       Patient signature:  ___________________________________________________             Relationship to [facility]

## (undated) NOTE — ED AVS SNAPSHOT
Lorna Olsen   MRN: [id number]    Department:  [facility] Emergency Department   Date of Visit:  12/27/2019           Disclosure     Insurance plans vary and the physician(s) referred by the ER may not be covered by your plan.  Please cont within the next three months to obtain basic health screening including reassessment of your blood pressure.     IF THERE IS ANY CHANGE OR WORSENING OF YOUR CONDITION, CALL YOUR PRIMARY CARE PHYSICIAN AT ONCE OR RETURN IMMEDIATELY TO THE EMERGENCY DEPARTMEN

## (undated) NOTE — ED AVS SNAPSHOT
Kerri Garcia   MRN: [id number]    Department:  [facility] Emergency Department   Date of Visit:  7/13/2019           Disclosure     Insurance plans vary and the physician(s) referred by the ER may not be covered by your plan.  Please conta within the next three months to obtain basic health screening including reassessment of your blood pressure.     IF THERE IS ANY CHANGE OR WORSENING OF YOUR CONDITION, CALL YOUR PRIMARY CARE PHYSICIAN AT ONCE OR RETURN IMMEDIATELY TO THE EMERGENCY DEPARTMEN

## (undated) NOTE — LETTER
22        To Whom It May Concern:      John Going  :  1945    []  Patient has been cleared to hold hold Eliquis to hold for 2 or 3 days prior to Caudal epidural steroid injection for procedure. Holding the medication(s) may put the patient at an increased risk for stroke, heart attack, or neurologic or cardiac events. []  Patient has not been cleared to hold Eliquis for procedure. If this office may be of further assistance, please do not hesitate to contact us. Sincerely,    Liliana Aleman.  Getachew Speaker: [address] [place] [address]  F: [phone]

## (undated) NOTE — ED AVS SNAPSHOT
Young Howard   MRN: [id number]    Department:  [facility] Emergency Department   Date of Visit:  1/4/2020           Disclosure     Insurance plans vary and the physician(s) referred by the ER may not be covered by your plan.  Please contac within the next three months to obtain basic health screening including reassessment of your blood pressure.     IF THERE IS ANY CHANGE OR WORSENING OF YOUR CONDITION, CALL YOUR PRIMARY CARE PHYSICIAN AT ONCE OR RETURN IMMEDIATELY TO THE EMERGENCY DEPARTMEN

## (undated) NOTE — LETTER
RUBINA Notifier: Joshua/[employer]   JOHN. Patient Name: Zayra Collier. Identification Number: [id number]      Advance Beneficiary Notice of Noncoverage (ABN)  NOTE:  If Medicare doesn’t pay for D. Item/service(s) below, you may have to pay.   Medicare less co-pays or deductibles. ? OPTION 2. I want the D. Item/service(s) listed above, but do not bill Medicare. You may ask to be paid now as I am responsible for payment. I cannot appeal if Medicare is not billed. ? OPTION 3. I don’t want the D.  Item/s